# Patient Record
Sex: FEMALE | Race: OTHER | HISPANIC OR LATINO | Employment: FULL TIME | URBAN - METROPOLITAN AREA
[De-identification: names, ages, dates, MRNs, and addresses within clinical notes are randomized per-mention and may not be internally consistent; named-entity substitution may affect disease eponyms.]

---

## 2017-01-10 ENCOUNTER — ALLSCRIPTS OFFICE VISIT (OUTPATIENT)
Dept: OTHER | Facility: OTHER | Age: 42
End: 2017-01-10

## 2017-01-10 DIAGNOSIS — N91.5 OLIGOMENORRHEA: ICD-10-CM

## 2017-01-10 LAB — HCG, QUALITATIVE (HISTORICAL): NEGATIVE

## 2017-01-25 ENCOUNTER — ALLSCRIPTS OFFICE VISIT (OUTPATIENT)
Dept: OTHER | Facility: OTHER | Age: 42
End: 2017-01-25

## 2017-01-25 ENCOUNTER — APPOINTMENT (OUTPATIENT)
Dept: LAB | Facility: HOSPITAL | Age: 42
End: 2017-01-25
Attending: OBSTETRICS & GYNECOLOGY
Payer: COMMERCIAL

## 2017-01-25 DIAGNOSIS — N91.5 OLIGOMENORRHEA: ICD-10-CM

## 2017-01-25 LAB
B-HCG SERPL-ACNC: <2 MIU/ML
ERYTHROCYTE [DISTWIDTH] IN BLOOD BY AUTOMATED COUNT: 12.5 % (ref 11.6–15.1)
FSH SERPL-ACNC: 3.1 MIU/ML
HCT VFR BLD AUTO: 38.7 % (ref 34.8–46.1)
HGB BLD-MCNC: 13.2 G/DL (ref 11.5–15.4)
LH SERPL-ACNC: 3 MIU/ML
MCH RBC QN AUTO: 32.5 PG (ref 26.8–34.3)
MCHC RBC AUTO-ENTMCNC: 34.1 G/DL (ref 31.4–37.4)
MCV RBC AUTO: 95 FL (ref 82–98)
PLATELET # BLD AUTO: 257 THOUSANDS/UL (ref 149–390)
PMV BLD AUTO: 10.7 FL (ref 8.9–12.7)
PROLACTIN SERPL-MCNC: 21.6 NG/ML
RBC # BLD AUTO: 4.06 MILLION/UL (ref 3.81–5.12)
TSH SERPL DL<=0.05 MIU/L-ACNC: 2.42 UIU/ML (ref 0.36–3.74)
WBC # BLD AUTO: 6.75 THOUSAND/UL (ref 4.31–10.16)

## 2017-01-25 PROCEDURE — 84702 CHORIONIC GONADOTROPIN TEST: CPT

## 2017-01-25 PROCEDURE — 85027 COMPLETE CBC AUTOMATED: CPT

## 2017-01-25 PROCEDURE — 83002 ASSAY OF GONADOTROPIN (LH): CPT

## 2017-01-25 PROCEDURE — 84443 ASSAY THYROID STIM HORMONE: CPT

## 2017-01-25 PROCEDURE — 84146 ASSAY OF PROLACTIN: CPT

## 2017-01-25 PROCEDURE — 36415 COLL VENOUS BLD VENIPUNCTURE: CPT

## 2017-01-25 PROCEDURE — 83001 ASSAY OF GONADOTROPIN (FSH): CPT

## 2017-01-26 ENCOUNTER — GENERIC CONVERSION - ENCOUNTER (OUTPATIENT)
Dept: OTHER | Facility: OTHER | Age: 42
End: 2017-01-26

## 2017-02-09 ENCOUNTER — ALLSCRIPTS OFFICE VISIT (OUTPATIENT)
Dept: OTHER | Facility: OTHER | Age: 42
End: 2017-02-09

## 2017-02-15 ENCOUNTER — ALLSCRIPTS OFFICE VISIT (OUTPATIENT)
Dept: OTHER | Facility: OTHER | Age: 42
End: 2017-02-15

## 2017-02-15 LAB
BILIRUB UR QL STRIP: NEGATIVE
CLARITY UR: NORMAL
COLOR UR: CLEAR
GLUCOSE (HISTORICAL): NEGATIVE
HGB UR QL STRIP.AUTO: NEGATIVE
KETONES UR STRIP-MCNC: NEGATIVE MG/DL
LEUKOCYTE ESTERASE UR QL STRIP: NEGATIVE
NITRITE UR QL STRIP: NEGATIVE
PH UR STRIP.AUTO: 6.5 [PH]
PROT UR STRIP-MCNC: 15 MG/DL
SP GR UR STRIP.AUTO: 1.02
UROBILINOGEN UR QL STRIP.AUTO: 0.2

## 2017-02-23 ENCOUNTER — ALLSCRIPTS OFFICE VISIT (OUTPATIENT)
Dept: OTHER | Facility: OTHER | Age: 42
End: 2017-02-23

## 2017-02-28 ENCOUNTER — GENERIC CONVERSION - ENCOUNTER (OUTPATIENT)
Dept: OTHER | Facility: OTHER | Age: 42
End: 2017-02-28

## 2017-03-02 ENCOUNTER — GENERIC CONVERSION - ENCOUNTER (OUTPATIENT)
Dept: OTHER | Facility: OTHER | Age: 42
End: 2017-03-02

## 2017-03-13 ENCOUNTER — ALLSCRIPTS OFFICE VISIT (OUTPATIENT)
Dept: OTHER | Facility: OTHER | Age: 42
End: 2017-03-13

## 2017-04-04 ENCOUNTER — ALLSCRIPTS OFFICE VISIT (OUTPATIENT)
Dept: OTHER | Facility: OTHER | Age: 42
End: 2017-04-04

## 2017-04-04 DIAGNOSIS — Z12.31 ENCOUNTER FOR SCREENING MAMMOGRAM FOR MALIGNANT NEOPLASM OF BREAST: ICD-10-CM

## 2017-04-21 ENCOUNTER — HOSPITAL ENCOUNTER (OUTPATIENT)
Dept: MAMMOGRAPHY | Facility: HOSPITAL | Age: 42
Discharge: HOME | End: 2017-04-21
Attending: OBSTETRICS & GYNECOLOGY
Payer: COMMERCIAL

## 2017-04-21 DIAGNOSIS — Z12.31 ENCOUNTER FOR SCREENING MAMMOGRAM FOR MALIGNANT NEOPLASM OF BREAST: ICD-10-CM

## 2017-04-21 PROCEDURE — G0202 SCR MAMMO BI INCL CAD: HCPCS

## 2017-05-17 ENCOUNTER — APPOINTMENT (OUTPATIENT)
Dept: LAB | Facility: HOSPITAL | Age: 42
End: 2017-05-17
Attending: OBSTETRICS & GYNECOLOGY
Payer: COMMERCIAL

## 2017-05-17 ENCOUNTER — APPOINTMENT (OUTPATIENT)
Dept: PREADMISSION TESTING | Facility: HOSPITAL | Age: 42
End: 2017-05-17
Payer: COMMERCIAL

## 2017-05-17 ENCOUNTER — HOSPITAL ENCOUNTER (OUTPATIENT)
Dept: NON INVASIVE DIAGNOSTICS | Facility: HOSPITAL | Age: 42
Discharge: HOME | End: 2017-05-17
Attending: OBSTETRICS & GYNECOLOGY
Payer: COMMERCIAL

## 2017-05-17 ENCOUNTER — TRANSCRIBE ORDERS (OUTPATIENT)
Dept: ADMINISTRATIVE | Facility: HOSPITAL | Age: 42
End: 2017-05-17

## 2017-05-17 ENCOUNTER — ANESTHESIA EVENT (OUTPATIENT)
Dept: PERIOP | Facility: HOSPITAL | Age: 42
End: 2017-05-17
Payer: COMMERCIAL

## 2017-05-17 VITALS
BODY MASS INDEX: 29.95 KG/M2 | SYSTOLIC BLOOD PRESSURE: 106 MMHG | WEIGHT: 190.8 LBS | HEART RATE: 64 BPM | RESPIRATION RATE: 18 BRPM | HEIGHT: 67 IN | DIASTOLIC BLOOD PRESSURE: 68 MMHG | TEMPERATURE: 97.5 F

## 2017-05-17 DIAGNOSIS — Z01.818 PREOP EXAMINATION: ICD-10-CM

## 2017-05-17 DIAGNOSIS — D25.9 LEIOMYOMA OF UTERUS, UNSPECIFIED: ICD-10-CM

## 2017-05-17 DIAGNOSIS — Z01.818 PREOP EXAMINATION: Primary | ICD-10-CM

## 2017-05-17 LAB
ATRIAL RATE: 64 BPM
B-HCG SERPL-ACNC: <2 MIU/ML
BASOPHILS # BLD AUTO: 0.03 THOUSANDS/ΜL (ref 0–0.1)
BASOPHILS NFR BLD AUTO: 0 % (ref 0–1)
EOSINOPHIL # BLD AUTO: 0.07 THOUSAND/ΜL (ref 0–0.61)
EOSINOPHIL NFR BLD AUTO: 1 % (ref 0–6)
ERYTHROCYTE [DISTWIDTH] IN BLOOD BY AUTOMATED COUNT: 12.6 % (ref 11.6–15.1)
HCT VFR BLD AUTO: 35.6 % (ref 34.8–46.1)
HGB BLD-MCNC: 12.5 G/DL (ref 11.5–15.4)
LYMPHOCYTES # BLD AUTO: 2.19 THOUSANDS/ΜL (ref 0.6–4.47)
LYMPHOCYTES NFR BLD AUTO: 29 % (ref 14–44)
MCH RBC QN AUTO: 33.2 PG (ref 26.8–34.3)
MCHC RBC AUTO-ENTMCNC: 35.1 G/DL (ref 31.4–37.4)
MCV RBC AUTO: 95 FL (ref 82–98)
MONOCYTES # BLD AUTO: 0.43 THOUSAND/ΜL (ref 0.17–1.22)
MONOCYTES NFR BLD AUTO: 6 % (ref 4–12)
NEUTROPHILS # BLD AUTO: 4.87 THOUSANDS/ΜL (ref 1.85–7.62)
NEUTS SEG NFR BLD AUTO: 64 % (ref 43–75)
NRBC BLD AUTO-RTO: 0 /100 WBCS
P AXIS: 51 DEGREES
PLATELET # BLD AUTO: 268 THOUSANDS/UL (ref 149–390)
PMV BLD AUTO: 10.7 FL (ref 8.9–12.7)
PR INTERVAL: 144 MS
QRS AXIS: 61 DEGREES
QRSD INTERVAL: 84 MS
QT INTERVAL: 412 MS
QTC INTERVAL: 425 MS
RBC # BLD AUTO: 3.76 MILLION/UL (ref 3.81–5.12)
T WAVE AXIS: 39 DEGREES
VENTRICULAR RATE: 64 BPM
WBC # BLD AUTO: 7.59 THOUSAND/UL (ref 4.31–10.16)

## 2017-05-17 PROCEDURE — 85025 COMPLETE CBC W/AUTO DIFF WBC: CPT

## 2017-05-17 PROCEDURE — 84702 CHORIONIC GONADOTROPIN TEST: CPT

## 2017-05-17 PROCEDURE — 36415 COLL VENOUS BLD VENIPUNCTURE: CPT

## 2017-05-17 PROCEDURE — 93005 ELECTROCARDIOGRAM TRACING: CPT

## 2017-05-17 RX ORDER — SODIUM CHLORIDE 9 MG/ML
125 INJECTION, SOLUTION INTRAVENOUS CONTINUOUS
Status: CANCELLED | OUTPATIENT
Start: 2017-05-17

## 2017-05-19 ENCOUNTER — HOSPITAL ENCOUNTER (OUTPATIENT)
Facility: HOSPITAL | Age: 42
Setting detail: OUTPATIENT SURGERY
Discharge: HOME | End: 2017-05-20
Attending: OBSTETRICS & GYNECOLOGY | Admitting: OBSTETRICS & GYNECOLOGY
Payer: COMMERCIAL

## 2017-05-19 ENCOUNTER — ANESTHESIA (OUTPATIENT)
Dept: PERIOP | Facility: HOSPITAL | Age: 42
End: 2017-05-19
Payer: COMMERCIAL

## 2017-05-19 DIAGNOSIS — N92.1 MENOMETRORRHAGIA: ICD-10-CM

## 2017-05-19 DIAGNOSIS — D25.9 LEIOMYOMA OF UTERUS: ICD-10-CM

## 2017-05-19 DIAGNOSIS — N94.6 DYSMENORRHEA: ICD-10-CM

## 2017-05-19 LAB
ABO GROUP BLD: NORMAL
BLD GP AB SCN SERPL QL: NEGATIVE
GLUCOSE SERPL-MCNC: 117 MG/DL (ref 65–140)
RH BLD: POSITIVE
SPECIMEN EXPIRATION DATE: NORMAL

## 2017-05-19 PROCEDURE — 86900 BLOOD TYPING SEROLOGIC ABO: CPT | Performed by: OBSTETRICS & GYNECOLOGY

## 2017-05-19 PROCEDURE — 86850 RBC ANTIBODY SCREEN: CPT | Performed by: OBSTETRICS & GYNECOLOGY

## 2017-05-19 PROCEDURE — 82948 REAGENT STRIP/BLOOD GLUCOSE: CPT

## 2017-05-19 PROCEDURE — 86901 BLOOD TYPING SEROLOGIC RH(D): CPT | Performed by: OBSTETRICS & GYNECOLOGY

## 2017-05-19 PROCEDURE — 88307 TISSUE EXAM BY PATHOLOGIST: CPT | Performed by: OBSTETRICS & GYNECOLOGY

## 2017-05-19 RX ORDER — GLYCOPYRROLATE 0.2 MG/ML
INJECTION INTRAMUSCULAR; INTRAVENOUS AS NEEDED
Status: DISCONTINUED | OUTPATIENT
Start: 2017-05-19 | End: 2017-05-19 | Stop reason: SURG

## 2017-05-19 RX ORDER — ONDANSETRON 2 MG/ML
4 INJECTION INTRAMUSCULAR; INTRAVENOUS ONCE
Status: COMPLETED | OUTPATIENT
Start: 2017-05-19 | End: 2017-05-19

## 2017-05-19 RX ORDER — OXYCODONE HYDROCHLORIDE AND ACETAMINOPHEN 5; 325 MG/1; MG/1
1 TABLET ORAL EVERY 4 HOURS PRN
Status: DISCONTINUED | OUTPATIENT
Start: 2017-05-19 | End: 2017-05-20 | Stop reason: HOSPADM

## 2017-05-19 RX ORDER — SODIUM CHLORIDE 9 MG/ML
125 INJECTION, SOLUTION INTRAVENOUS CONTINUOUS
Status: DISCONTINUED | OUTPATIENT
Start: 2017-05-19 | End: 2017-05-20 | Stop reason: HOSPADM

## 2017-05-19 RX ORDER — SODIUM CHLORIDE, SODIUM LACTATE, POTASSIUM CHLORIDE, CALCIUM CHLORIDE 600; 310; 30; 20 MG/100ML; MG/100ML; MG/100ML; MG/100ML
125 INJECTION, SOLUTION INTRAVENOUS CONTINUOUS
Status: DISCONTINUED | OUTPATIENT
Start: 2017-05-19 | End: 2017-05-20 | Stop reason: HOSPADM

## 2017-05-19 RX ORDER — OXYCODONE HYDROCHLORIDE AND ACETAMINOPHEN 5; 325 MG/1; MG/1
2 TABLET ORAL EVERY 4 HOURS PRN
Status: DISCONTINUED | OUTPATIENT
Start: 2017-05-19 | End: 2017-05-20 | Stop reason: HOSPADM

## 2017-05-19 RX ORDER — KETOROLAC TROMETHAMINE 30 MG/ML
INJECTION, SOLUTION INTRAMUSCULAR; INTRAVENOUS AS NEEDED
Status: DISCONTINUED | OUTPATIENT
Start: 2017-05-19 | End: 2017-05-19 | Stop reason: SURG

## 2017-05-19 RX ORDER — OXYCODONE HYDROCHLORIDE 10 MG/1
TABLET ORAL
Status: COMPLETED
Start: 2017-05-19 | End: 2017-05-19

## 2017-05-19 RX ORDER — FENTANYL CITRATE 50 UG/ML
INJECTION, SOLUTION INTRAMUSCULAR; INTRAVENOUS AS NEEDED
Status: DISCONTINUED | OUTPATIENT
Start: 2017-05-19 | End: 2017-05-19 | Stop reason: SURG

## 2017-05-19 RX ORDER — ROCURONIUM BROMIDE 10 MG/ML
INJECTION, SOLUTION INTRAVENOUS AS NEEDED
Status: DISCONTINUED | OUTPATIENT
Start: 2017-05-19 | End: 2017-05-19 | Stop reason: SURG

## 2017-05-19 RX ORDER — PROPOFOL 10 MG/ML
INJECTION, EMULSION INTRAVENOUS AS NEEDED
Status: DISCONTINUED | OUTPATIENT
Start: 2017-05-19 | End: 2017-05-19 | Stop reason: SURG

## 2017-05-19 RX ORDER — BUPIVACAINE HYDROCHLORIDE 5 MG/ML
INJECTION, SOLUTION PERINEURAL AS NEEDED
Status: DISCONTINUED | OUTPATIENT
Start: 2017-05-19 | End: 2017-05-19 | Stop reason: HOSPADM

## 2017-05-19 RX ORDER — ONDANSETRON 2 MG/ML
INJECTION INTRAMUSCULAR; INTRAVENOUS AS NEEDED
Status: DISCONTINUED | OUTPATIENT
Start: 2017-05-19 | End: 2017-05-19 | Stop reason: SURG

## 2017-05-19 RX ORDER — NORETHINDRONE ACETATE AND ETHINYL ESTRADIOL 1MG-20(21)
1 KIT ORAL DAILY
COMMUNITY
End: 2017-05-20 | Stop reason: HOSPADM

## 2017-05-19 RX ORDER — MIDAZOLAM HYDROCHLORIDE 1 MG/ML
INJECTION INTRAMUSCULAR; INTRAVENOUS AS NEEDED
Status: DISCONTINUED | OUTPATIENT
Start: 2017-05-19 | End: 2017-05-19 | Stop reason: SURG

## 2017-05-19 RX ORDER — LIDOCAINE HYDROCHLORIDE 10 MG/ML
INJECTION, SOLUTION INFILTRATION; PERINEURAL AS NEEDED
Status: DISCONTINUED | OUTPATIENT
Start: 2017-05-19 | End: 2017-05-19 | Stop reason: SURG

## 2017-05-19 RX ORDER — ONDANSETRON 2 MG/ML
INJECTION INTRAMUSCULAR; INTRAVENOUS
Status: COMPLETED
Start: 2017-05-19 | End: 2017-05-19

## 2017-05-19 RX ORDER — FENTANYL CITRATE/PF 50 MCG/ML
25 SYRINGE (ML) INJECTION
Status: DISCONTINUED | OUTPATIENT
Start: 2017-05-19 | End: 2017-05-19 | Stop reason: HOSPADM

## 2017-05-19 RX ORDER — IBUPROFEN 600 MG/1
600 TABLET ORAL EVERY 6 HOURS PRN
Status: DISCONTINUED | OUTPATIENT
Start: 2017-05-19 | End: 2017-05-20 | Stop reason: HOSPADM

## 2017-05-19 RX ADMIN — NEOSTIGMINE METHYLSULFATE 3 MG: 1 INJECTION, SOLUTION INTRAMUSCULAR; INTRAVENOUS; SUBCUTANEOUS at 09:44

## 2017-05-19 RX ADMIN — METRONIDAZOLE 500 MG: 500 INJECTION, SOLUTION INTRAVENOUS at 07:41

## 2017-05-19 RX ADMIN — ROCURONIUM BROMIDE 10 MG: 10 INJECTION, SOLUTION INTRAVENOUS at 08:44

## 2017-05-19 RX ADMIN — FENTANYL CITRATE 25 MCG: 50 INJECTION, SOLUTION INTRAMUSCULAR; INTRAVENOUS at 10:38

## 2017-05-19 RX ADMIN — FENTANYL CITRATE 50 MCG: 50 INJECTION, SOLUTION INTRAMUSCULAR; INTRAVENOUS at 09:44

## 2017-05-19 RX ADMIN — LIDOCAINE HYDROCHLORIDE 40 MG: 10 INJECTION, SOLUTION INFILTRATION; PERINEURAL at 07:32

## 2017-05-19 RX ADMIN — FENTANYL CITRATE 50 MCG: 50 INJECTION, SOLUTION INTRAMUSCULAR; INTRAVENOUS at 09:50

## 2017-05-19 RX ADMIN — PROPOFOL 200 MG: 10 INJECTION, EMULSION INTRAVENOUS at 07:32

## 2017-05-19 RX ADMIN — FENTANYL CITRATE 25 MCG: 50 INJECTION, SOLUTION INTRAMUSCULAR; INTRAVENOUS at 10:32

## 2017-05-19 RX ADMIN — MIDAZOLAM HYDROCHLORIDE 2 MG: 1 INJECTION, SOLUTION INTRAMUSCULAR; INTRAVENOUS at 07:24

## 2017-05-19 RX ADMIN — ONDANSETRON HYDROCHLORIDE 4 MG: 2 INJECTION, SOLUTION INTRAVENOUS at 07:49

## 2017-05-19 RX ADMIN — ONDANSETRON 4 MG: 2 INJECTION INTRAMUSCULAR; INTRAVENOUS at 10:28

## 2017-05-19 RX ADMIN — ROCURONIUM BROMIDE 50 MG: 10 INJECTION, SOLUTION INTRAVENOUS at 07:32

## 2017-05-19 RX ADMIN — FENTANYL CITRATE 50 MCG: 50 INJECTION, SOLUTION INTRAMUSCULAR; INTRAVENOUS at 08:17

## 2017-05-19 RX ADMIN — HYDROMORPHONE HYDROCHLORIDE 0.5 MG: 1 INJECTION, SOLUTION INTRAMUSCULAR; INTRAVENOUS; SUBCUTANEOUS at 11:06

## 2017-05-19 RX ADMIN — FENTANYL CITRATE 50 MCG: 50 INJECTION, SOLUTION INTRAMUSCULAR; INTRAVENOUS at 07:40

## 2017-05-19 RX ADMIN — FENTANYL CITRATE 50 MCG: 50 INJECTION, SOLUTION INTRAMUSCULAR; INTRAVENOUS at 08:56

## 2017-05-19 RX ADMIN — DEXAMETHASONE SODIUM PHOSPHATE 10 MG: 10 INJECTION INTRAMUSCULAR; INTRAVENOUS at 07:49

## 2017-05-19 RX ADMIN — FENTANYL CITRATE 25 MCG: 50 INJECTION, SOLUTION INTRAMUSCULAR; INTRAVENOUS at 10:27

## 2017-05-19 RX ADMIN — SODIUM CHLORIDE 125 ML/HR: 0.9 INJECTION, SOLUTION INTRAVENOUS at 14:10

## 2017-05-19 RX ADMIN — OXYCODONE HYDROCHLORIDE AND ACETAMINOPHEN 2 TABLET: 5; 325 TABLET ORAL at 20:53

## 2017-05-19 RX ADMIN — FENTANYL CITRATE 50 MCG: 50 INJECTION, SOLUTION INTRAMUSCULAR; INTRAVENOUS at 09:56

## 2017-05-19 RX ADMIN — CEFAZOLIN SODIUM 2000 MG: 2 SOLUTION INTRAVENOUS at 07:41

## 2017-05-19 RX ADMIN — SODIUM CHLORIDE 125 ML/HR: 0.9 INJECTION, SOLUTION INTRAVENOUS at 15:25

## 2017-05-19 RX ADMIN — KETOROLAC TROMETHAMINE 30 MG: 30 INJECTION, SOLUTION INTRAMUSCULAR at 09:38

## 2017-05-19 RX ADMIN — FENTANYL CITRATE 25 MCG: 50 INJECTION, SOLUTION INTRAMUSCULAR; INTRAVENOUS at 10:21

## 2017-05-19 RX ADMIN — HYDROMORPHONE HYDROCHLORIDE 0.5 MG: 1 INJECTION, SOLUTION INTRAMUSCULAR; INTRAVENOUS; SUBCUTANEOUS at 10:56

## 2017-05-19 RX ADMIN — GLYCOPYRROLATE 0.4 MG: 0.2 INJECTION, SOLUTION INTRAMUSCULAR; INTRAVENOUS at 09:44

## 2017-05-19 RX ADMIN — ONDANSETRON 4 MG: 2 INJECTION INTRAMUSCULAR; INTRAVENOUS at 14:50

## 2017-05-19 RX ADMIN — OXYCODONE HYDROCHLORIDE 10 MG: 10 TABLET ORAL at 14:51

## 2017-05-19 RX ADMIN — SODIUM CHLORIDE 125 ML/HR: 0.9 INJECTION, SOLUTION INTRAVENOUS at 06:59

## 2017-05-20 VITALS
OXYGEN SATURATION: 96 % | SYSTOLIC BLOOD PRESSURE: 115 MMHG | HEIGHT: 67 IN | BODY MASS INDEX: 29.82 KG/M2 | TEMPERATURE: 98.2 F | HEART RATE: 74 BPM | RESPIRATION RATE: 18 BRPM | WEIGHT: 190 LBS | DIASTOLIC BLOOD PRESSURE: 69 MMHG

## 2017-05-20 PROBLEM — N92.6 IRREGULAR MENSES: Status: ACTIVE | Noted: 2017-05-20

## 2017-05-20 LAB
ERYTHROCYTE [DISTWIDTH] IN BLOOD BY AUTOMATED COUNT: 12.8 % (ref 11.6–15.1)
HCT VFR BLD AUTO: 33 % (ref 34.8–46.1)
HGB BLD-MCNC: 11.1 G/DL (ref 11.5–15.4)
MCH RBC QN AUTO: 32.3 PG (ref 26.8–34.3)
MCHC RBC AUTO-ENTMCNC: 33.6 G/DL (ref 31.4–37.4)
MCV RBC AUTO: 96 FL (ref 82–98)
PLATELET # BLD AUTO: 237 THOUSANDS/UL (ref 149–390)
PMV BLD AUTO: 10.7 FL (ref 8.9–12.7)
RBC # BLD AUTO: 3.44 MILLION/UL (ref 3.81–5.12)
WBC # BLD AUTO: 10.38 THOUSAND/UL (ref 4.31–10.16)

## 2017-05-20 PROCEDURE — 85027 COMPLETE CBC AUTOMATED: CPT | Performed by: OBSTETRICS & GYNECOLOGY

## 2017-05-20 RX ORDER — IBUPROFEN 600 MG/1
600 TABLET ORAL EVERY 6 HOURS PRN
Qty: 30 TABLET | Refills: 0
Start: 2017-05-20 | End: 2018-03-06 | Stop reason: ALTCHOICE

## 2017-05-20 RX ORDER — BISACODYL 10 MG
10 SUPPOSITORY, RECTAL RECTAL ONCE
Status: COMPLETED | OUTPATIENT
Start: 2017-05-20 | End: 2017-05-20

## 2017-05-20 RX ORDER — DOCUSATE SODIUM 100 MG/1
100 CAPSULE, LIQUID FILLED ORAL 2 TIMES DAILY
Status: DISCONTINUED | OUTPATIENT
Start: 2017-05-20 | End: 2017-05-20 | Stop reason: HOSPADM

## 2017-05-20 RX ORDER — OXYCODONE HYDROCHLORIDE AND ACETAMINOPHEN 5; 325 MG/1; MG/1
1 TABLET ORAL EVERY 4 HOURS PRN
Refills: 0
Start: 2017-05-20 | End: 2017-05-30

## 2017-05-20 RX ADMIN — BISACODYL 10 MG: 10 SUPPOSITORY RECTAL at 08:20

## 2017-05-20 RX ADMIN — OXYCODONE HYDROCHLORIDE AND ACETAMINOPHEN 2 TABLET: 5; 325 TABLET ORAL at 03:28

## 2017-05-20 RX ADMIN — IBUPROFEN 600 MG: 600 TABLET, FILM COATED ORAL at 06:06

## 2017-05-20 RX ADMIN — DOCUSATE SODIUM 100 MG: 100 CAPSULE, LIQUID FILLED ORAL at 08:20

## 2017-06-05 ENCOUNTER — ALLSCRIPTS OFFICE VISIT (OUTPATIENT)
Dept: OTHER | Facility: OTHER | Age: 42
End: 2017-06-05

## 2017-07-26 ENCOUNTER — ALLSCRIPTS OFFICE VISIT (OUTPATIENT)
Dept: OTHER | Facility: OTHER | Age: 42
End: 2017-07-26

## 2018-01-12 VITALS
SYSTOLIC BLOOD PRESSURE: 104 MMHG | DIASTOLIC BLOOD PRESSURE: 84 MMHG | HEIGHT: 67 IN | WEIGHT: 193.13 LBS | BODY MASS INDEX: 30.31 KG/M2

## 2018-01-12 VITALS
HEIGHT: 67 IN | WEIGHT: 192 LBS | BODY MASS INDEX: 30.13 KG/M2 | SYSTOLIC BLOOD PRESSURE: 104 MMHG | DIASTOLIC BLOOD PRESSURE: 76 MMHG

## 2018-01-12 VITALS
WEIGHT: 189 LBS | HEIGHT: 67 IN | BODY MASS INDEX: 29.66 KG/M2 | SYSTOLIC BLOOD PRESSURE: 108 MMHG | DIASTOLIC BLOOD PRESSURE: 74 MMHG

## 2018-01-12 NOTE — MISCELLANEOUS
Message  Return to work or school:   Jeremy Man is under my professional care  She was seen in my office on 02/23/17   She is able to return to work on  02/27/17      PLEASE EXCUSE [address] ON 02/24/17  Dr C W Riedel/saúl        Signatures   Electronically signed by : Rhianna Slater, ; Feb 28 2017  8:19AM EST                       (Author)    Electronically signed by : Rhianna Slater, ; Feb 28 2017  8:21AM EST                       (Author)

## 2018-01-13 VITALS
BODY MASS INDEX: 29.86 KG/M2 | HEIGHT: 67 IN | WEIGHT: 190.25 LBS | DIASTOLIC BLOOD PRESSURE: 82 MMHG | SYSTOLIC BLOOD PRESSURE: 114 MMHG

## 2018-01-13 VITALS
WEIGHT: 174 LBS | DIASTOLIC BLOOD PRESSURE: 75 MMHG | HEIGHT: 67 IN | BODY MASS INDEX: 27.31 KG/M2 | SYSTOLIC BLOOD PRESSURE: 100 MMHG

## 2018-01-14 VITALS
HEIGHT: 67 IN | DIASTOLIC BLOOD PRESSURE: 70 MMHG | SYSTOLIC BLOOD PRESSURE: 100 MMHG | BODY MASS INDEX: 30.29 KG/M2 | WEIGHT: 193 LBS

## 2018-01-14 VITALS
WEIGHT: 192 LBS | HEIGHT: 67 IN | DIASTOLIC BLOOD PRESSURE: 75 MMHG | BODY MASS INDEX: 30.13 KG/M2 | SYSTOLIC BLOOD PRESSURE: 100 MMHG

## 2018-01-15 VITALS
SYSTOLIC BLOOD PRESSURE: 112 MMHG | DIASTOLIC BLOOD PRESSURE: 82 MMHG | HEIGHT: 67 IN | BODY MASS INDEX: 30.16 KG/M2 | WEIGHT: 192.13 LBS

## 2018-01-16 NOTE — MISCELLANEOUS
Provider Comments  Provider Comments:   PATIENT NO SHOWED FOR APPOINTMENT TODAY, I CALLED AND LEFT MESSAGE ON PATIENTS VOICE MAIL TO CALL THE OFFICE      Signatures   Electronically signed by : Zacarias Habermann, ; Jan 26 2017  4:49PM EST                       (Author)    Electronically signed by : Zacarias Habermann, ; Jan 26 2017  4:50PM EST                       (Author)

## 2018-03-06 ENCOUNTER — OFFICE VISIT (OUTPATIENT)
Dept: OBGYN CLINIC | Facility: CLINIC | Age: 43
End: 2018-03-06
Payer: COMMERCIAL

## 2018-03-06 VITALS
SYSTOLIC BLOOD PRESSURE: 118 MMHG | DIASTOLIC BLOOD PRESSURE: 86 MMHG | WEIGHT: 204.8 LBS | HEIGHT: 66 IN | BODY MASS INDEX: 32.92 KG/M2

## 2018-03-06 DIAGNOSIS — R10.2 PELVIC PAIN IN FEMALE: Primary | ICD-10-CM

## 2018-03-06 DIAGNOSIS — R63.5 WEIGHT GAIN: ICD-10-CM

## 2018-03-06 DIAGNOSIS — M54.5 BILATERAL LOW BACK PAIN, UNSPECIFIED CHRONICITY, WITH SCIATICA PRESENCE UNSPECIFIED: ICD-10-CM

## 2018-03-06 DIAGNOSIS — Z90.710 STATUS POST LAPAROSCOPIC HYSTERECTOMY: ICD-10-CM

## 2018-03-06 DIAGNOSIS — N95.1 MENOPAUSAL SYNDROME: ICD-10-CM

## 2018-03-06 DIAGNOSIS — Z90.79 STATUS POST BILATERAL SALPINGECTOMY: ICD-10-CM

## 2018-03-06 LAB
SL AMB  POCT GLUCOSE, UA: ABNORMAL
SL AMB LEUKOCYTE ESTERASE,UA: ABNORMAL
SL AMB POCT BILIRUBIN,UA: ABNORMAL
SL AMB POCT BLOOD,UA: ABNORMAL
SL AMB POCT CLARITY,UA: ABNORMAL
SL AMB POCT COLOR,UA: YELLOW
SL AMB POCT KETONES,UA: ABNORMAL
SL AMB POCT NITRITE,UA: ABNORMAL
SL AMB POCT PH,UA: 5
SL AMB POCT SPECIFIC GRAVITY,UA: ABNORMAL
SL AMB POCT URINE PROTEIN: ABNORMAL
SL AMB POCT UROBILINOGEN: 0.2

## 2018-03-06 PROCEDURE — 99214 OFFICE O/P EST MOD 30 MIN: CPT | Performed by: OBSTETRICS & GYNECOLOGY

## 2018-03-06 PROCEDURE — 87086 URINE CULTURE/COLONY COUNT: CPT | Performed by: OBSTETRICS & GYNECOLOGY

## 2018-03-06 PROCEDURE — 81002 URINALYSIS NONAUTO W/O SCOPE: CPT | Performed by: OBSTETRICS & GYNECOLOGY

## 2018-03-06 PROCEDURE — 81003 URINALYSIS AUTO W/O SCOPE: CPT | Performed by: OBSTETRICS & GYNECOLOGY

## 2018-03-06 RX ORDER — IBUPROFEN 200 MG
TABLET ORAL EVERY 6 HOURS PRN
COMMUNITY
End: 2018-06-05 | Stop reason: ALTCHOICE

## 2018-03-06 RX ORDER — TRAMADOL HYDROCHLORIDE 50 MG/1
50-100 TABLET ORAL EVERY 6 HOURS PRN
Qty: 40 TABLET | Refills: 0 | Status: SHIPPED | OUTPATIENT
Start: 2018-03-06 | End: 2019-07-29 | Stop reason: ALTCHOICE

## 2018-03-07 NOTE — PROGRESS NOTES
Assessment/Plan:  Recurrent pelvic pain   Status post total laparoscopic hysterectomy with bilateral salpingectomy  Menopausal syndrome   No problem-specific Assessment & Plan notes found for this encounter  Subjective:  Recurrent pelvic pain     Patient ID: Vivian Burrows is a 40 y o  female  HPI  71-year-old female  3 para 3 history of abnormal uterine bleeding and uterine fibroids  Patient had a total laparoscopic hysterectomy last year in May which was uncomplicated and she initially felt better  She was last seen here in this office in July of last year  Patient's concern of recurrent pelvic pain symptoms and suprapubic pain  No bleeding  Complains of hot flashes and vaginal dryness  Patient relates her pain about 8/10  Patient also concern with weight gain her weight last summer was 192 today she weighs 204 lb  On exam number no pelvic masses noted the adnexal did not seem large slightly tender bilaterally  Denies discharge or itching  Screening laboratory studies including fasting blood sugar was ordered pelvic ultrasound also ordered  Patient given prescription for tramadol 50 mg 1-2 q 4-6 hours p r n  for pelvic pain symptoms  Review of Systems   Genitourinary: Positive for pelvic pain  Negative for menstrual problem, vaginal bleeding, vaginal discharge and vaginal pain  All other systems reviewed and are negative  Objective:  /86 (BP Location: Right arm, Patient Position: Sitting, Cuff Size: Standard)   Ht 5' 6" (1 676 m)   Wt 92 9 kg (204 lb 12 8 oz)   LMP  (LMP Unknown)   BMI 33 06 kg/m²      Physical Exam   Constitutional: She appears well-developed and well-nourished  HENT:   Head: Normocephalic and atraumatic  Eyes: Pupils are equal, round, and reactive to light  Neck: Normal range of motion  Cardiovascular: Normal rate and regular rhythm  Pulmonary/Chest: Effort normal and breath sounds normal    Abdominal: Soft   Bowel sounds are normal  Genitourinary: Vagina normal and uterus normal    Genitourinary Comments: No pelvic masses noted uterus and cervix surgically absent    Vaginal exam otherwise normal

## 2018-03-08 LAB
BILIRUB UR QL STRIP: NEGATIVE
CLARITY UR: NORMAL
COLOR UR: YELLOW
GLUCOSE UR STRIP-MCNC: NEGATIVE MG/DL
HGB UR QL STRIP.AUTO: NEGATIVE
KETONES UR STRIP-MCNC: NEGATIVE MG/DL
LEUKOCYTE ESTERASE UR QL STRIP: NEGATIVE
NITRITE UR QL STRIP: NEGATIVE
PH UR STRIP.AUTO: 5.5 [PH] (ref 4.5–8)
PROT UR STRIP-MCNC: NEGATIVE MG/DL
SP GR UR STRIP.AUTO: 1.02 (ref 1–1.03)
UROBILINOGEN UR QL STRIP.AUTO: 0.2 E.U./DL

## 2018-03-09 LAB — BACTERIA UR CULT: NORMAL

## 2018-03-24 ENCOUNTER — APPOINTMENT (OUTPATIENT)
Dept: LAB | Facility: HOSPITAL | Age: 43
End: 2018-03-24
Attending: OBSTETRICS & GYNECOLOGY
Payer: COMMERCIAL

## 2018-03-24 DIAGNOSIS — R63.5 WEIGHT GAIN: ICD-10-CM

## 2018-03-24 DIAGNOSIS — N95.1 MENOPAUSAL SYNDROME: ICD-10-CM

## 2018-03-24 LAB
FSH SERPL-ACNC: 3 MIU/ML
GLUCOSE P FAST SERPL-MCNC: 108 MG/DL (ref 65–99)
LH SERPL-ACNC: 8.4 MIU/ML
T4 FREE SERPL-MCNC: 0.79 NG/DL (ref 0.76–1.46)
TSH SERPL DL<=0.05 MIU/L-ACNC: 4.01 UIU/ML (ref 0.36–3.74)

## 2018-03-24 PROCEDURE — 84439 ASSAY OF FREE THYROXINE: CPT

## 2018-03-24 PROCEDURE — 83002 ASSAY OF GONADOTROPIN (LH): CPT

## 2018-03-24 PROCEDURE — 36415 COLL VENOUS BLD VENIPUNCTURE: CPT

## 2018-03-24 PROCEDURE — 82947 ASSAY GLUCOSE BLOOD QUANT: CPT

## 2018-03-24 PROCEDURE — 84443 ASSAY THYROID STIM HORMONE: CPT

## 2018-03-24 PROCEDURE — 83001 ASSAY OF GONADOTROPIN (FSH): CPT

## 2018-03-26 ENCOUNTER — HOSPITAL ENCOUNTER (OUTPATIENT)
Dept: ULTRASOUND IMAGING | Facility: HOSPITAL | Age: 43
Discharge: HOME | End: 2018-03-26
Attending: OBSTETRICS & GYNECOLOGY
Payer: COMMERCIAL

## 2018-03-26 DIAGNOSIS — R10.2 PELVIC PAIN IN FEMALE: ICD-10-CM

## 2018-03-26 PROCEDURE — 76830 TRANSVAGINAL US NON-OB: CPT

## 2018-03-26 PROCEDURE — 76856 US EXAM PELVIC COMPLETE: CPT

## 2018-03-28 ENCOUNTER — OFFICE VISIT (OUTPATIENT)
Dept: OBGYN CLINIC | Facility: CLINIC | Age: 43
End: 2018-03-28
Payer: COMMERCIAL

## 2018-03-28 VITALS
WEIGHT: 209.6 LBS | HEIGHT: 67 IN | SYSTOLIC BLOOD PRESSURE: 114 MMHG | DIASTOLIC BLOOD PRESSURE: 72 MMHG | BODY MASS INDEX: 32.9 KG/M2

## 2018-03-28 DIAGNOSIS — Z90.79 HISTORY OF BILATERAL SALPINGECTOMY: ICD-10-CM

## 2018-03-28 DIAGNOSIS — Z90.710 S/P LAPAROSCOPIC HYSTERECTOMY: ICD-10-CM

## 2018-03-28 DIAGNOSIS — R73.01 ELEVATED FASTING BLOOD SUGAR: Primary | ICD-10-CM

## 2018-03-28 DIAGNOSIS — R10.2 PELVIC PAIN IN FEMALE: ICD-10-CM

## 2018-03-28 DIAGNOSIS — E03.9 ACQUIRED HYPOTHYROIDISM: ICD-10-CM

## 2018-03-28 PROCEDURE — 99214 OFFICE O/P EST MOD 30 MIN: CPT | Performed by: OBSTETRICS & GYNECOLOGY

## 2018-03-28 RX ORDER — LEVOTHYROXINE SODIUM 0.05 MG/1
50 TABLET ORAL DAILY
Qty: 30 TABLET | Refills: 2 | Status: SHIPPED | OUTPATIENT
Start: 2018-03-28 | End: 2018-06-21 | Stop reason: SDUPTHER

## 2018-03-28 NOTE — PROGRESS NOTES
Assessment/Plan:   Excessive weight gain  Elevated fasting blood sugar  Pelvic pain  Acquired hypothyroidism    Plan  Initiation of metformin  Initiation of levothyroxine  Follow-up on pelvic ultrasound pending completion of study     Diagnoses and all orders for this visit:    Elevated fasting blood sugar  -     metFORMIN (GLUCOPHAGE) 500 mg tablet; Take 1 tablet (500 mg total) by mouth 2 (two) times a day with meals for 30 days    Acquired hypothyroidism  -     levothyroxine 50 mcg tablet; Take 1 tablet (50 mcg total) by mouth daily for 30 days    Pelvic pain in female    S/P laparoscopic hysterectomy    History of bilateral salpingectomy        Subjective:  Feels somewhat better less pelvic pain now only 4/10     Patient ID: Thao Cheek is a 40 y o  female  HPI   52-year-old female recently here 2 weeks ago for recurrent onset of pelvic pain symptoms  She had a total laparoscopic hysterectomy with bilateral salpingectomy last year  She had been initially doing well  Had sudden onset of pain 2 weeks ago  Pelvic ultrasound was ordered however results are not yet available she only completed 2 days ago  Awaiting final report  Patient also had concerns of weight gain in spite of dieting and exercising  If fasting blood sugar was found to be elevated at 108  TSH level was also elevated suggestive of acquired hypothyroidism  Patient is currently in between PCP providers and does not have an assigned provider  Recommend that I initiate metformin therapy 500 mg twice daily breakfast and supper  Also will initiate levothyroxine 50 mcg daily  Instructions given to patient how to start and potential side effects  She will return within the next 6 weeks to assess treatment and ongoing plan of care  Await ultrasound results will call patient when they become available      Review of Systems    Unchanged since previous visit  Objective:  Vital signs documented on this visit     Physical Exam    Physical exam completed at her last visit

## 2018-04-13 ENCOUNTER — OFFICE VISIT (OUTPATIENT)
Dept: FAMILY MEDICINE CLINIC | Facility: CLINIC | Age: 43
End: 2018-04-13
Payer: COMMERCIAL

## 2018-04-13 VITALS
BODY MASS INDEX: 32.71 KG/M2 | WEIGHT: 208.4 LBS | SYSTOLIC BLOOD PRESSURE: 120 MMHG | HEIGHT: 67 IN | DIASTOLIC BLOOD PRESSURE: 82 MMHG

## 2018-04-13 DIAGNOSIS — R73.9 HYPERGLYCEMIA: ICD-10-CM

## 2018-04-13 DIAGNOSIS — R10.30 LOWER ABDOMINAL PAIN: ICD-10-CM

## 2018-04-13 DIAGNOSIS — K59.04 CHRONIC IDIOPATHIC CONSTIPATION: ICD-10-CM

## 2018-04-13 DIAGNOSIS — R40.0 DAYTIME SOMNOLENCE: ICD-10-CM

## 2018-04-13 DIAGNOSIS — E03.9 ACQUIRED HYPOTHYROIDISM: Primary | ICD-10-CM

## 2018-04-13 PROBLEM — N92.6 IRREGULAR MENSES: Status: RESOLVED | Noted: 2017-05-20 | Resolved: 2018-04-13

## 2018-04-13 LAB — SL AMB POCT HEMOGLOBIN AIC: 5.3

## 2018-04-13 PROCEDURE — 99204 OFFICE O/P NEW MOD 45 MIN: CPT | Performed by: FAMILY MEDICINE

## 2018-04-13 PROCEDURE — 83036 HEMOGLOBIN GLYCOSYLATED A1C: CPT | Performed by: FAMILY MEDICINE

## 2018-04-13 RX ORDER — DOCUSATE SODIUM 100 MG/1
100 CAPSULE, LIQUID FILLED ORAL 2 TIMES DAILY
Qty: 10 CAPSULE | Refills: 0 | Status: SHIPPED | OUTPATIENT
Start: 2018-04-13 | End: 2018-06-05 | Stop reason: SDUPTHER

## 2018-04-13 NOTE — PROGRESS NOTES
Assessment/Plan:    Chronic idiopathic constipation  Patient will start a stool softener and will increase fluids to 6-8 glasses water daily    Acquired hypothyroidism  Repeat thyroid labs in 8 weeks continue same medication    Hyperglycemia  Reviewed low sugar diet and also recommended 30-40 minutes cardiac exercise 5-7 days per week    Lower abdominal pain  Reviewed US pelvis and it is normal Patient will constipation for many years Patient will start stool softener and will also be referred to general surgery    Daytime somnolence  Will check sleep study       Diagnoses and all orders for this visit:    Acquired hypothyroidism  -     TSH, 3rd generation; Future  -     T4, free; Future    Hyperglycemia  -     POCT hemoglobin A1c    Chronic idiopathic constipation  -     docusate sodium (COLACE) 100 mg capsule; Take 1 capsule (100 mg total) by mouth 2 (two) times a day  -     Ambulatory referral to General Surgery; Future    Lower abdominal pain  -     Ambulatory referral to General Surgery; Future    Daytime somnolence  -     Home Study; Future          Subjective:   Chief Complaint   Patient presents with    Hypothyroidism    Diabetes          Patient ID: Neha Ashley is a 40 y o  female      Patient is a new patient toe me taody patient has been seeing Dr Nikolay Joe for her menometrorragia Patient had a laproscopic hysterectomy about 1 yr ago due to this Patient has been having lower abdominal pain and off since they Patient was just seen by GYN and had an ultrasound, labs done Patient last showed fasting sugar of 108 and also showed underactive thyroid Patient was started by GYN on thyroid medication and metformin for "diabetes" Patient has had 3 kids and no history of diabetes during pregnancy patient has gained 15 pounds in last one year Patient also by history has constipation she only moves her bowels one time per week Patient has had this for many years and never had pain with it Patient pain comes and goes lasts about 30-60 minutes Patient has some degres of "discomfort" all the time for several months Patient  notes patient does snore all the time Patient per  does stop breathing sometimes while sleeping in the last 3-6 months since the weight gain Patient can "fall asleep anytime" Patient does not feel refreshed from sleep         The following portions of the patient's history were reviewed and updated as appropriate: allergies, current medications, past social history and problem list     Review of Systems   Constitutional: Positive for fatigue and unexpected weight change  Negative for fever  HENT: Negative for congestion, sinus pain and trouble swallowing  Eyes: Negative for discharge and visual disturbance  Respiratory: Negative for cough, chest tightness, shortness of breath and wheezing  Cardiovascular: Negative for chest pain, palpitations and leg swelling  Gastrointestinal: Positive for abdominal pain and constipation  Negative for blood in stool, diarrhea, nausea and vomiting  Genitourinary: Negative for difficulty urinating, dysuria, frequency and hematuria  Musculoskeletal: Negative for arthralgias, gait problem and joint swelling  Skin: Negative for rash and wound  Allergic/Immunologic: Negative for environmental allergies and food allergies  Neurological: Negative for dizziness, syncope, weakness, numbness and headaches  Hematological: Negative for adenopathy  Does not bruise/bleed easily  Psychiatric/Behavioral: Positive for sleep disturbance  Negative for confusion and decreased concentration  The patient is not nervous/anxious  Objective:      /82   Ht 5' 7" (1 702 m)   Wt 94 5 kg (208 lb 6 4 oz)   LMP  (LMP Unknown)   BMI 32 64 kg/m²          Physical Exam   Constitutional: She is oriented to person, place, and time  She appears well-developed and well-nourished  HENT:   Head: Normocephalic and atraumatic     Right Ear: Hearing, tympanic membrane and external ear normal    Left Ear: Hearing, tympanic membrane and external ear normal    Eyes: Conjunctivae and EOM are normal  Pupils are equal, round, and reactive to light  Neck: Neck supple  No thyromegaly present  Cardiovascular: Normal rate and normal heart sounds  Pulmonary/Chest: Effort normal and breath sounds normal  She has no wheezes  She has no rales  Abdominal: Soft  Bowel sounds are normal  She exhibits no distension  There is tenderness  Patient has pain to palpation left lower quadrant, right lower quadrant and suprapubic pain    Musculoskeletal: She exhibits no edema or tenderness  Lymphadenopathy:     She has no cervical adenopathy  Neurological: She is alert and oriented to person, place, and time  No cranial nerve deficit  Coordination normal    Skin: Skin is warm and dry  No rash noted  Psychiatric: She has a normal mood and affect   Her behavior is normal  Judgment and thought content normal

## 2018-04-13 NOTE — PATIENT INSTRUCTIONS
Conteo básico de carbohidratos   CUIDADO AMBULATORIO:   El conteo de carbohidratos  es Saray de planificar zachariah comidas contando la cantidad de carbohidratos de los alimentos  Nataliya Bilberry son los azúcares, almidones y fibras que se encuentran en las frutas, granos, verduras y productos lácteos  Los carbohidratos [employer] niveles de azúcar en la samuel  El conteo de carbohidratos puede ayudarle a comer la cantidad [place] de carbohidratos para mantener zachariah niveles de glucosa (azúcar) en samuel bajo control  Lo que necesita saber sobre la planificación de las comidas utilizando el conteo de carbohidratos:  · Un dietista o un médico le ayudará a desarrollar un plan alimenticio saludable que trabajará mejor para usted  Le enseñarán cuántos carbohidratos debe consumir o beber en cada comida o merienda  Barn plan alimenticio estará basado en bran edad, peso, dieta usual y [address] [facility]  Si usted tiene diabetes, también incluirá bran nivel de azúcar en samuel y medicamentos para la diabetes  Cuando usted está informado de la cantidad de carbohidratos que debe consumir, entonces puede decidir los tipos de alimentos que quiere comer  · Necesitará saber qué alimentos contienen carbohidratos y cuántos contienen  Lleva la cuenta de la cantidad de carbohidratos en alimentos y meriendas para poder seguir bran plan alimenticio  No evite los carbohidratos ni omita comidas  Si usted no consume suficiente cantidad de carbohidratos u omite comidas, los niveles de azúcar en bran samuel pueden bajar excesivamente    Alimentos que contienen carbohidratos:   · Panes:  Cada porción de comida en la lista siguiente contiene cerca de 15 g de carbohidratos     ¨ 1 rebanada de pan (1 onza) o 1 tortilla de harina o maíz (6 pulgadas)    ¨ La ½ de pan para hamburguesa o ¼ de dmitriy rosquilla leif (aproximadamente 1 onza)    ¨ 1 panqueque (4 pulgadas en diámetro y ¼ de [place])    · Cereales y granos:  [address] porciones de cereales listos para comer pueden variar  Fannie el tamaño de la porción y la cantidad de carbohidratos alistados en la etiqueta alimenticia  Cada porción de comida en la lista siguiente contiene cerca de 15 g de carbohidratos     ¨ ¾ taza de cereal seco, sin endulzar, listo para comer o ¼ taza de granola baja en grasa     ¨ ½ taza de kristi u otros cereales cocidos     ¨ ? taza de arroz o pasta cocida    · Frijoles y vegetales con almidón:  Cada porción de comida en la lista siguiente contiene cerca de 15 g de carbohidratos     ¨ ½ de taza de maíz, chícharos verdes, camote o puré de papa    ¨ ¼ de dmitriy papa leif horneada    ¨ ½ taza de frijoles, lentejas y guisantes (delores, eliu, hai, bean, partido, de jay jay valentino)    · Gilbert y meriendas:  Cada porción de comida en la lista siguiente contiene cerca de 15 g de carbohidratos     ¨ 3 galletas de harina cuadradas u 8 galletas en forma de animalitos     ¨ 6 galletas saladas    ¨ 3 tazas de palomitas de [place] o ¾ onzas de pretzels, lee fritas o totopos    · Frutas:  Cada porción de comida en la lista siguiente contiene cerca de 15 g de carbohidratos     ¨ 1 pieza pequeña (4 onzas) de fruta fresca o ¾ a 1 taza de fruta fresca    ¨ ½ taza de fruta enlatada o congelada, envasada en jugo natural    ¨ ½ taza (4 onzas) de [place] de fruta sin azúcar    ¨ 2 cucharadas de fruta seca    · Alimentos azucarados o postres:  Cada porción de comida en la lista siguiente contiene cerca de 15 g de carbohidratos     ¨ 1 erick de 2 pulgadas de pastel sin glaseado o brownie     ¨ 2 galletas dulces pequeñas    ¨ ½ taza de helado, yogur congelado o yogur congelado sin lactosa    ¨ ¼ de taza de sorbete    ¨ 1 cucharada de [place] regular, mermelada o Jonas    ¨ 2 cucharadas de jarabe ligero    · Enloe y yogur:  Los alimentos derivados de la leche contienen cerca de 12 g de carbohidratos por ración      ¨ 1 taza de leche sin grasa o baja en grasa    ¨ [address] soja    ¨ 1 taza de yogur sin grasa que ha sido endulzado con endulzante artificial    · Verduras sin almidón:  Cada porción de comida contiene cerca de 5 g de carbohidratos Uriel porciones de vegetales sin almidón cuentan talat 1 porción de carbohidratos  ¨ ½ de taza de verduras cocidas o 1 taza de verduras crudas [place] incluyen remolachas, bróculi, repollo, coliflor, pepino, champiñones, tomates y calabaza  ¨ ½ taza de jugo de verduras  Cómo utilizar el conteo de carbohidratos para planificar las comidas:   · [employer] las cantidades de carbohidratos usando el tamaño de las porciones:      ¨ Ejemplo de dmitriy winsome de pasta:  Planifica comer pasta, ensalada mixta y un vaso de 8 onzas de [place]  Dubois médico le dice que puede consumir 4 porciones de carbohidratos para la winsome  Dmitriy porción de carbohidratos de pasta es ? de taza  Dmitriy taza de pasta será igual a 3 porciones de carbohidratos  Un vaso de 8 onzas de leche se cuenta talat 1 porción de carbohidratos  Estas cantidades de alimentos sería igual a 4 porciones de carbohidratos  Dmitriy taza de ensalada mixta no cuenta para las porciones de carbohidratos  · Cuente la cantidad de carbohidratos utilizando las etiquetas alimenticias:  Busque la cantidad total de los carbohidratos en los alimentos envasados leyendo la etiqueta alimenticia  Las etiquetas alimenticias explican el tamaño de la porción del alimento y la cantidad total de los carbohidratos en cada porción  Busque el tamaño de la porción en la etiqueta alimenticia y después decida cuántas porciones comerá  Multiplique el número de porciones que planea comer por la cantidad de carbohidratos por porción  ¨ Ejemplo de dmitriy merienda con dmitriy anh de granola: [place] plan de comidas le permite consumir 2 porciones de carbohidratos (30 gramos) talat bocadillo  Planea comer 1 paquete de barras de granola, el cual contiene 2 barras  Según la etiqueta alimenticia, el tamaño de la porción en rachid paquete es 1 anh   Cada porción (1 anh) contiene 25 gramos de carbohidratos  La cantidad total de carbohidratos en el paquete de barras de granola sería 50 gramos  Basándose en dubois plan alimenticio, usted debe de comer sólo 1 anh de granola  Acuda a zachariah consultas de control con dubois médico según le indicaron  Anote zachariah preguntas para que se acuerde de hacerlas isamar zachariah visitas  © 2017 [address] Information is for End User's use only and may not be sold, redistributed or otherwise used for commercial purposes  All illustrations and images included in CareNotes® are the copyrighted property of A D A M , Inc  or Markie Rocha  Esta información es sólo para uso en educación  Dubois intención no es darle un consejo médico sobre enfermedades o tratamientos  Colsulte con dubois Maryjo Every farmacéutico antes de seguir cualquier régimen médico para saber si es seguro y efectivo para usted  Estreñimiento   CUIDADO AMBULATORIO:   Estreñimiento  es cuando usted tiene evacuaciones intestinales duras y secas o pasa más tiempo de lo normal entre cada evacuación intestinal  El estreñimiento podría ser provocado por falta de agua o alimentos altos en fibra  Los medicamentos que se usan para tratar el dolor o la depresión, o la falta de actividad física también podrían provocar el estreñimiento  Los síntomas más comunes incluyen los siguientes:   · Dificultad para tener zachariah evacuaciones intestinales    · Dolor o sangrado isamar las evacuaciones intestinales    · Sensación de que usted no terminó con dubois evacuación intestinal    · Náuseas    · Distensión abdominal    · Dolor de eve  Busque atención médica inmediata para los siguientes síntomas:   · [place] en las evacuaciones intestinales    · [place] y dolor abdominal con el estreñimiento  Pregúntele a dubois médico qué vitaminas y minerales son adecuados para usted  · El estreñimiento empeora  · Usted comienza a vomitar      · Usted tiene preguntas o inquietudes acerca de bran condición o cuidado  Medicamentos:   · Un medicamento o un suplemento de fibra  podría ayudarle a suavizar las evacuaciones intestinales  Un laxante podría aflojar o ayudar a relajar zachariah intestinos para que pueda tener dmitriy evacuación intestinal  También es probable que le den medicamentos para aumentar el líquido en zachariah intestinos  El líquido puede llegar a movilizar las evacuaciones intestinales a través de zachariah intestinos  · [place] zachariah medicamentos talat se le haya indicado  Consulte con bran médico si usted jesus manuel que bran medicamento no le está ayudando o si presenta efectos secundarios  Infórmele si es alérgico a algún medicamento  Mantenga dmitriy lista actualizada de los [employer], las vitaminas y los productos herbales que gregory  Incluya los siguientes datos de los medicamentos: cantidad, frecuencia y motivo de administración  Traiga con usted la lista o los envases de la píldoras a zachariah citas de seguimiento  Lleve la lista de los medicamentos con usted en oscar de dmitriy emergencia  Controle o evite el estreñimiento:   · [place] líquidos talat se le haya indicado  Puede que necesite beber más líquidos para ayudar a ablandar las evacuaciones y evacuar el intestino  Pregunte cuánto líquido debe mateo cada día y cuáles líquidos son los más adecuados para usted  · Coma alimentos altos en fibras  [place] podría ayudar a disminuir el estreñimiento al aumentar el volumen de las evacuaciones intestinales  Alimentos altos en [address] frutas, vegetales, panes y cereales integrales, y frijoles  Bran médico o dietista puede ayudarle a crear un plan alimenticio con alto contenido de [place]  · Realice actividad física con regularidad  La actividad física regular puede ayudarle a estimular zachariah intestinos  Pregunte cuáles son los ejercicios más adecuados para usted      · Programe dmitriy hora cada día para tener dmitriy evacuación intestinal   [place] podría ayudar a bran cuerpo a acostumbrarse a tener evacuaciones intestinales regulares  Inclínese hacia adelante mientras está sentado en el inodoro para facilitar la expulsión de la evacuación intestinal  Siéntese en el inodoro al menos por 10 minutos, incluso si usted no tiene dmitriy evacuación intestinal   Acuda a zachariah consultas de control con bran médico según le indicaron  Anote zachariah preguntas para que se acuerde de hacerlas isamar zachariah visitas  © 2017 [address] [facility] Information is for End User's use only and may not be sold, redistributed or otherwise used for commercial purposes  All illustrations and images included in CareNotes® are the copyrighted property of A D A M , Inc  or [address]  Esta información es sólo para uso en educación  Bran intención no es darle un consejo médico sobre enfermedades o tratamientos  Colsulte con bran Irene Cabot farmacéutico antes de seguir cualquier régimen médico para saber si es seguro y efectivo para usted

## 2018-04-13 NOTE — ASSESSMENT & PLAN NOTE
Reviewed US pelvis and it is normal Patient will constipation for many years Patient will start stool softener and will also be referred to general surgery

## 2018-04-17 ENCOUNTER — TRANSCRIBE ORDERS (OUTPATIENT)
Dept: ADMINISTRATIVE | Facility: HOSPITAL | Age: 43
End: 2018-04-17

## 2018-04-17 ENCOUNTER — OFFICE VISIT (OUTPATIENT)
Dept: SURGERY | Facility: CLINIC | Age: 43
End: 2018-04-17
Payer: COMMERCIAL

## 2018-04-17 VITALS
HEIGHT: 67 IN | WEIGHT: 207 LBS | DIASTOLIC BLOOD PRESSURE: 70 MMHG | SYSTOLIC BLOOD PRESSURE: 120 MMHG | TEMPERATURE: 98.4 F | BODY MASS INDEX: 32.49 KG/M2 | RESPIRATION RATE: 18 BRPM | HEART RATE: 72 BPM

## 2018-04-17 DIAGNOSIS — K59.04 CHRONIC IDIOPATHIC CONSTIPATION: ICD-10-CM

## 2018-04-17 DIAGNOSIS — Z12.39 SCREENING BREAST EXAMINATION: Primary | ICD-10-CM

## 2018-04-17 DIAGNOSIS — R10.30 LOWER ABDOMINAL PAIN: ICD-10-CM

## 2018-04-17 PROCEDURE — 99242 OFF/OP CONSLTJ NEW/EST SF 20: CPT | Performed by: PHYSICIAN ASSISTANT

## 2018-04-17 NOTE — PATIENT INSTRUCTIONS
Pre-Surgery Instructions:   Medication Instructions    docusate sodium (COLACE) 100 mg capsule Stop taking 2 days prior to surgery    ibuprofen (MOTRIN) 200 mg tablet Stop taking 7 days prior to surgery    levothyroxine 50 mcg tablet Take morning of surgery    traMADol (ULTRAM) 50 mg tablet per anesthesia guidelines

## 2018-04-17 NOTE — LETTER
April 17, 2018     Christopher Ortiz DO  [address]    Patient: Nelly John   YOB: 1975   Date of Visit: 4/17/2018       Dear Dr Fatemeh Galvez:    Thank you for referring Nelly John to me for evaluation  Below are my notes for this consultation  If you have questions, please do not hesitate to call me  I look forward to following your patient along with you  Sincerely,        Charly Paula PA-C        CC: No Recipients  Heidy Harris  4/17/2018  9:40 AM  Sign at close encounter  Assessment/Plan:  Adolfo Carlson Nelly John is a 40 y  o female who is here for Diagnoses of Chronic idiopathic constipation and Lower abdominal pain were pertinent to this visit  patient with constipation and abdominal pain even with stool softeners  Intermittent nausea  Plan: will plan for Colonoscopy  Bowel regimen  Possible GI evaluation        Preoperative Clearance: None        - Patient has been instructed to avoid aspirin containing medications or non-steroidal anti-inflammatory drugs for the week preceding surgery  ______________________________________________________  CC:Constipation (Has trouble moving bowels)    HPI:  Nelly John is a 40 y  o female who was referred for evaluation of Constipation (Has trouble moving bowels)    Patient with worsening constipation since hysterectomy a year ago  Patient takes over-the-counter stool softeners without relief  Last bowel movement was 4 days ago after an enema  Associated lower abdominal pain, bloating and intermittent nausea  Denies vomiting  Patient has tried multiple over-the-counter stool softeners sometimes she had to discontinue them due to nausea and vomiting  Patient was recently informed of father diagnosed with colon cancer  Currently lower abdominal pain         ROS:  General ROS: negative  negative for - chills, fatigue, fever or night sweats, weight loss  Respiratory ROS: no cough, shortness of breath, or wheezing  Cardiovascular ROS: no chest pain or dyspnea on exertion  Genito-Urinary ROS: no dysuria, trouble voiding, or hematuria  Musculoskeletal ROS: negative for - gait disturbance, joint pain or muscle pain  Neurological ROS: no TIA or stroke symptoms  constipation and abdominal pain and see HPI  Skin ROS: no new rashes or lesions   Lymphatic ROS: no new adenopathy noted by pt  GYN ROS: see HPI, no new GYN history or bleeding noted  Psy ROS: no new mental or behavioral disturbances       Patient Active Problem List   Diagnosis    Acquired hypothyroidism    Hyperglycemia    Lower abdominal pain    Daytime somnolence    Chronic idiopathic constipation         Allergies:  Patient has no known allergies        Current Outpatient Prescriptions:     docusate sodium (COLACE) 100 mg capsule, Take 1 capsule (100 mg total) by mouth 2 (two) times a day, Disp: 10 capsule, Rfl: 0    ibuprofen (MOTRIN) 200 mg tablet, Take by mouth every 6 (six) hours as needed for mild pain, Disp: , Rfl:     levothyroxine 50 mcg tablet, Take 1 tablet (50 mcg total) by mouth daily for 30 days, Disp: 30 tablet, Rfl: 2    traMADol (ULTRAM) 50 mg tablet, Take 1-2 tablets ( mg total) by mouth every 6 (six) hours as needed for moderate pain, Disp: 40 tablet, Rfl: 0    Past Medical History:   Diagnosis Date    Abdominal pain     Breast pain     Diarrhea     Heavy menses     Irregular menses     Low back pain     Nausea & vomiting     Right shoulder pain     Uterine fibroid     Wears glasses        Past Surgical History:   Procedure Laterality Date    ABDOMINAL ADHESION SURGERY N/A 5/19/2017    Procedure: LYSIS ADHESIONS;  Surgeon: Roman Stephenson DO;  [facility];  Service:     CYSTOSCOPY N/A 5/19/2017    Procedure: Devikaey Schimke;  Surgeon: Roman Stephenson DO;  [facility];  Service:     CA LAPAROSCOPY W TOT HYSTERECTUTERUS <=250 [address] Mims TUBE/OVARY N/A 5/19/2017    Procedure: HYSTERECTOMY LAPAROSCOPIC TOTAL ,BILATERAL SALPINGECTOMY;  Surgeon: Deborah Lainez DO;  [facility];  Service: Gynecology       Family History   Problem Relation Age of Onset    Diabetes Mother     Hypertension Mother     Hyperlipidemia Mother     No Known Problems Father     No Known Problems Brother         reports that she has never smoked  She has never used smokeless tobacco  She reports that she drinks alcohol  She reports that she does not use drugs  Labs:   Lab Results   Component Value Date    WBC 10 38 (H) 05/20/2017    HGB 11 1 (L) 05/20/2017    HCT 33 0 (L) 05/20/2017    MCV 96 05/20/2017     05/20/2017     Lab Results   Component Value Date    GLUF 108 (H) 03/24/2018         Imaging: No new pertinent imaging studies  PHYSICAL EXAM  General Appearance:    Alert, cooperative, no distress,   Head:    Normocephalic without obvious abnormality   Eyes:    PERRL, conjunctiva/corneas clear, EOM's intact        Neck:   Supple, no adenopathy, no JVD   Back:     Symmetric, no spinal or CVA tenderness   Lungs:     Clear to auscultation bilaterally, no wheezing or rhonchi   Heart:    Regular rate and rhythm, S1 and S2 normal, no murmur   Abdomen:    soft mild distention, tender LLQ, + hypoactive BS   Extremities:   Extremities normal  No clubbing, cyanosis or edema   Psych:   Normal Affect, AOx3  Neurologic:  Skin:   CNII-XII intact  Strength symmetric, speech intact    Warm, dry, intact, no visible rashes or lesions                       Some portions of this record may have been generated with voice recognition software  There may be translation, syntax,  or grammatical errors  Occasional wrong word or "sound-a-like" substitutions may have occurred due to the inherent limitations of the voice recognition software  Read the chart carefully and recognize, using context, where substitutions may have occurred   If you have any questions, please contact the dictating provider for clarification or correction, as needed  This encounter has been coded by a non-certified coder         Reji Vanessa PA-C    Date: 4/17/2018 Time: 9:36 AM

## 2018-04-17 NOTE — PROGRESS NOTES
Assessment/Plan:  Maulik Swartz Errol Muro is a 40 y  o female who is here for Diagnoses of Chronic idiopathic constipation and Lower abdominal pain were pertinent to this visit  patient with constipation and abdominal pain even with stool softeners  Intermittent nausea  Plan: will plan for Colonoscopy  Bowel regimen  Possible GI evaluation        Preoperative Clearance: None        - Patient has been instructed to avoid aspirin containing medications or non-steroidal anti-inflammatory drugs for the week preceding surgery  ______________________________________________________  CC:Constipation (Has trouble moving bowels)    HPI:  Errol Muro is a 40 y  o female who was referred for evaluation of Constipation (Has trouble moving bowels)    Patient with worsening constipation since hysterectomy a year ago  Patient takes over-the-counter stool softeners without relief  Last bowel movement was 4 days ago after an enema  Associated lower abdominal pain, bloating and intermittent nausea  Denies vomiting  Patient has tried multiple over-the-counter stool softeners sometimes she had to discontinue them due to nausea and vomiting  Patient was recently informed of father diagnosed with colon cancer  Currently lower abdominal pain  ROS:  General ROS: negative  negative for - chills, fatigue, fever or night sweats, weight loss  Respiratory ROS: no cough, shortness of breath, or wheezing  Cardiovascular ROS: no chest pain or dyspnea on exertion  Genito-Urinary ROS: no dysuria, trouble voiding, or hematuria  Musculoskeletal ROS: negative for - gait disturbance, joint pain or muscle pain  Neurological ROS: no TIA or stroke symptoms  constipation and abdominal pain and see HPI  Skin ROS: no new rashes or lesions   Lymphatic ROS: no new adenopathy noted by pt     GYN ROS: see HPI, no new GYN history or bleeding noted  Psy ROS: no new mental or behavioral disturbances       Patient Active Problem List Diagnosis    Acquired hypothyroidism    Hyperglycemia    Lower abdominal pain    Daytime somnolence    Chronic idiopathic constipation         Allergies:  Patient has no known allergies  Current Outpatient Prescriptions:     docusate sodium (COLACE) 100 mg capsule, Take 1 capsule (100 mg total) by mouth 2 (two) times a day, Disp: 10 capsule, Rfl: 0    ibuprofen (MOTRIN) 200 mg tablet, Take by mouth every 6 (six) hours as needed for mild pain, Disp: , Rfl:     levothyroxine 50 mcg tablet, Take 1 tablet (50 mcg total) by mouth daily for 30 days, Disp: 30 tablet, Rfl: 2    traMADol (ULTRAM) 50 mg tablet, Take 1-2 tablets ( mg total) by mouth every 6 (six) hours as needed for moderate pain, Disp: 40 tablet, Rfl: 0    Past Medical History:   Diagnosis Date    Abdominal pain     Breast pain     Diarrhea     Heavy menses     Irregular menses     Low back pain     Nausea & vomiting     Right shoulder pain     Uterine fibroid     Wears glasses        Past Surgical History:   Procedure Laterality Date    ABDOMINAL ADHESION SURGERY N/A 5/19/2017    Procedure: LYSIS ADHESIONS;  Surgeon: Sanjay Giron DO;  [facility];  Service:     CYSTOSCOPY N/A 5/19/2017    Procedure: Evone Bernalillo;  Surgeon: Sanjay Giron DO;  [facility];  Service:     PA LAPAROSCOPY W TOT HYSTERECTUTERUS <=250 GRAM  W TUBE/OVARY N/A 5/19/2017    Procedure: HYSTERECTOMY LAPAROSCOPIC TOTAL ,BILATERAL SALPINGECTOMY;  Surgeon: Sanjay Giron DO;  [facility];  Service: Gynecology       Family History   Problem Relation Age of Onset    Diabetes Mother     Hypertension Mother     Hyperlipidemia Mother     No Known Problems Father     No Known Problems Brother         reports that she has never smoked  She has never used smokeless tobacco  She reports that she drinks alcohol  She reports that she does not use drugs      Labs:   Lab Results   Component Value Date    WBC 10 38 (H) 05/20/2017 HGB 11 1 (L) 05/20/2017    HCT 33 0 (L) 05/20/2017    MCV 96 05/20/2017     05/20/2017     Lab Results   Component Value Date    GLUF 108 (H) 03/24/2018         Imaging: No new pertinent imaging studies  PHYSICAL EXAM  General Appearance:    Alert, cooperative, no distress,   Head:    Normocephalic without obvious abnormality   Eyes:    PERRL, conjunctiva/corneas clear, EOM's intact        Neck:   Supple, no adenopathy, no JVD   Back:     Symmetric, no spinal or CVA tenderness   Lungs:     Clear to auscultation bilaterally, no wheezing or rhonchi   Heart:    Regular rate and rhythm, S1 and S2 normal, no murmur   Abdomen:    soft mild distention, tender LLQ, + hypoactive BS   Extremities:   Extremities normal  No clubbing, cyanosis or edema   Psych:   Normal Affect, AOx3  Neurologic:  Skin:   CNII-XII intact  Strength symmetric, speech intact    Warm, dry, intact, no visible rashes or lesions                       Some portions of this record may have been generated with voice recognition software  There may be translation, syntax,  or grammatical errors  Occasional wrong word or "sound-a-like" substitutions may have occurred due to the inherent limitations of the voice recognition software  Read the chart carefully and recognize, using context, where substitutions may have occurred  If you have any questions, please contact the dictating provider for clarification or correction, as needed  This encounter has been coded by a non-certified coder         Amy Judge PA-C    Date: 4/17/2018 Time: 9:36 AM

## 2018-04-18 PROBLEM — R10.9 ABDOMINAL PAIN: Status: ACTIVE | Noted: 2018-04-18

## 2018-04-18 PROBLEM — K59.00 CONSTIPATION: Status: ACTIVE | Noted: 2018-04-18

## 2018-04-23 ENCOUNTER — ANESTHESIA EVENT (OUTPATIENT)
Dept: GASTROENTEROLOGY | Facility: HOSPITAL | Age: 43
End: 2018-04-23
Payer: COMMERCIAL

## 2018-04-23 ENCOUNTER — HOSPITAL ENCOUNTER (OUTPATIENT)
Dept: MAMMOGRAPHY | Facility: HOSPITAL | Age: 43
Discharge: HOME | End: 2018-04-23
Attending: OBSTETRICS & GYNECOLOGY
Payer: COMMERCIAL

## 2018-04-23 DIAGNOSIS — Z12.39 SCREENING BREAST EXAMINATION: ICD-10-CM

## 2018-04-23 PROCEDURE — 77067 SCR MAMMO BI INCL CAD: CPT

## 2018-04-24 ENCOUNTER — HOSPITAL ENCOUNTER (OUTPATIENT)
Facility: HOSPITAL | Age: 43
Setting detail: OUTPATIENT SURGERY
Discharge: HOME | End: 2018-04-24
Attending: SURGERY | Admitting: SURGERY
Payer: COMMERCIAL

## 2018-04-24 ENCOUNTER — ANESTHESIA (OUTPATIENT)
Dept: GASTROENTEROLOGY | Facility: HOSPITAL | Age: 43
End: 2018-04-24
Payer: COMMERCIAL

## 2018-04-24 VITALS
WEIGHT: 208 LBS | SYSTOLIC BLOOD PRESSURE: 121 MMHG | BODY MASS INDEX: 32.65 KG/M2 | HEIGHT: 67 IN | DIASTOLIC BLOOD PRESSURE: 80 MMHG | OXYGEN SATURATION: 100 % | RESPIRATION RATE: 13 BRPM | TEMPERATURE: 97.1 F | HEART RATE: 62 BPM

## 2018-04-24 PROCEDURE — 45378 DIAGNOSTIC COLONOSCOPY: CPT | Performed by: SURGERY

## 2018-04-24 RX ORDER — SODIUM CHLORIDE 9 MG/ML
125 INJECTION, SOLUTION INTRAVENOUS CONTINUOUS
Status: DISCONTINUED | OUTPATIENT
Start: 2018-04-24 | End: 2018-04-24 | Stop reason: HOSPADM

## 2018-04-24 RX ORDER — PROPOFOL 10 MG/ML
INJECTION, EMULSION INTRAVENOUS AS NEEDED
Status: DISCONTINUED | OUTPATIENT
Start: 2018-04-24 | End: 2018-04-24 | Stop reason: SURG

## 2018-04-24 RX ORDER — LIDOCAINE HYDROCHLORIDE 10 MG/ML
INJECTION, SOLUTION EPIDURAL; INFILTRATION; INTRACAUDAL; PERINEURAL AS NEEDED
Status: DISCONTINUED | OUTPATIENT
Start: 2018-04-24 | End: 2018-04-24 | Stop reason: SURG

## 2018-04-24 RX ORDER — PROPOFOL 10 MG/ML
INJECTION, EMULSION INTRAVENOUS CONTINUOUS PRN
Status: DISCONTINUED | OUTPATIENT
Start: 2018-04-24 | End: 2018-04-24 | Stop reason: SURG

## 2018-04-24 RX ADMIN — PROPOFOL 100 MG: 10 INJECTION, EMULSION INTRAVENOUS at 10:55

## 2018-04-24 RX ADMIN — PROPOFOL 20 MG: 10 INJECTION, EMULSION INTRAVENOUS at 11:01

## 2018-04-24 RX ADMIN — PROPOFOL 140 MCG/KG/MIN: 10 INJECTION, EMULSION INTRAVENOUS at 10:55

## 2018-04-24 RX ADMIN — SODIUM CHLORIDE 125 ML/HR: 0.9 INJECTION, SOLUTION INTRAVENOUS at 10:29

## 2018-04-24 RX ADMIN — PROPOFOL 20 MG: 10 INJECTION, EMULSION INTRAVENOUS at 10:57

## 2018-04-24 RX ADMIN — LIDOCAINE HYDROCHLORIDE 30 MG: 10 INJECTION, SOLUTION EPIDURAL; INFILTRATION; INTRACAUDAL; PERINEURAL at 10:55

## 2018-04-24 RX ADMIN — PROPOFOL 40 MG: 10 INJECTION, EMULSION INTRAVENOUS at 10:59

## 2018-04-24 NOTE — LETTER
COLONOSCOPY NORMAL    Patient: Tao Anderson   YOB: 1975    MRN: [id number]    Surgery Date: 04/24/2018       Dear Doctor,    I performed a colonoscopy on your patient, Tao Anderson  Unfortunately, her colon preparation was very poor  Although the lumen was visualized throughout the procedure, any mucosal lesions would not have been seen        Limited diagnostic view shows that  there was no major obstructing lesion  No moderate size polyps were visible, and no obvious ulcerations of the mucosa        Ms Odessa Estrada will need a repeat colonoscopy in [place] [address] for surveillance due to her poor preparation  Thank you for allowing me to participate in the care of this pleasant patient  I will keep you informed of any further updates      Sincerely,

## 2018-04-24 NOTE — ANESTHESIA PREPROCEDURE EVALUATION
Review of Systems/Medical History  Patient summary reviewed  Chart reviewed      Cardiovascular  Negative cardio ROS    Pulmonary  Negative pulmonary ROS        GI/Hepatic  Negative GI/hepatic ROS   Bowel prep       Negative  ROS        Endo/Other  Negative endo/other ROS History of thyroid disease , hypothyroidism,      GYN  Negative gynecology ROS          Hematology  Negative hematology ROS      Musculoskeletal  Negative musculoskeletal ROS Back pain , lumbar pain,        Neurology  Negative neurology ROS      Psychology   Negative psychology ROS              Physical Exam    Airway    Mallampati score: II  TM Distance: >3 FB  Neck ROM: full     Dental   No notable dental hx     Cardiovascular  Comment: Negative ROS, Rhythm: regular, Rate: normal, Cardiovascular exam normal    Pulmonary  Pulmonary exam normal Breath sounds clear to auscultation,     Other Findings        Anesthesia Plan  ASA Score- 2     Anesthesia Type- IV sedation with anesthesia with ASA Monitors  Additional Monitors:   Airway Plan:         Plan Factors- Patient instructed to abstain from smoking on day of procedure  Patient did not smoke on day of surgery  Induction- intravenous  Postoperative Plan-     Informed Consent- Anesthetic plan and risks discussed with patient

## 2018-04-24 NOTE — DISCHARGE INSTRUCTIONS
[facility]  Endoscopy Post-Operative Instructions  Dr Tevin Goel MD, FACS    Procedure: Colonoscopy    Findings:  Normal colon, no significant findings, although the prep was poor, so small polyps or ulcerations may not have been seen because of the 30 colon  I would recommend follow-up with gastroenterology for your irritable bowel symptoms for with us in 6 months for repeat colonoscopy  Follow-Up: You will need a repeat Endoscopy in (generally)6 mo to one year    Will await final pathology report for final determination of number of years until your follow up endoscopy, if you had polyps on this exam   Different types of polyps require different lengths of follow up surveillance  Please call our office or your primary doctor's office if you have any questions, once the report is returned  You should have an endoscopy sooner than recommended if you have any symptoms of bleeding or change in stools or other concerns  You will receive a call from our office with your results, in addition to the the preliminary results you received today  You will usually receive a follow-up letter from our office in 1-2 weeks  Call the office if you do not hear from us  You are welcome to also schedule an office visit if desired to discuss the results further  It is your responsibility to contact our office for results in 1- 2 weeks if you do not hear from us  If a follow up endoscopy is needed, you are responsible for arranging that follow up appointment at the appropriate time  The office may or may not issue a reminder at that future time  Please take responsibility for your own follow up healthcare  Diet: Eat a light snack first, and then resume your previous diet  Discharge Medications:  See after visit summary for reconciled discharge medications provided to patient and family        Current Facility-Administered Medications:     sodium chloride 0 9 % infusion, 125 mL/hr, Intravenous, Continuous, Sara Shaffer DO, Last Rate: 125 mL/hr at 04/24/18 1029, 125 mL/hr at 04/24/18 1029  Do not take aspirin or blood thinning medications for 2 days following procedure  Tylenol is okay  You may have been given a new medication  Please take this (usually an anti-ulcer -type medication) and see your primary care doctor for refills and follow up medications if needed       After the test: Notify physician for arm swelling or pain at the intravenous site  You may have some abdominal discomfort following the procedure  Belching or passing flatus will help relieve it  Following upper endoscopy, you may experience a temporary sore throat  Saline gargles or lozenges can be taken to relieve sore throat Following lower endoscopy, minor rectal bleeding is not uncommon      Activity: Do not drive a car, operate machinery, or sign legal documents for 24 hours after your procedure   Normal activity may be resumed on the day following the procedure      Call the office at [phone] for any of the following: Severe abdominal pain, significant rectal bleeding, chills, or fever above 100°, new onset of persistent cough or persistent vomiting      [facility]  [address], [address]  [place] [address]   Phone: [phone]

## 2018-04-24 NOTE — ANESTHESIA POSTPROCEDURE EVALUATION
Post-Op Assessment Note      CV Status:  Stable    Mental Status:  Alert and awake    Hydration Status:  Euvolemic    PONV Controlled:  Controlled    Airway Patency:  Patent    Post Op Vitals Reviewed:  Yes              /71 (04/24/18 1111)    Temp     Pulse 65 (04/24/18 1111)   Resp 12 (04/24/18 1111)    SpO2 99 % (04/24/18 1111)

## 2018-04-24 NOTE — H&P (VIEW-ONLY)
Assessment/Plan:  Staci Tomlin Amado Matamoros is a 40 y  o female who is here for Diagnoses of Chronic idiopathic constipation and Lower abdominal pain were pertinent to this visit  patient with constipation and abdominal pain even with stool softeners  Intermittent nausea  Plan: will plan for Colonoscopy  Bowel regimen  Possible GI evaluation        Preoperative Clearance: None        - Patient has been instructed to avoid aspirin containing medications or non-steroidal anti-inflammatory drugs for the week preceding surgery  ______________________________________________________  CC:Constipation (Has trouble moving bowels)    HPI:  Amado Matamoros is a 40 y  o female who was referred for evaluation of Constipation (Has trouble moving bowels)    Patient with worsening constipation since hysterectomy a year ago  Patient takes over-the-counter stool softeners without relief  Last bowel movement was 4 days ago after an enema  Associated lower abdominal pain, bloating and intermittent nausea  Denies vomiting  Patient has tried multiple over-the-counter stool softeners sometimes she had to discontinue them due to nausea and vomiting  Patient was recently informed of father diagnosed with colon cancer  Currently lower abdominal pain  ROS:  General ROS: negative  negative for - chills, fatigue, fever or night sweats, weight loss  Respiratory ROS: no cough, shortness of breath, or wheezing  Cardiovascular ROS: no chest pain or dyspnea on exertion  Genito-Urinary ROS: no dysuria, trouble voiding, or hematuria  Musculoskeletal ROS: negative for - gait disturbance, joint pain or muscle pain  Neurological ROS: no TIA or stroke symptoms  constipation and abdominal pain and see HPI  Skin ROS: no new rashes or lesions   Lymphatic ROS: no new adenopathy noted by pt     GYN ROS: see HPI, no new GYN history or bleeding noted  Psy ROS: no new mental or behavioral disturbances       Patient Active Problem List Diagnosis    Acquired hypothyroidism    Hyperglycemia    Lower abdominal pain    Daytime somnolence    Chronic idiopathic constipation         Allergies:  Patient has no known allergies  Current Outpatient Prescriptions:     docusate sodium (COLACE) 100 mg capsule, Take 1 capsule (100 mg total) by mouth 2 (two) times a day, Disp: 10 capsule, Rfl: 0    ibuprofen (MOTRIN) 200 mg tablet, Take by mouth every 6 (six) hours as needed for mild pain, Disp: , Rfl:     levothyroxine 50 mcg tablet, Take 1 tablet (50 mcg total) by mouth daily for 30 days, Disp: 30 tablet, Rfl: 2    traMADol (ULTRAM) 50 mg tablet, Take 1-2 tablets ( mg total) by mouth every 6 (six) hours as needed for moderate pain, Disp: 40 tablet, Rfl: 0    Past Medical History:   Diagnosis Date    Abdominal pain     Breast pain     Diarrhea     Heavy menses     Irregular menses     Low back pain     Nausea & vomiting     Right shoulder pain     Uterine fibroid     Wears glasses        Past Surgical History:   Procedure Laterality Date    ABDOMINAL ADHESION SURGERY N/A 5/19/2017    Procedure: LYSIS ADHESIONS;  Surgeon: Jazmine Rapp DO;  [facility];  Service:     CYSTOSCOPY N/A 5/19/2017    Procedure: Jim Dues;  Surgeon: aJzmine Rapp DO;  [facility];  Service:     [place] LAPAROSCOPY W TOT HYSTERECTUTERUS <=250 GRAM  W TUBE/OVARY N/A 5/19/2017    Procedure: HYSTERECTOMY LAPAROSCOPIC TOTAL ,BILATERAL SALPINGECTOMY;  Surgeon: Jazmine Rapp DO;  [facility];  Service: Gynecology       Family History   Problem Relation Age of Onset    Diabetes Mother     Hypertension Mother     Hyperlipidemia Mother     No Known Problems Father     No Known Problems Brother         reports that she has never smoked  She has never used smokeless tobacco  She reports that she drinks alcohol  She reports that she does not use drugs      Labs:   Lab Results   Component Value Date    WBC 10 38 (H) 05/20/2017 HGB 11 1 (L) 05/20/2017    HCT 33 0 (L) 05/20/2017    MCV 96 05/20/2017     05/20/2017     Lab Results   Component Value Date    GLUF 108 (H) 03/24/2018         Imaging: No new pertinent imaging studies  PHYSICAL EXAM  General Appearance:    Alert, cooperative, no distress,   Head:    Normocephalic without obvious abnormality   Eyes:    PERRL, conjunctiva/corneas clear, EOM's intact        Neck:   Supple, no adenopathy, no JVD   Back:     Symmetric, no spinal or CVA tenderness   Lungs:     Clear to auscultation bilaterally, no wheezing or rhonchi   Heart:    Regular rate and rhythm, S1 and S2 normal, no murmur   Abdomen:    soft mild distention, tender LLQ, + hypoactive BS   Extremities:   Extremities normal  No clubbing, cyanosis or edema   Psych:   Normal Affect, AOx3  Neurologic:  Skin:   CNII-XII intact  Strength symmetric, speech intact    Warm, dry, intact, no visible rashes or lesions                       Some portions of this record may have been generated with voice recognition software  There may be translation, syntax,  or grammatical errors  Occasional wrong word or "sound-a-like" substitutions may have occurred due to the inherent limitations of the voice recognition software  Read the chart carefully and recognize, using context, where substitutions may have occurred  If you have any questions, please contact the dictating provider for clarification or correction, as needed  This encounter has been coded by a non-certified coder         Lyly Navarrete PA-C    Date: 4/17/2018 Time: 9:36 AM

## 2018-04-24 NOTE — OP NOTE
COLONOSCOPY  Postoperative Note  PATIENT NAME: Delores Sun  : 1975  MRN: [id number]  [facility] ROOM 01    Surgery Date: 2018    Pre operative diagnosis:  Abdominal pain, unspecified abdominal location [R10 9]  Constipation, unspecified constipation type [K59 00]    Operative Indications:  Due for Colonoscopy :    Previous Colonoscopy if any, and  Location of polyps if any:   none          Consent:  The risks, benefits, and alternatives to the surgery were discussed with the patient and with the family prior to surgery if available, personally by Dr Joon Padilla  If the consent was obtained by the physician assistant or other representative, the consent was reviewed once again personally by the operating physician  Common complications particular for this procedure as well as unusual complications were discussed, including but not limited to:  bleeding, wound infection, prolonged wound healing, open wounds, reoperation, leak from the bowel or viscus, leak from the bile duct or injury to adjacent or other organs or blood vessels in the abdomen, and possible surgery or reoperation  If the surgery was laparoscopic, it was discussed that possible open surgery could also occur during that same surgery and is always an option in laparoscopic surgery  A  was used if necessary  The patient expressed understanding of the issues discussed and wished and consented to the procedure to proceed  All questions were answered  Dr Joon Padilla personally discussed the informed consent with this patient  Post-Operative Diagnosis and Findings:   Normal colon    Procedure:   Procedure(s):  COLONOSCOPY    Surgeon(s) and Role:     * Nathen Sacks, MD - Primary  I was present for the entire procedure  Specimens:  * No specimens in log *    Estimated Blood Loss:   Minimal    Anesthesia Type:   Choice     Procedure: The patient was seen in the Holding Room   The risks, benefits, complications, treatment options, and expected outcomes were discussed with the patient  The possibilities of reaction to medication, pulmonary aspiration, perforation of viscus, bleeding, recurrent infection, the need for additional procedures, failure to diagnose a condition, and creating a complication requiring transfusion or operation were discussed with the patient  The patient concurred with the proposed plan, giving informed consent  The patient was taken to Endoscopy Suite, identified as [employer]  Staff confirmed patient name, , and procedure  A Time Out was held and the above information confirmed  A visual and digital exam was carried out of the anus and anal canal   Findings were normal unless specified below  The colonoscope was passed per anus and traversed to the cecum  The cecum was clearly visualized in the right lower quadrant by light reflex and palpation  The scope was withdrawn  There were no mucosal lesions or polyps seen throughout the colon  The prep was quite poor  Although the lumen was visualized throughout the procedure, the mucosal lesions would not have been seen  Limited diagnostic view shows that  there was no major obstructing lesion,    No moderate size polyp visible, and no obvious ulcerations of the mucosa  A photograph was taken  There were no anorectal lesions other than the hemorrhoids  The scope was removed  The patient tolerated the procedure well  Withdrawal time was greater than 6 minutes  Prep was poor at a 1/5  Some portions of this record may have been generated with voice recognition software  There may be translation, syntax,  or grammatical errors  Occasional wrong word or "sound-a-like" substitutions may have occurred due to the inherent limitations of the voice recognition software  Read the chart carefully and recognize, using context, where substations may have occurred   If you have any questions, please contact the dictating provider for clarification or correction, as needed  Complications: None    Condition: Stable to PACU      Follow up endoscopy: Follow-up colonoscopy timing is difficult to predict without pathology and is not an exact science  Patients are told to follow up earlier than recommended if they have any symptoms or GI changes  However it is required that we predict possible endoscopy follow-up at the time of this procedure  Based on clinical appearance, follow-up colonoscopy is estimated to be recommended  in:  6 months for poor preparation        SIGNATURE: Damián Jin MD   DATE: April 24, 2018   TIME: 11:09 AM

## 2018-04-24 NOTE — DISCHARGE SUMMARY
Discharge Summary - Chrissy Gardiner 40 y o  female MRN: [id number]    Unit/Bed#: [facility] Encounter: [id number]      Pre-Operative Diagnosis: Pre-Op Diagnosis Codes:     * Abdominal pain, unspecified abdominal location [R10 9]     * Constipation, unspecified constipation type [K59 00]    Post-Operative Diagnosis: Post-Op Diagnosis Codes:     * Abdominal pain, unspecified abdominal location [R10 9]     * Constipation, unspecified constipation type [K59 00]    Procedures Performed:  Procedure(s):  COLONOSCOPY    Surgeon: Criss Ricci MD    See H & P for full details of admission and Operative Note for full details of operations performed  Patient was seen and examined prior to discharge  Provisions for Follow-Up Care:  See After Visit Summary for information related to follow-up care and home orders  Disposition: Home, in stable condition  Planned Readmission: No    Discharge Medications:  See after visit summary for reconciled discharge medications provided to patient and family  Post Operative instructions: Reviewed with patient and/or family  Some portions of this record may have been generated with voice recognition software  There may be translation, syntax,  or grammatical errors  Occasional wrong word or "sound-a-like" substitutions may have occurred due to the inherent limitations of the voice recognition software  Read the chart carefully and recognize, using context, where substitutions may have occurred  If you have any questions, please contact the dictating provider for clarification or correction, as needed  This encounter has been coded by non certified Coder      Signature:   Criss Ricci MD  Date: 4/24/2018 Time: 11:11 AM

## 2018-04-25 ENCOUNTER — TELEPHONE (OUTPATIENT)
Dept: SURGERY | Facility: CLINIC | Age: 43
End: 2018-04-25

## 2018-04-25 NOTE — TELEPHONE ENCOUNTER
Spoke w/ pt and she states she is fine today, returned to everyday adl's and is tolerating po intake  No c/o discomfort  Discussed the 6 month f/u due to poor prep or she can f/u with Gi  Pt verbalized understanding at this time

## 2018-05-09 ENCOUNTER — OFFICE VISIT (OUTPATIENT)
Dept: OBGYN CLINIC | Facility: CLINIC | Age: 43
End: 2018-05-09
Payer: COMMERCIAL

## 2018-05-09 VITALS
HEIGHT: 67 IN | SYSTOLIC BLOOD PRESSURE: 120 MMHG | DIASTOLIC BLOOD PRESSURE: 80 MMHG | WEIGHT: 211 LBS | BODY MASS INDEX: 33.12 KG/M2

## 2018-05-09 DIAGNOSIS — R10.2 PELVIC PAIN IN FEMALE: Primary | ICD-10-CM

## 2018-05-09 DIAGNOSIS — R63.5 EXCESSIVE WEIGHT GAIN: ICD-10-CM

## 2018-05-09 DIAGNOSIS — Z90.710 STATUS POST LAPAROSCOPIC HYSTERECTOMY: ICD-10-CM

## 2018-05-09 DIAGNOSIS — R73.01 ELEVATED FASTING BLOOD SUGAR: ICD-10-CM

## 2018-05-09 DIAGNOSIS — Z90.79 STATUS POST BILATERAL SALPINGECTOMY: ICD-10-CM

## 2018-05-09 PROCEDURE — 99214 OFFICE O/P EST MOD 30 MIN: CPT | Performed by: OBSTETRICS & GYNECOLOGY

## 2018-05-09 NOTE — PROGRESS NOTES
Assessment/Plan:   Here for follow-up  Status post total laparoscopic hysterectomy with bilateral salpingectomy in 2017  Concern with good recurrent pelvic pain symptoms  Inability to lose weight    Plan  Follow-up within the next 4 weeks to schedule annual exam   Discussed diet and exercise  Follow-up with PCP      Diagnoses and all orders for this visit:    Pelvic pain in female    Status post laparoscopic hysterectomy    Status post bilateral salpingectomy    Excessive weight gain    Elevated fasting blood sugar        Subjective:  Ongoing weight gain, pelvic pain     Patient ID: Rekha Tejada is a 40 y o  female  HPI   20-year-old female status post total laparoscopic hysterectomy in 2017 for pelvic pain symptoms  Initially patient was doing better was seen in late March for recurrent pain symptoms  Follow-up pelvic ultrasound was within normal limits both ovaries were normal in size and consistency  She does use Tylenol for and tramadol for her pain symptoms  As some vaginal dryness and minimal discomfort with intercourse  She did have an elevated fasting blood sugar was started on metformin TSH was also elevated was started on levothyroxine  Both of these medications have been discontinued by her PCP  Patient main concerns about her continue to gain weight and inability to lose  We did discussed diet and exercise  She recently had a colonoscopy which was normal   She also has issues with constipation  She will return within the next 4 weeks for annual exam     Review of Systems   Genitourinary: Positive for dyspareunia and pelvic pain  All other systems reviewed and are negative  Objective:  No acute distress     Physical Exam   Constitutional: She is oriented to person, place, and time  She appears well-developed and well-nourished  HENT:   Head: Normocephalic  Eyes: Pupils are equal, round, and reactive to light  Neck: Normal range of motion     Musculoskeletal: Normal range of motion  Neurological: She is alert and oriented to person, place, and time  Skin: Skin is warm and dry  Psychiatric: She has a normal mood and affect

## 2018-05-23 ENCOUNTER — HOSPITAL ENCOUNTER (OUTPATIENT)
Dept: SLEEP CENTER | Facility: CLINIC | Age: 43
Discharge: HOME | End: 2018-05-23
Payer: COMMERCIAL

## 2018-05-23 DIAGNOSIS — R40.0 DAYTIME SOMNOLENCE: ICD-10-CM

## 2018-05-23 PROCEDURE — G0399 HOME SLEEP TEST/TYPE 3 PORTA: HCPCS | Performed by: PSYCHIATRY & NEUROLOGY

## 2018-05-23 PROCEDURE — G0399 HOME SLEEP TEST/TYPE 3 PORTA: HCPCS

## 2018-06-01 ENCOUNTER — TELEPHONE (OUTPATIENT)
Dept: SLEEP CENTER | Facility: CLINIC | Age: 43
End: 2018-06-01

## 2018-06-05 ENCOUNTER — OFFICE VISIT (OUTPATIENT)
Dept: FAMILY MEDICINE CLINIC | Facility: CLINIC | Age: 43
End: 2018-06-05
Payer: COMMERCIAL

## 2018-06-05 VITALS
HEIGHT: 67 IN | BODY MASS INDEX: 33.43 KG/M2 | WEIGHT: 213 LBS | DIASTOLIC BLOOD PRESSURE: 80 MMHG | SYSTOLIC BLOOD PRESSURE: 118 MMHG

## 2018-06-05 DIAGNOSIS — K59.04 CHRONIC IDIOPATHIC CONSTIPATION: ICD-10-CM

## 2018-06-05 DIAGNOSIS — E66.09 CLASS 1 OBESITY DUE TO EXCESS CALORIES WITHOUT SERIOUS COMORBIDITY WITH BODY MASS INDEX (BMI) OF 33.0 TO 33.9 IN ADULT: ICD-10-CM

## 2018-06-05 DIAGNOSIS — M54.50 ACUTE BILATERAL LOW BACK PAIN WITHOUT SCIATICA: ICD-10-CM

## 2018-06-05 DIAGNOSIS — R40.0 DAYTIME SOMNOLENCE: ICD-10-CM

## 2018-06-05 DIAGNOSIS — G47.33 OBSTRUCTIVE SLEEP APNEA: ICD-10-CM

## 2018-06-05 DIAGNOSIS — E03.9 ACQUIRED HYPOTHYROIDISM: Primary | ICD-10-CM

## 2018-06-05 PROBLEM — K59.00 CONSTIPATION: Status: RESOLVED | Noted: 2018-04-18 | Resolved: 2018-06-05

## 2018-06-05 PROCEDURE — 99214 OFFICE O/P EST MOD 30 MIN: CPT | Performed by: FAMILY MEDICINE

## 2018-06-05 RX ORDER — DOCUSATE SODIUM 100 MG/1
100 CAPSULE, LIQUID FILLED ORAL 2 TIMES DAILY
Qty: 60 CAPSULE | Refills: 3 | Status: SHIPPED | OUTPATIENT
Start: 2018-06-05 | End: 2018-10-30 | Stop reason: SDUPTHER

## 2018-06-05 RX ORDER — POLYETHYLENE GLYCOL 3350 17 G/17G
POWDER, FOR SOLUTION ORAL
Qty: 850 G | Refills: 1 | Status: SHIPPED | OUTPATIENT
Start: 2018-06-05 | End: 2020-05-06

## 2018-06-05 RX ORDER — IBUPROFEN 600 MG/1
TABLET ORAL
Qty: 60 TABLET | Refills: 0 | Status: SHIPPED | OUTPATIENT
Start: 2018-06-05 | End: 2019-07-29 | Stop reason: SDUPTHER

## 2018-06-05 NOTE — ASSESSMENT & PLAN NOTE
Home study is negative for sleep apnea yet her oxygen did drop some will simply refer to sleep medicine as she is also having the daytime tiredness and also the difficulty falling asleep I did ask repeatedly about depression and patient denies that

## 2018-06-05 NOTE — ASSESSMENT & PLAN NOTE
Colace twice daily with miralax Patient needs to eat more fiber 5-7 servings of fruits and vegetables Patient should see the surgeon for followup

## 2018-06-05 NOTE — PROGRESS NOTES
Assessment/Plan:    Chronic idiopathic constipation  Colace twice daily with miralax Patient needs to eat more fiber 5-7 servings of fruits and vegetables Patient should see the surgeon for followup    Acquired hypothyroidism  Repeat TSH T4 after 6/13/2018    Obstructive sleep apnea  Home study is negative for sleep apnea yet her oxygen did drop some will simply refer to sleep medicine as she is also having the daytime tiredness and also the difficulty falling asleep I did ask repeatedly about depression and patient denies that    Acute bilateral low back pain without sciatica  Patient to take the ibuprofen as directed Patient to have PT    Daytime somnolence  Refer for sleep medicine eval       Diagnoses and all orders for this visit:    Acquired hypothyroidism    Chronic idiopathic constipation  -     docusate sodium (COLACE) 100 mg capsule; Take 1 capsule (100 mg total) by mouth 2 (two) times a day  -     polyethylene glycol (GLYCOLAX) powder; 2 tablespoons in 8 oz water daily    Obstructive sleep apnea  -     Ambulatory referral to Sleep Medicine; Future    Daytime somnolence  -     Ambulatory referral to Sleep Medicine; Future    Class 1 obesity due to excess calories without serious comorbidity with body mass index (BMI) of 33 0 to 33 9 in adult  -     Ambulatory referral to Weight Management; Future    Acute bilateral low back pain without sciatica  -     ibuprofen (MOTRIN) 600 mg tablet; 1 tablet every 8 hours for 10 days then as needed for low back pain  -     Ambulatory referral to Physical Therapy; Future          Subjective:   Chief Complaint   Patient presents with    Hypothyroidism     refill med 90 day          Patient ID: Raisa Vigil is a 40 y o  female      Patient is here for followup of her abdominal painm her constipation, her thyroid , her sleep issues, her weight and now a new issue with low back pain on the right Patient saw the surgeon and had colonoscopy and it was a poor prep so she has to have a repeat Patient is still having constipation She is taking the stool softener but not the fiber supplement Patient is moving her bowels about 3 times per week Patient abdominal pain is still there and comes and goes Patient is taking the thyroid medication patient needs repeat labs after 6/13/2018 Patient sleep study did not show any sleep apnea Patient however is still tired all the time has isseus falling asleep and does not stay asleep then she will stay in bed until noon This means she does not eat until at least noon and then has supper Patient is not counting calories She admits to alos of caffeein Patient is never rested and is tired all the time Patient has not seen a sleep doctor Patient also has gained 5 pounds She has no idea how as she "does not eat much" She does walk about 2 times per week Patient is now also having low back pain  Patient pain is mainly on the left She ahs had it for 2 weeks finds if she takes ibuprofen it does help She has not had any injury        The following portions of the patient's history were reviewed and updated as appropriate: allergies, current medications, past social history and problem list     Review of Systems   Constitutional: Positive for appetite change and unexpected weight change  Negative for activity change  HENT: Negative for congestion, postnasal drip, rhinorrhea, sinus pain and sinus pressure  Eyes: Negative  Respiratory: Negative for cough, chest tightness and shortness of breath  Cardiovascular: Negative for chest pain and palpitations  Gastrointestinal: Positive for abdominal pain and constipation  Genitourinary: Negative for difficulty urinating, dyspareunia, dysuria, flank pain and frequency  Musculoskeletal: Positive for back pain  Neurological: Negative for dizziness and headaches  Psychiatric/Behavioral: Negative for agitation, confusion, decreased concentration and sleep disturbance   The patient is not nervous/anxious  Objective:      /80   Ht 5' 7" (1 702 m)   Wt 96 6 kg (213 lb)   LMP  (LMP Unknown)   BMI 33 36 kg/m²          Physical Exam   Constitutional: She is oriented to person, place, and time  She appears well-developed and well-nourished  HENT:   Head: Normocephalic and atraumatic  Right Ear: Hearing, tympanic membrane and external ear normal    Left Ear: Hearing, tympanic membrane and external ear normal    Eyes: Conjunctivae and EOM are normal  Pupils are equal, round, and reactive to light  Neck: Neck supple  No thyromegaly present  Cardiovascular: Normal rate and normal heart sounds  Pulmonary/Chest: Effort normal and breath sounds normal  She has no wheezes  She has no rales  Abdominal: Soft  Bowel sounds are normal  She exhibits no distension  There is no tenderness  Musculoskeletal: She exhibits no edema or tenderness  Decrease ROM of the lumbar spine flexion is to 45 side bending and rotaion b/l to 30 extension is at 0 She is able to toe and heel walk She has 5/5 strenght = b/l lower extremities Patient has negative straight leg raising test b/l    Lymphadenopathy:     She has no cervical adenopathy  Neurological: She is alert and oriented to person, place, and time  She has normal reflexes  No cranial nerve deficit  Coordination normal    Skin: Skin is warm and dry  No rash noted  Psychiatric: She has a normal mood and affect   Her behavior is normal  Judgment and thought content normal

## 2018-06-05 NOTE — PATIENT INSTRUCTIONS
Constipation   AMBULATORY CARE:   Constipation  is when you have hard, dry bowel movements, or you go longer than usual between bowel movements  Constipation may be caused by a lack of water or high-fiber foods  Medicines used to treat pain or depression, or a lack of physical activity may also cause constipation  Common symptoms include the following:   · Trouble pushing out your bowel movement    · Pain or bleeding during your bowel movement    · A feeling that you did not finish having your bowel movement    · Nausea    · Bloating    · Headache  Seek immediate care for the following symptoms:   · Blood in your bowel movement    · A fever and abdominal pain with the constipation  Contact your healthcare provider if:   · Your constipation gets worse  · You start to vomit  · You have questions or concerns about your condition or care  Medicines:   · Medicine or a fiber supplement  may help make your bowel movement softer  A laxative may help relax and loosen your intestines to help you have a bowel movement  You may also be given medicine to increase fluid in your intestines  The fluid may help move bowel movements through your intestines  · Take your medicine as directed  Contact your healthcare provider if you think your medicine is not helping or if you have side effects  Tell him of her if you are allergic to any medicine  Keep a list of the medicines, vitamins, and herbs you take  Include the amounts, and when and why you take them  Bring the list or the pill bottles to follow-up visits  Carry your medicine list with you in case of an emergency  Manage or prevent constipation:   · Drink liquids as directed  You may need to drink extra liquids to help soften and move your bowels  Ask how much liquid to drink each day and which liquids are best for you  · Eat high-fiber foods  This may help decrease constipation by adding bulk to your bowel movements   High-fiber foods include fruit, vegetables, whole-grain breads and cereals, and beans  Your healthcare provider or dietitian can help you create a high-fiber meal plan  · Exercise regularly  Regular physical activity can help stimulate your intestines  Ask which exercises are best for you  · Schedule a time each day to have a bowel movement  This may help train your body to have regular bowel movements  Bend forward while you are on the toilet to help move the bowel movement out  Sit on the toilet for at least 10 minutes, even if you do not have a bowel movement  Follow up with your healthcare provider as directed:  Write down your questions so you remember to ask them during your visits  © 2017 [employer]  Information is for End User's use only and may not be sold, redistributed or otherwise used for commercial purposes  All illustrations and images included in CareNotes® are the copyrighted property of [employer]  or [employer]  The above information is an  only  It is not intended as medical advice for individual conditions or treatments  Talk to your doctor, nurse or pharmacist before following any medical regimen to see if it is safe and effective for you  Patient needs to take the colace 100mg twice daily every day Patient also needs to use the miralax 2 tablespoons in 8 oz water daily every day  Calorie Counting Diet   WHAT YOU NEED TO KNOW:   What is a calorie counting diet? It is a meal plan based on counting calories each day to reach a healthy body weight  You will need to eat fewer calories if you are trying to lose weight  Weight loss may decrease your risk for certain health problems or improve your health if you have health problems  Some of these health problems include heart disease, high blood pressure, and diabetes  What foods should I avoid? Your dietitian will tell you if you need to avoid certain foods based on your body weight and health condition   You may need to avoid high-fat foods if you are at risk for or have heart disease  You may need to eat fewer foods from the breads and starches food group if you have diabetes  How many calories are in foods? The following is a list of foods and drinks with the approximate number of calories in each  Check the food label to find the exact number of calories  A dietitian can tell you how many calories you should have from each food group each day    · Carbohydrate:      ¨ ½ of a 3-inch bagel, 1 slice of bread, or ½ of a hamburger bun or hot dog bun (80)    ¨ 1 (8-inch) flour tortilla or ½ cup of cooked rice (100)    ¨ 1 (6-inch) corn tortilla (80)    ¨ 1 (6-inch) pancake or 1 cup of bran flakes cereal (110)    ¨ ½ cup of cooked cereal (80)    ¨ ½ cup of cooked pasta (85)    ¨ 1 ounce of pretzels (100)    ¨ 3 cups of air-popped popcorn without butter or oil (80)    · Dairy:      ¨ 1 cup of skim or 1% milk (90)    ¨ 1 cup of 2% milk (120)    ¨ 1 cup of whole milk (160)    ¨ 1 cup of 2% chocolate milk (220)    ¨ 1 ounce of low-fat cheese with 3 grams of fat per ounce (70)    ¨ 1 ounce of cheddar cheese (114)    ¨ ½ cup of 1% fat cottage cheese (80)    ¨ 1 cup of plain or sugar-free, fat-free yogurt (90)    · Protein foods:      ¨ 3 ounces of fish (not breaded or fried) (95)    ¨ 3 ounces of breaded, fried fish (195)    ¨ ¾ cup of tuna canned in water (105)    ¨ 3 ounces of chicken breast without skin (105)    ¨ 1 fried chicken breast with skin (350)    ¨ ¼ cup of fat free egg substitute (40)    ¨ 1 large egg (75)    ¨ 3 ounces of lean beef or pork (165)    ¨ 3 ounces of fried pork chop or ham (185)    ¨ ½ cup of cooked dried beans, such as kidney, nowak, lentils, or navy (115)    ¨ 3 ounces of bologna or lunch meat (225)    ¨ 2 links of breakfast sausage (140)    · Vegetables:      ¨ ½ cup of sliced mushrooms (10)    ¨ 1 cup of salad greens, such as lettuce, spinach, or kervin (15)    ¨ ½ cup of steamed asparagus (20)    ¨ ½ cup of cooked summer squash, zucchini squash, or green or wax beans (25)    ¨ 1 cup of broccoli or cauliflower florets, or 1 medium tomato (25)    ¨ 1 large raw carrot or ½ cup of cooked carrots (40)    ¨ ? of a medium cucumber or 1 stalk of celery (5)    ¨ 1 small baked potato (160)    ¨ 1 cup of breaded, fried vegetables (230)    · Fruit:      ¨ 1 (6-inch) banana (55)     ¨ ½ of a 4-inch grapefruit (55)    ¨ 15 grapes (60)    ¨ 1 medium orange or apple (70)    ¨ 1 large peach (65)    ¨ 1 cup of fresh pineapple chunks (75)    ¨ 1 cup of melon cubes (50)    ¨ 1¼ cups of whole strawberries (45)    ¨ ½ cup of fruit canned in juice (55)    ¨ ½ cup of fruit canned in heavy syrup (110)    ¨ ?  cup of raisins (130)    ¨ ½ cup of unsweetened fruit juice (60)    ¨ ½ cup of grape, cranberry, or prune juice (90)    · Fat:      ¨ 10 peanuts or 2 teaspoons of peanut butter (55)    ¨ 2 tablespoons of avocado or 1 tablespoon of regular salad dressing (45)    ¨ 2 slices of euceda (90)    ¨ 1 teaspoon of oil, such as safflower, canola, corn, or olive oil (45)    ¨ 2 teaspoons of low-fat margarine, or 1 tablespoon of low-fat mayonnaise (50)    ¨ 1 teaspoon of regular margarine (40)    ¨ 1 tablespoon of regular mayonnaise (135)    ¨ 1 tablespoon of cream cheese or 2 tablespoons of low-fat cream cheese (45)    ¨ 2 tablespoons of vegetable shortening (215)    · Dessert and sweets:      ¨ 8 animal crackers or 5 vanilla wafers (80)    ¨ 1 frozen fruit juice bar (80)    ¨ ½ cup of ice milk or low-fat frozen yogurt (90)    ¨ ½ cup of sherbet or sorbet (125)    ¨ ½ cup of sugar-free pudding or custard (60)    ¨ ½ cup of ice cream (140)    ¨ ½ cup of pudding or custard (175)    ¨ 1 (2-inch) square chocolate brownie (185)    · Combination foods:      ¨ Bean burrito made with an 8-inch tortilla, without cheese (275)    ¨ Chicken breast sandwich with lettuce and tomato (325)    ¨ 1 cup of chicken noodle soup (60)    ¨ 1 beef taco (175)    ¨ Regular hamburger with lettuce and tomato (310)    ¨ Regular cheeseburger with lettuce and tomato (410)     ¨ ¼ of a 12-inch cheese pizza (280)    ¨ Fried fish sandwich with lettuce and tomato (425)    ¨ Hot dog and bun (275)    ¨ 1½ cups of macaroni and cheese (310)    ¨ Taco salad with a fried tortilla shell (870)    · Low-calorie foods:      ¨ 1 tablespoon of ketchup or 1 tablespoon of fat free sour cream (15)    ¨ 1 teaspoon of mustard (5)    ¨ ¼ cup of salsa (20)    ¨ 1 large dill pickle (15)    ¨ 1 tablespoon of fat free salad dressing (10)    ¨ 2 teaspoons of low-sugar, light jam or jelly, or 1 tablespoon of sugar-free syrup (15)    ¨ 1 sugar-free popsicle (15)    ¨ 1 cup of club soda, seltzer water, or diet soda (0)  CARE AGREEMENT:   You have the right to help plan your care  Discuss treatment options with your caregivers to decide what care you want to receive  You always have the right to refuse treatment  The above information is an  only  It is not intended as medical advice for individual conditions or treatments  Talk to your doctor, nurse or pharmacist before following any medical regimen to see if it is safe and effective for you  © 2017 [employer] Information is for End User's use only and may not be sold, redistributed or otherwise used for commercial purposes  All illustrations and images included in CareNotes® are the copyrighted property of [employer]  or [employer]ocha

## 2018-06-06 ENCOUNTER — ANNUAL EXAM (OUTPATIENT)
Dept: OBGYN CLINIC | Facility: CLINIC | Age: 43
End: 2018-06-06
Payer: COMMERCIAL

## 2018-06-06 VITALS — SYSTOLIC BLOOD PRESSURE: 112 MMHG | BODY MASS INDEX: 33.45 KG/M2 | WEIGHT: 213.6 LBS | DIASTOLIC BLOOD PRESSURE: 84 MMHG

## 2018-06-06 DIAGNOSIS — Z90.710 STATUS POST LAPAROSCOPIC HYSTERECTOMY: ICD-10-CM

## 2018-06-06 DIAGNOSIS — Z90.79 STATUS POST BILATERAL SALPINGECTOMY: ICD-10-CM

## 2018-06-06 DIAGNOSIS — M54.5 BILATERAL LOW BACK PAIN, UNSPECIFIED CHRONICITY, WITH SCIATICA PRESENCE UNSPECIFIED: Primary | ICD-10-CM

## 2018-06-06 DIAGNOSIS — E03.9 ACQUIRED HYPOTHYROIDISM: ICD-10-CM

## 2018-06-06 DIAGNOSIS — Z01.419 WOMEN'S ANNUAL ROUTINE GYNECOLOGICAL EXAMINATION: ICD-10-CM

## 2018-06-06 LAB
BILIRUB UR QL STRIP: NEGATIVE
CLARITY UR: CLEAR
CLARITY UR: CLEAR
COLOR UR: YELLOW
GLUCOSE UR STRIP-MCNC: NEGATIVE MG/DL
HGB UR QL STRIP.AUTO: NEGATIVE
KETONES UR STRIP-MCNC: NEGATIVE MG/DL
LEUKOCYTE ESTERASE UR QL STRIP: NEGATIVE
NITRITE UR QL STRIP: NEGATIVE
PH UR STRIP.AUTO: 6 [PH] (ref 4.5–8)
PROT UR STRIP-MCNC: NEGATIVE MG/DL
SL AMB  POCT GLUCOSE, UA: ABNORMAL
SL AMB LEUKOCYTE ESTERASE,UA: ABNORMAL
SL AMB POCT BILIRUBIN,UA: ABNORMAL
SL AMB POCT BLOOD,UA: ABNORMAL
SL AMB POCT COLOR,UA: YELLOW
SL AMB POCT KETONES,UA: ABNORMAL
SL AMB POCT NITRITE,UA: ABNORMAL
SL AMB POCT PH,UA: 6
SL AMB POCT SPECIFIC GRAVITY,UA: 1.02
SL AMB POCT URINE PROTEIN: ABNORMAL
SL AMB POCT UROBILINOGEN: 0.2
SP GR UR STRIP.AUTO: 1.02 (ref 1–1.03)
UROBILINOGEN UR QL STRIP.AUTO: 0.2 E.U./DL

## 2018-06-06 PROCEDURE — G0145 SCR C/V CYTO,THINLAYER,RESCR: HCPCS | Performed by: OBSTETRICS & GYNECOLOGY

## 2018-06-06 PROCEDURE — 99396 PREV VISIT EST AGE 40-64: CPT | Performed by: OBSTETRICS & GYNECOLOGY

## 2018-06-06 PROCEDURE — 81003 URINALYSIS AUTO W/O SCOPE: CPT | Performed by: OBSTETRICS & GYNECOLOGY

## 2018-06-06 NOTE — PROGRESS NOTES
Assessment   Annual well-woman exam  Status post total laparoscopic hysterectomy with bilateral salpingectomy  Low back pain         Plan   Screening mammography has been completed  Reviewed self-breast exam  No new gyn problems or concerns no further pelvic pain or bleeding symptoms   All questions answered  Await pap smear results  Subjective here for annual exam     Sandoval Chavis is a 40 y o  female who presents for annual exam   Status post total laparoscopic hysterectomy with bilateral salpingectomy in 2017 for pelvic pain symptoms  Since that time she is been much better she did have a recent pelvic ultrasound earlier this spring she has her ovaries which were in normal size and shape  No new pain symptoms no vaginal bleeding  The patient reports that there is not domestic violence in her life  Current contraception: status post hysterectomy  History of abnormal Pap smear: no  Family history of uterine or ovarian cancer: no  Regular self breast exam: yes  History of abnormal mammogram: no  Family history of breast cancer: no  History of abnormal lipids: no  Menstrual History:  OB History      Para Term  AB Living    3 0 0 0 0 3    SAB TAB Ectopic Multiple Live Births    0 0 0 0 0         Menarche age: 16  No LMP recorded (lmp unknown)  Patient has had a hysterectomy  Review of Systems  Pertinent items are noted in HPI        Objective no acute distress no pain     /84 (BP Location: Right arm, Patient Position: Sitting, Cuff Size: Standard)   Wt 96 9 kg (213 lb 9 6 oz)   LMP  (LMP Unknown)   BMI 33 45 kg/m²     General:   alert and oriented, in no acute distress, alert, appears stated age and cooperative   Heart: regular rate and rhythm, S1, S2 normal, no murmur, click, rub or gallop   Lungs: clear to auscultation bilaterally   Abdomen: soft, non-tender, without masses or organomegaly   Vulva: normal, Bartholin's, Urethra, Willis's normal, female escutcheon   Vagina: normal mucosa   Cervix: surgically absent   Uterus: surgically absent   Adnexa: normal adnexa and no mass, fullness, tenderness   Breast exam bilateral breast exam in the sitting supine position no visible or palpable breast masses or lesions identified no supraclavicular axillary lymphadenopathy noted no nipple discharge

## 2018-06-11 DIAGNOSIS — N76.0 BV (BACTERIAL VAGINOSIS): Primary | ICD-10-CM

## 2018-06-11 DIAGNOSIS — B96.89 BV (BACTERIAL VAGINOSIS): Primary | ICD-10-CM

## 2018-06-11 LAB
LAB AP GYN PRIMARY INTERPRETATION: NORMAL
Lab: NORMAL
PATH INTERP SPEC-IMP: NORMAL

## 2018-06-11 RX ORDER — METRONIDAZOLE 500 MG/1
500 TABLET ORAL EVERY 12 HOURS SCHEDULED
Qty: 14 TABLET | Refills: 0 | Status: SHIPPED | OUTPATIENT
Start: 2018-06-11 | End: 2018-06-18

## 2018-06-11 NOTE — PROGRESS NOTES
Please notify patient her Pap was normal however there was bacterial vaginosis identified  Prescription of metronidazole was sent to the pharmacy please take 1 twice daily for 1 week  Return for follow-up if still symptomatic in 1 week

## 2018-06-13 ENCOUNTER — APPOINTMENT (OUTPATIENT)
Dept: LAB | Facility: HOSPITAL | Age: 43
End: 2018-06-13
Payer: COMMERCIAL

## 2018-06-13 DIAGNOSIS — E03.9 ACQUIRED HYPOTHYROIDISM: ICD-10-CM

## 2018-06-13 LAB
T4 FREE SERPL-MCNC: 0.96 NG/DL (ref 0.76–1.46)
TSH SERPL DL<=0.05 MIU/L-ACNC: 3.69 UIU/ML (ref 0.36–3.74)

## 2018-06-13 PROCEDURE — 84443 ASSAY THYROID STIM HORMONE: CPT

## 2018-06-13 PROCEDURE — 36415 COLL VENOUS BLD VENIPUNCTURE: CPT

## 2018-06-13 PROCEDURE — 84439 ASSAY OF FREE THYROXINE: CPT

## 2018-06-21 DIAGNOSIS — R73.01 ELEVATED FASTING BLOOD SUGAR: ICD-10-CM

## 2018-06-21 DIAGNOSIS — E03.9 ACQUIRED HYPOTHYROIDISM: ICD-10-CM

## 2018-06-24 RX ORDER — LEVOTHYROXINE SODIUM 0.05 MG/1
50 TABLET ORAL DAILY
Qty: 30 TABLET | Refills: 2 | Status: SHIPPED | OUTPATIENT
Start: 2018-06-24 | End: 2018-11-08

## 2018-06-25 ENCOUNTER — OFFICE VISIT (OUTPATIENT)
Dept: FAMILY MEDICINE CLINIC | Facility: CLINIC | Age: 43
End: 2018-06-25
Payer: COMMERCIAL

## 2018-06-25 VITALS
DIASTOLIC BLOOD PRESSURE: 70 MMHG | BODY MASS INDEX: 33.65 KG/M2 | WEIGHT: 214.4 LBS | HEIGHT: 67 IN | TEMPERATURE: 97.1 F | SYSTOLIC BLOOD PRESSURE: 100 MMHG

## 2018-06-25 DIAGNOSIS — E03.9 ACQUIRED HYPOTHYROIDISM: ICD-10-CM

## 2018-06-25 DIAGNOSIS — R40.0 DAYTIME SOMNOLENCE: ICD-10-CM

## 2018-06-25 DIAGNOSIS — E03.9 ACQUIRED HYPOTHYROIDISM: Primary | ICD-10-CM

## 2018-06-25 DIAGNOSIS — K59.04 CHRONIC IDIOPATHIC CONSTIPATION: ICD-10-CM

## 2018-06-25 DIAGNOSIS — M54.50 ACUTE BILATERAL LOW BACK PAIN WITHOUT SCIATICA: ICD-10-CM

## 2018-06-25 PROBLEM — R10.9 ABDOMINAL PAIN: Status: RESOLVED | Noted: 2018-04-18 | Resolved: 2018-06-25

## 2018-06-25 PROBLEM — R10.30 LOWER ABDOMINAL PAIN: Status: RESOLVED | Noted: 2018-04-13 | Resolved: 2018-06-25

## 2018-06-25 PROCEDURE — 99214 OFFICE O/P EST MOD 30 MIN: CPT | Performed by: FAMILY MEDICINE

## 2018-06-25 NOTE — PROGRESS NOTES
Assessment/Plan:    No problem-specific Assessment & Plan notes found for this encounter  Diagnoses and all orders for this visit:    Acquired hypothyroidism    Acute bilateral low back pain without sciatica    Chronic idiopathic constipation    Daytime somnolence          Subjective:   Chief Complaint   Patient presents with    Hypothyroidism     checkup        Patient ID: Chrissy Gardiner is a 40 y o  female  Patient is here for follwoup of jody hypothyroidism, her low back pain, daytime somnolence  and her constipation Patient thyroid numbers were repeated and they were good Patient is unfortunately still having the low back pain Patient states taht if she bends a certain way she has 10 out of 10 pain Patient takes ibuprofen She has not had any physical therapy patient continues tihw the daytime tiredness and has appt with sleep doctor Patient is eating more frutis and vegetables ahd her constipation is resolved Patient has no other complaints at this time      Back Pain   This is a recurrent problem  The current episode started 1 to 4 weeks ago  The problem occurs 2 to 4 times per day  The problem is unchanged  The pain is present in the lumbar spine  The quality of the pain is described as aching  The pain does not radiate  The pain is at a severity of 6/10  The pain is moderate  The symptoms are aggravated by bending  Stiffness is present all day  Pertinent negatives include no abdominal pain, bladder incontinence, bowel incontinence, chest pain, dysuria, fever, headaches, leg pain, numbness, paresis, paresthesias, pelvic pain, perianal numbness, tingling, weakness or weight loss  Risk factors include lack of exercise and obesity  She has tried NSAIDs for the symptoms  The treatment provided moderate relief         The following portions of the patient's history were reviewed and updated as appropriate: allergies, current medications, past social history and problem list     Review of Systems Constitutional: Negative for fatigue, fever, unexpected weight change and weight loss  HENT: Negative for congestion, sinus pain and trouble swallowing  Eyes: Negative for discharge and visual disturbance  Respiratory: Negative for cough, chest tightness, shortness of breath and wheezing  Cardiovascular: Negative for chest pain, palpitations and leg swelling  Gastrointestinal: Negative for abdominal pain, blood in stool, bowel incontinence, constipation, diarrhea, nausea and vomiting  Genitourinary: Negative for bladder incontinence, difficulty urinating, dysuria, frequency, hematuria and pelvic pain  Musculoskeletal: Positive for back pain  Negative for arthralgias, gait problem and joint swelling  Skin: Negative for rash and wound  Allergic/Immunologic: Negative for environmental allergies and food allergies  Neurological: Negative for dizziness, tingling, syncope, weakness, numbness, headaches and paresthesias  Hematological: Negative for adenopathy  Does not bruise/bleed easily  Psychiatric/Behavioral: Positive for sleep disturbance  Negative for confusion and decreased concentration  The patient is not nervous/anxious  Objective:      /70   Temp (!) 97 1 °F (36 2 °C)   Ht 5' 7" (1 702 m)   Wt 97 3 kg (214 lb 6 4 oz)   LMP  (LMP Unknown)   BMI 33 58 kg/m²          Physical Exam   Constitutional: She is oriented to person, place, and time  She appears well-developed and well-nourished  HENT:   Head: Normocephalic and atraumatic  Right Ear: Hearing, tympanic membrane and external ear normal    Left Ear: Hearing, tympanic membrane and external ear normal    Eyes: Conjunctivae and EOM are normal  Pupils are equal, round, and reactive to light  Neck: Neck supple  No thyromegaly present  Cardiovascular: Normal rate and normal heart sounds  Pulmonary/Chest: Effort normal and breath sounds normal  She has no wheezes  She has no rales  Abdominal: Soft   Bowel sounds are normal  She exhibits no distension  There is no tenderness  Musculoskeletal: She exhibits tenderness  She exhibits no edema  Lumbar spine range of motion flexion is to 60 and extension to 10 sidebending and rotation are at 30 degrees b/l Leg strenthgt is 5/5 = bilaterally patient has 2/4 DTR's = b/l Patient has normal toe and heel walk   Lymphadenopathy:     She has no cervical adenopathy  Neurological: She is alert and oriented to person, place, and time  She has normal reflexes  No cranial nerve deficit  Coordination normal    Skin: Skin is warm and dry  No rash noted  Psychiatric: She has a normal mood and affect   Her behavior is normal  Judgment and thought content normal

## 2018-06-25 NOTE — PATIENT INSTRUCTIONS
Ejercicios para fortalecer los músculos del tronco   LO QUE NECESITA SABER:   ¿Qué necesito saber sobre los ejercicios para fortalecer los músculos del tronco?  El tronco incluye los músculos de la parte baja de la espalda, la cadera, la pelvis y el abdomen  Los ejercicios para fortalecer los músculos del tronco ayudan a sanar y fortalecer estos músculos  Altheimer ayuda a prevenir otra lesión y [place] pelvis, la columna vertebral y la cadera en la posición correcta  ¿Qué shereen saber acerca de hacer ejercicios de Burnice Justice? Consulte a bran médico antes de empezar un régimen de ejercicios  Un fisioterapeuta puede enseñarle a hacer ejercicios para fortalecer los músculos del tronco de forma holcomb  · [employer] ejercicios sobre dmitriy colchoneta o superficie firme  Dmitriy superficie sostendrá la columna vertebral y evitará el dolor de espalda  No mackenzie estos ejercicios en dmitriy cama  · Muévase lenta y suavemente  Evite movimientos rápidos o bruscos  · Deténgase si siente dolor  Los ejercicios para fortalecer los músculos del tronco no deben ser dolorosos  Deténgase si siente dolor  · Respire normalmente isamar los ejercicios  No contenga la respiración  Altheimer puede aumentar la presión arterial y evitar el fortalecimiento muscular  Bran médico le dirá cuándo inhalar y exhalar isamar el ejercicio  · Comience todos zachariah ejercicios con tonificación abdominal   La tonificación abdominal ayuda a calentar los músculos del tronco  Usted también puede practicar la tonificación o fortalecimiento abdominal isamar todo el día mientras está sentado o de pie  Acuéstese boca arriba con zachariah rodillas dobladas y zachariah pies planos sobre el piso  Coloque zachariah brazos en dmitriy posición relajada junto a bran cuerpo  Ponga tensos zachariah músculos abdominales  Contraiga el ombligo hacia adentro en dirección a la columna  Sostenga está posición por 5 segundos  Relaje zachariah músculos  Repita 10 veces         ¿Cómo realizo los ejercicios para fortalecer los músculos del tronco?  Bran médico le indicará con que frecuencia hacer estos ejercicios  También le dirá cuántas repeticiones de cada ejercicio debe hacer  Vanessa cada ejercicio isamar 5 segundos o según lo indicado  A medida que se fortalezca, aumente a 10 a 15 segundos  Puede hacer algunos de estos ejercicios sobre dmitriy pelota de estabilidad o con un peso  Pregunte a bran médico cómo utilizar dmitriy pelota de estabilidad o un peso para estos ejercicios:  · Mar lateral con pierna doblada:  Acuéstese de lado con zachariah piernas, caderas y hombros en línea recta  Apóyese en bran antebrazo a fin de que bran codo esté directamente debajo de bran hombro  Doble las rodillas hasta 90°  Levante las caderas del suelo  Balancéese sobre el antebrazo y sobre el lado de la rodilla  Mantenga esta posición  Repita en el otro lado  · Mar lateral con pierna estirada:  Acuéstese de lado con zachariah piernas, caderas y hombros en línea recta  Apóyese en bran antebrazo a fin de que bran codo esté directamente debajo de bran hombro  Puede apoyar la pierna de arriba frente a la pierna de abajo para el apoyo  Levante las caderas del suelo  Balancéese sobre el antebrazo y sobre la parte externa del pie flexionado  No permita que bran tobillo se doble de lado  Mantenga esta posición  Repita en el otro lado  · Superman:  Acuéstese boca abajo  Extienda los brazos hacia adelante en el piso  Apriete los músculos abdominales y levante la mano derecha y la pierna izquierda del suelo  Mantenga esta posición  Despacio regrese a la posición inicial  Apriete los músculos abdominales y levante la mano izquierda y la pierna derecha del suelo  Mantenga esta posición  Despacio regrese a la posición inicial            · Posición de acurrucarse:  Acuéstese boca arriba con zachariah rodillas dobladas y zachariah pies planos sobre el piso  Coloque las buffy con las gracy hacia abajo por debajo de la parte baja de bran espalda   Después, con los codos [employer] piso, levante los hombros y el pecho de 2 a 3 pulgadas del piso  Mantenga bran eve a la misma altura de deb hombros  Mantenga esta posición  Despacio regrese a la posición inicial            · Levantamiento de la pierna estirada:  Acuéstese boca arriba con dmitriy pierna estirada  Doble la otra rodilla y coloque el pie en posición plana sobre el piso  Ponga tensos deb músculos abdominales  Mantenga la pierna recta y levántela lentamente de 6 a 12 pulgadas del piso  Mantenga esta posición  Baje bran pierna lentamente  Vanessa tantas repeticiones talat le indicaron de rachid lado  Repita el ejercicio con la otra pierna  · Tablón:  Acuéstese boca abajo  Doble los codos y [employer] antebrazos en posición plana sobre el suelo  Levante bran pecho, el estómago y las rodillas del suelo  Asegúrese de [employer] codos estén por debajo de los hombros  Bran cuerpo debe estar en línea recta  No deje que las caderas o la parte baja de la espalda se hundan hacia el suelo  Contraiga los músculos abdominales y sostenga isamar 15 segundos  Para hacer rachid ejercicio más difícil, sostenga isamar 30 segundos o levante 1 pierna a la vez  · Bicicletas:  Acuéstese boca arriba  Doble ambas rodillas y llévelas hacia bran pecho  Deb pantorrillas deben estar paralelas al [employer]  [address] en la parte posterior de la eve  Estire la pierna derecha y manténgala levantada 2 pulgadas del piso  Levante la eve y los hombros del piso y gire hacia la izquierda  Mantenga la eve y los hombros levantados  Doble la rodilla derecha mientras endereza la pierna izquierda  Mantenga la pierna izquierda 2 pulgadas sobre el piso  Gire la eve y el pecho hacia la pierna izquierda  Siga enderezando 1 pierna a la vez y gire  ¿Cuándo shereen comunicarme con mi médico?   · Tiene dolor ariel o creciente cuando hace ejercico o está en reposo      · Usted tiene preguntas o inquietudes acerca de bran afección, cuidado o programa de ejercicios  ACUERDOS SOBRE LINARES CUIDADO:   Usted tiene el derecho de ayudar a planear linares cuidado  Aprenda todo lo que pueda sobre linares condición y talat darle tratamiento  Discuta deb opciones de tratamiento con deb médicos para decidir el cuidado que usted desea recibir  Usted siempre tiene el derecho de rechazar el tratamiento  Esta información es sólo para uso en educación  Linares intención no es darle un consejo médico sobre enfermedades o tratamientos  Colsulte con linares [employer] farmacéutico antes de seguir cualquier régimen médico para saber si es seguro y efectivo para usted  © 2017 [address] Information is for End User's use only and may not be sold, redistributed or otherwise used for commercial purposes  All illustrations and images included in CareNotes® are the copyrighted property of A D A M , Inc  or Markie Rocha  Hipotiroidismo   CUIDADO AMBULATORIO:   Hipotiroidismo  es dmitriy afección que se desarrolla cuando la glándula tiroides no produce suficiente hormona tiroidea  Las hormonas de la tiroides ayudan a controlar la temperatura del cuerpo, el ritmo cardíaco, el crecimiento, y peso corporal   Los signos y síntomas más comunes incluyen los siguientes:  Lo signos y síntomas pueden desarrollarse lentamente, a veces en el transcurso de varios años  · Agotamiento    · Sensibilidad al frío    · Miriam de eve o disminución en la capacidad de concentración    · Miriam o debilidad muscular    · Estreñimiento     · Piel seca y escamosa o uñas quebradizas    · Adelgazamiento del amanda    · Periodos menstruales pro o irregulares    · Depresión o irritabilidad  Llame al 911 en oscar de presentar lo siguiente:   · Usted tiene dolor de pecho repentino o falta de aire  · Usted sufre dmitriy convulsión  · Usted siente que se va a desmayar  Busque atención médica de inmediato si:   · Usted tiene diarrea, temblores o dificultad para dormir      · Deb piernas, tobillos o pies están inflamados  Pregúntele a dubois Jamal Gambler vitaminas y minerales son adecuados para usted  · Usted tiene fiebre  · Usted tiene escalofríos, tos o se siente débil y adolorido  · Usted tiene dolor e inflamación en los músculos y articulaciones  · Usted tiene comezón en la piel, inflamación o un sarpullido  · Deb signos y síntomas regresan o [place], aún después del [place]  · Usted tiene preguntas o inquietudes acerca de dubois condición o cuidado  Tratamiento:  El medicamento para reemplazar la hormona tiroidea puede devolver dubois nivel de hormona tiroidea a la normalidad  Solicite más información a dubois médico sobre otros medicamentos que usted podría necesitar  Acuda a deb consultas de control con dubois médico según le indicaron  Es probable que tenga que regresar para realizarse más exámenes de samuel y controlar deb niveles de la hormona tiroides  Los exámenes mostrarán si usted está recibiendo la cantidad correcta de medicamentos para la tiroides  Anote deb preguntas para que se acuerde de hacerlas isamar deb visitas  © 2017 [address] Information is for End User's use only and may not be sold, redistributed or otherwise used for commercial purposes  All illustrations and images included in CareNotes® are the copyrighted property of A D A M , Inc  or Markie Rocha  Esta información es sólo para uso en educación  Dubois intención no es darle un consejo médico sobre enfermedades o tratamientos  Colsulte con dubois [employer] farmacéutico antes de seguir cualquier régimen médico para saber si es seguro y efectivo para usted

## 2018-06-26 RX ORDER — LEVOTHYROXINE SODIUM 0.05 MG/1
50 TABLET ORAL DAILY
Qty: 30 TABLET | Refills: 2 | Status: SHIPPED | OUTPATIENT
Start: 2018-06-26 | End: 2019-07-29 | Stop reason: SDUPTHER

## 2018-07-02 ENCOUNTER — EVALUATION (OUTPATIENT)
Dept: PHYSICAL THERAPY | Facility: CLINIC | Age: 43
End: 2018-07-02
Payer: COMMERCIAL

## 2018-07-02 DIAGNOSIS — M54.50 ACUTE MIDLINE LOW BACK PAIN WITHOUT SCIATICA: Primary | ICD-10-CM

## 2018-07-02 PROCEDURE — G8990 OTHER PT/OT CURRENT STATUS: HCPCS | Performed by: PHYSICAL THERAPIST

## 2018-07-02 PROCEDURE — G8991 OTHER PT/OT GOAL STATUS: HCPCS | Performed by: PHYSICAL THERAPIST

## 2018-07-02 PROCEDURE — 97162 PT EVAL MOD COMPLEX 30 MIN: CPT | Performed by: PHYSICAL THERAPIST

## 2018-07-02 NOTE — PROGRESS NOTES
PT Evaluation     Today's date: 2018  Patient name: Erika Martinez  : 1975  MRN: [id number]  Referring provider: Nita Boeck, DO  Dx:   Encounter Diagnosis     ICD-10-CM    1  Acute midline low back pain without sciatica M54 5                   Assessment  Impairments: abnormal coordination, abnormal or restricted ROM, activity intolerance, impaired physical strength and pain with function    Assessment details: Pt is a pleasant 40 y o  female presenting to outpatient physical therapy with Acute midline low back pain without sciatica  (primary encounter diagnosis)   Pt presents with pain, decreased range of motion, decreased strength, and decreased tolerance to activity  Pt displays probable movement impairment diagnosis of hypersensitivity dysfunction due to chronic low back symptoms  Advised and educated patient in relaxation and breathing techniques  Pt would benefit from skilled physical therapy to address limitations and to achieve goals  Thank you for this referral    Understanding of Dx/Px/POC: good   Prognosis: good    Goals  ST  Patient will report 25% decrease in pain in 4 weeks  2  Patient will demonstrate 25% improvement in ROM in 4 weeks  3  Patient will demonstrate 1/2 grade improvement in strength in 4 weeks  LT  Patient will be able to perform IADLS without restriction or pain by discharge  2  Patient will be independent in HEP by discharge  3  Patient will be able to return to recreational/work duties without restriction or pain by discharge        Plan  Patient would benefit from: PT eval and skilled PT  Planned modality interventions: cryotherapy and thermotherapy: hydrocollator packs  Planned therapy interventions: IADL retraining, body mechanics training, flexibility, functional ROM exercises, home exercise program, neuromuscular re-education, manual therapy, postural training, strengthening, stretching, therapeutic activities and therapeutic exercise  Frequency: 2x week  Duration in visits: 8  Duration in weeks: 4        Subjective Evaluation    History of Present Illness  Mechanism of injury: Pt reports chronic history of low back pain, stating 3-year history, attributing symptoms to caring for dependent father  Denies diagnostic imaging of low back  Denies radiating symptoms or paresthesias  Denies bowel or bladder dysfunction  Denies fever, chills, nausea, or vomiting  Pain  Current pain ratin  At best pain ratin  At worst pain rating: 10 (>10/10)  Location: B PSIS  Quality: pressure, burning and cramping  Relieving factors: heat and medications          Objective     Special Questions  Negative for bladder dysfunction and bowel dysfunction    Palpation   Left   Tenderness of the erector spinae, lumbar paraspinals and quadratus lumborum  Right Tenderness of the erector spinae, lumbar paraspinals and quadratus lumborum  Additional Palpation Details  At IE: Tenderness to light touch palpation    Tenderness     Lumbar Spine  Tenderness in the spinous process  Left Hip   Tenderness in the PSIS, greater trochanter and sacroiliac joint  Right Hip   Tenderness in the PSIS, greater trochanter and sacroiliac joint       Additional Tenderness Details  At IE: Tenderness to light touch palpation    Active Range of Motion     Lumbar   Flexion: 70 degrees with pain  Extension: 20 degrees with pain  Left lateral flexion: 35 degrees with pain  Right lateral flexion: 40 degrees with pain  Left rotation: 35 degrees with pain  Right rotation: 20 degrees with pain    Additional Active Range of Motion Details  LUMBAR AROM -  Flexion, extension, bilat lateral flexion measured with bubble inclinometer at L1; Rotation measured with goniometer and patient seated      Passive Range of Motion   Left Hip   Flexion: with pain  External rotation (prone): 27 degrees with pain  Internal rotation (prone): 26 and prone pain degrees with pain    Right Hip Flexion: with pain  External rotation (prone): -5 degrees with pain  Internal rotation (prone): 25 degrees with pain    Strength/Myotome Testing     Left Hip   Planes of Motion   Flexion: 4-    Right Hip   Planes of Motion   Flexion: 4-    Left Knee   Flexion: 4-  Extension: 4-    Right Knee   Flexion: 4-  Extension: 4-    Left Ankle/Foot   Dorsiflexion: 4-  Great toe extension: 4-    Right Ankle/Foot   Dorsiflexion: 4-  Great toe extension: 4-    Tests     Lumbar     Left   Positive passive SLR  Right   Positive passive SLR  Left Hip   SLR: Positive  Right Hip   SLR: Positive       Additional Tests Details  At IE: PA lumbar mobility testing unable to be performed to due high levels of pain reported with light touch over spinous processes of L3-5  At IE : Passive SLR R 40 deg, L 40 deg; pain bilaterally      Flowsheet Rows      Most Recent Value   PT/OT G-Codes   Current Score  52   Projected Score  67   Assessment Type  Evaluation   G code set  Other PT/OT Primary   Other PT Primary Current Status ()  CK   Other PT Primary Goal Status ()  CJ          Precautions: n/a    Daily Treatment Diary     Manual                                                                                   Exercise Diary              H/L diaphragmatic breathing (DB)             DB w/march             DB w/alt UE lift                          LTR             BKFO                          Seated lumbar roll outs             Seated H/S str                                                                                                                                                                Modalities

## 2018-07-09 ENCOUNTER — OFFICE VISIT (OUTPATIENT)
Dept: PHYSICAL THERAPY | Facility: CLINIC | Age: 43
End: 2018-07-09
Payer: COMMERCIAL

## 2018-07-09 DIAGNOSIS — M54.50 ACUTE MIDLINE LOW BACK PAIN WITHOUT SCIATICA: Primary | ICD-10-CM

## 2018-07-09 PROCEDURE — 97110 THERAPEUTIC EXERCISES: CPT

## 2018-07-09 NOTE — PROGRESS NOTES
Daily Note     Today's date: 2018  Patient name: Derrek Henry  : 1975  MRN: [id number]  Referring provider: Li Almanzar DO  Dx:   Encounter Diagnosis     ICD-10-CM    1  Acute midline low back pain without sciatica M54 5                   Subjective: Pt reports with c/o moderate pain in lower back graded 4/10  She noted no increased pain or adverse reactions after IE  Objective: See treatment diary below  Precautions: n/a    Daily Treatment Diary     Manual                                                                                   Exercise Diary              H/L diaphragmatic breathing (DB) 1 min            DB w/march 1 min            DB w/alt UE lift 1 min                         LTR 15"x5            BKFO x10 ea                         Seated lumbar roll outs 15"x5            Seated H/S str 15"x5                                                                                                                                                               Modalities              MHP pre seated 10'                                          Assessment: Tolerated treatment well  She denied increased pain with addition of exercises today  Issued/reviewed HEP with no questions or concerns  Patient would benefit from continued PT    Plan: Continue per plan of care

## 2018-07-12 ENCOUNTER — OFFICE VISIT (OUTPATIENT)
Dept: PHYSICAL THERAPY | Facility: CLINIC | Age: 43
End: 2018-07-12
Payer: COMMERCIAL

## 2018-07-12 DIAGNOSIS — M54.50 ACUTE MIDLINE LOW BACK PAIN WITHOUT SCIATICA: Primary | ICD-10-CM

## 2018-07-12 PROCEDURE — 97150 GROUP THERAPEUTIC PROCEDURES: CPT

## 2018-07-12 PROCEDURE — 97110 THERAPEUTIC EXERCISES: CPT

## 2018-07-12 NOTE — PROGRESS NOTES
Daily Note     Today's date: 2018  Patient name: Elizabeth Pretty  : 1975  MRN: [id number]  Referring provider: Juani Pike DO  Dx:   Encounter Diagnosis     ICD-10-CM    1  Acute midline low back pain without sciatica M54 5                   Subjective: Pt reports with c/o increased LBP which she attributes to RTW today  She stated having to lift heavy 50 pound boxes  Pain currently graded 8/10  Objective: See treatment diary below  Precautions: n/a    Daily Treatment Diary     Manual                                                                                   Exercise Diary             H/L diaphragmatic breathing (DB) 1 min 1 min           DB w/march 1 min 1 min           DB w/alt UE lift 1 min 1 min                        LTR 15"x5 15"x5           BKFO x10 ea 20 ea                        Seated lumbar roll outs 15"x5 15"x5           Seated H/S str 15"x5 15"x5                                                                                                                                                              Modalities             MHP pre seated 10' 10'                                         Assessment: Tolerated treatment well  She denied increased pain with addition of exercises today  Issued/reviewed HEP with no questions or concerns  Patient would benefit from continued PT    Plan: Continue per plan of care

## 2018-07-16 ENCOUNTER — OFFICE VISIT (OUTPATIENT)
Dept: PHYSICAL THERAPY | Facility: CLINIC | Age: 43
End: 2018-07-16
Payer: COMMERCIAL

## 2018-07-16 DIAGNOSIS — M54.50 ACUTE MIDLINE LOW BACK PAIN WITHOUT SCIATICA: Primary | ICD-10-CM

## 2018-07-16 PROCEDURE — 97110 THERAPEUTIC EXERCISES: CPT

## 2018-07-16 NOTE — PROGRESS NOTES
Daily Note     Today's date: 2018  Patient name: Frankey Bogus  : 1975  MRN: [id number]  Referring provider: Chaz Terry DO  Dx:   Encounter Diagnosis     ICD-10-CM    1  Acute midline low back pain without sciatica M54 5                   Subjective: Pt reports less lower back pain graded 6/10 pre-tx  She noted she continues with increased pain in LB with work activities 2* standing for 10 hrs  She noted feeling better after last visit  Objective: See treatment diary below  Precautions: n/a    Daily Treatment Diary     Manual                                                                                   Exercise Diary            H/L diaphragmatic breathing (DB) 1 min 1 min 1 min          DB w/march 1 min 1 min 1 min          DB w/alt UE lift 1 min 1 min 1 min                       LTR 15"x5 15"x5 15"x5          BKFO x10 ea 20 ea 20 ea                       Seated lumbar roll outs 15"x5 15"x5 15"x5          Seated H/S str 15"x5 15"x5 15"x5                                                                                                                                                             Modalities            MHP pre seated 10' 10' 10'                                        Assessment: Tolerated treatment well  She continues to respond well with exercises, but reported pain unchanged post treatment  Patient would benefit from continued PT  Plan: Continue per plan of care

## 2018-07-19 ENCOUNTER — OFFICE VISIT (OUTPATIENT)
Dept: PHYSICAL THERAPY | Facility: CLINIC | Age: 43
End: 2018-07-19
Payer: COMMERCIAL

## 2018-07-19 DIAGNOSIS — M54.50 ACUTE MIDLINE LOW BACK PAIN WITHOUT SCIATICA: Primary | ICD-10-CM

## 2018-07-19 PROCEDURE — 97150 GROUP THERAPEUTIC PROCEDURES: CPT | Performed by: PHYSICAL THERAPIST

## 2018-07-19 PROCEDURE — 97112 NEUROMUSCULAR REEDUCATION: CPT | Performed by: PHYSICAL THERAPIST

## 2018-07-19 NOTE — PROGRESS NOTES
Daily Note     Today's date: 2018  Patient name: Rekha Tejada  : 1975  MRN: [id number]  Referring provider: Gerald Juarez DO  Dx:   Encounter Diagnosis     ICD-10-CM    1  Acute midline low back pain without sciatica M54 5                   Subjective: Pt reports she has seen improvements in back pain since starting therapy, however, notes she continues to have pain  Objective: See treatment diary below  Precautions: n/a    Daily Treatment Diary     Manual                                                                                   Exercise Diary           H/L diaphragmatic breathing (DB) 1 min 1 min 1 min D/C         DB w/march 1 min 1 min 1 min 2 min         DB w/alt UE lift 1 min 1 min 1 min 2 min                      LTR 15"x5 15"x5 15"x5 15"x5         BKFO x10 ea 20 ea 20 ea 20 ea                      Seated lumbar roll outs 15"x5 15"x5 15"x5 15"x5         Seated H/S str 15"x5 15"x5 15"x5 15"x5                      Quadruped alt UE    3"x10 ea         Hydrants    3"x10                                                                                                                     Modalities           MHP pre seated 10' 10' 10' 10'                                       Assessment: Tolerated treatment well  Demonstrates challenge with DLS exercises, however, appeared motivated to complete reps as prescribed  Patient would benefit from continued PT  Plan: Continue per plan of care

## 2018-07-23 PROBLEM — E66.811 CLASS 1 OBESITY: Status: ACTIVE | Noted: 2018-07-23

## 2018-07-23 PROBLEM — E66.9 CLASS 1 OBESITY: Status: ACTIVE | Noted: 2018-07-23

## 2018-07-30 ENCOUNTER — TELEPHONE (OUTPATIENT)
Dept: FAMILY MEDICINE CLINIC | Facility: CLINIC | Age: 43
End: 2018-07-30

## 2018-07-30 ENCOUNTER — APPOINTMENT (OUTPATIENT)
Dept: PHYSICAL THERAPY | Facility: CLINIC | Age: 43
End: 2018-07-30
Payer: COMMERCIAL

## 2018-07-30 DIAGNOSIS — M54.50 ACUTE MIDLINE LOW BACK PAIN WITHOUT SCIATICA: Primary | ICD-10-CM

## 2018-08-01 ENCOUNTER — TELEPHONE (OUTPATIENT)
Dept: FAMILY MEDICINE CLINIC | Facility: CLINIC | Age: 43
End: 2018-08-01

## 2018-08-02 ENCOUNTER — APPOINTMENT (OUTPATIENT)
Dept: LAB | Facility: HOSPITAL | Age: 43
End: 2018-08-02
Payer: COMMERCIAL

## 2018-08-02 DIAGNOSIS — Z11.1 SCREENING FOR TUBERCULOSIS: ICD-10-CM

## 2018-08-02 DIAGNOSIS — Z11.1 SCREENING FOR TUBERCULOSIS: Primary | ICD-10-CM

## 2018-08-02 PROCEDURE — 86480 TB TEST CELL IMMUN MEASURE: CPT

## 2018-08-02 PROCEDURE — 36415 COLL VENOUS BLD VENIPUNCTURE: CPT

## 2018-08-05 LAB
ANNOTATION COMMENT IMP: ABNORMAL
GAMMA INTERFERON BACKGROUND BLD IA-ACNC: 0.1 IU/ML
M TB IFN-G BLD-IMP: ABNORMAL
M TB IFN-G CD4+ BCKGRND COR BLD-ACNC: 0.96 IU/ML
M TB IFN-G CD4+ T-CELLS BLD-ACNC: 1.06 IU/ML
MITOGEN IGNF BLD-ACNC: 8.61 IU/ML
QUANTIFERON-TB GOLD IN TUBE: NORMAL
SERVICE CMNT-IMP: ABNORMAL

## 2018-08-06 ENCOUNTER — TELEPHONE (OUTPATIENT)
Dept: FAMILY MEDICINE CLINIC | Facility: CLINIC | Age: 43
End: 2018-08-06

## 2018-08-06 DIAGNOSIS — R76.12 POSITIVE QUANTIFERON-TB GOLD TEST: Primary | ICD-10-CM

## 2018-08-06 NOTE — TELEPHONE ENCOUNTER
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

## 2018-08-07 ENCOUNTER — HOSPITAL ENCOUNTER (OUTPATIENT)
Dept: RADIOLOGY | Facility: HOSPITAL | Age: 43
Discharge: HOME | End: 2018-08-07
Payer: COMMERCIAL

## 2018-08-07 DIAGNOSIS — R76.12 POSITIVE QUANTIFERON-TB GOLD TEST: ICD-10-CM

## 2018-08-07 PROCEDURE — 71046 X-RAY EXAM CHEST 2 VIEWS: CPT

## 2018-08-27 ENCOUNTER — TELEPHONE (OUTPATIENT)
Dept: OBGYN CLINIC | Facility: CLINIC | Age: 43
End: 2018-08-27

## 2018-08-27 DIAGNOSIS — B35.4 TINEA CORPORIS: Primary | ICD-10-CM

## 2018-08-27 RX ORDER — CLOTRIMAZOLE AND BETAMETHASONE DIPROPIONATE 10; .64 MG/G; MG/G
CREAM TOPICAL 2 TIMES DAILY
Qty: 30 G | Refills: 0 | Status: SHIPPED | OUTPATIENT
Start: 2018-08-27 | End: 2018-11-08

## 2018-09-10 ENCOUNTER — HOSPITAL ENCOUNTER (EMERGENCY)
Facility: HOSPITAL | Age: 43
Discharge: HOME | End: 2018-09-10
Attending: EMERGENCY MEDICINE | Admitting: EMERGENCY MEDICINE
Payer: OTHER MISCELLANEOUS

## 2018-09-10 ENCOUNTER — APPOINTMENT (EMERGENCY)
Dept: RADIOLOGY | Facility: HOSPITAL | Age: 43
End: 2018-09-10
Payer: OTHER MISCELLANEOUS

## 2018-09-10 VITALS
RESPIRATION RATE: 18 BRPM | HEART RATE: 81 BPM | SYSTOLIC BLOOD PRESSURE: 125 MMHG | OXYGEN SATURATION: 100 % | BODY MASS INDEX: 34.47 KG/M2 | DIASTOLIC BLOOD PRESSURE: 79 MMHG | WEIGHT: 220.06 LBS | TEMPERATURE: 97.5 F

## 2018-09-10 DIAGNOSIS — S69.91XA FINGER INJURY, RIGHT, INITIAL ENCOUNTER: Primary | ICD-10-CM

## 2018-09-10 PROCEDURE — 99283 EMERGENCY DEPT VISIT LOW MDM: CPT

## 2018-09-10 PROCEDURE — 73140 X-RAY EXAM OF FINGER(S): CPT

## 2018-09-10 RX ORDER — IBUPROFEN 400 MG/1
400 TABLET ORAL ONCE
Status: COMPLETED | OUTPATIENT
Start: 2018-09-10 | End: 2018-09-10

## 2018-09-10 RX ORDER — IBUPROFEN 400 MG/1
TABLET ORAL
Status: COMPLETED
Start: 2018-09-10 | End: 2018-09-10

## 2018-09-10 RX ADMIN — IBUPROFEN 400 MG: 400 TABLET ORAL at 17:32

## 2018-09-10 NOTE — ED PROVIDER NOTES
History  Chief Complaint   Patient presents with    Finger Injury     This is a 50-year-old female patient was  a box in a pincer maneuver while doing this she felt the pain at her PIP it sharp and now she has difficulty extending her finger and is somewhat in extension  There is no numbness or tingling she has tried nothing for the pain  It happened and she immediately came in  Nothing makes it better or worse she nothing over-the-counter  High suspicion for partial tendon rupture  Patient radiograph to rule out fracture  Prior to Admission Medications   Prescriptions Last Dose Informant Patient Reported? Taking?    clotrimazole-betamethasone (LOTRISONE) 1-0 05 % cream   No No   Sig: Apply topically 2 (two) times a day   docusate sodium (COLACE) 100 mg capsule   No No   Sig: Take 1 capsule (100 mg total) by mouth 2 (two) times a day   ibuprofen (MOTRIN) 600 mg tablet   No No   Si tablet every 8 hours for 10 days then as needed for low back pain   levothyroxine 50 mcg tablet   No No   Sig: TAKE 1 TABLET (50 MCG TOTAL) BY MOUTH DAILY FOR 30 DAYS   levothyroxine 50 mcg tablet   No No   Sig: TAKE 1 TABLET (50 MCG TOTAL) BY MOUTH DAILY FOR 30 DAYS   metFORMIN (GLUCOPHAGE) 500 mg tablet   No No   Sig: TAKE 1 TABLET (500 MG TOTAL) BY MOUTH 2 (TWO) TIMES A DAY WITH MEALS FOR 30 DAYS   polyethylene glycol (GLYCOLAX) powder   No No   Si tablespoons in 8 oz water daily   traMADol (ULTRAM) 50 mg tablet   No No   Sig: Take 1-2 tablets ( mg total) by mouth every 6 (six) hours as needed for moderate pain      Facility-Administered Medications: None       Past Medical History:   Diagnosis Date    Abdominal pain     Amenorrhea     Bacterial vaginosis     Breast pain     Daytime somnolence     Diarrhea     Disease of thyroid gland     Dysmenorrhea     Dysuria     Fibroids     Heavy menses     Hyperglycemia     Irregular menses     Low back pain     Menometrorrhagia     Nausea & vomiting     Obesity     Oligomenorrhea     Right shoulder pain     Uterine fibroid     Wears glasses        Past Surgical History:   Procedure Laterality Date    ABDOMINAL ADHESION SURGERY N/A 5/19/2017    Procedure: LYSIS ADHESIONS;  Surgeon: Anthony Fink DO;  [facility];  Service:     CYSTOSCOPY N/A 5/19/2017    Procedure: Eva Brazen;  Surgeon: Anthony Fink DO;  [facility];  Service:    Dois Moose      TX COLONOSCOPY FLX DX W/COLLJ SPEC WHEN PFRMD N/A 4/24/2018    Procedure: COLONOSCOPY;  Surgeon: Phyllis Ospina MD;  [facility]; Service: General    TX LAPAROSCOPY W TOT HYSTERECTUTERUS <=250 GRAM  W TUBE/OVARY N/A 5/19/2017    Procedure: HYSTERECTOMY LAPAROSCOPIC TOTAL ,BILATERAL SALPINGECTOMY;  Surgeon: Anthony Fink DO;  [facility];  Service: Gynecology       Family History   Problem Relation Age of Onset    Diabetes Mother     Hypertension Mother     Hyperlipidemia Mother     No Known Problems Father     No Known Problems Brother      I have reviewed and agree with the history as documented  Social History   Substance Use Topics    Smoking status: Never Smoker    Smokeless tobacco: Never Used    Alcohol use No      Comment: social        Review of Systems   All other systems reviewed and are negative  Physical Exam  Physical Exam   Constitutional: She appears well-developed and well-nourished  HENT:   Head: Normocephalic and atraumatic  Right Ear: External ear normal    Left Ear: External ear normal    Nose: Nose normal    Mouth/Throat: Oropharynx is clear and moist    Eyes: Conjunctivae are normal  Pupils are equal, round, and reactive to light  Neck: Normal range of motion  Neck supple  Cardiovascular: Normal rate and regular rhythm  Pulmonary/Chest: Effort normal and breath sounds normal    Abdominal: Soft  Bowel sounds are normal  There is no tenderness  Musculoskeletal:        Hands:  Neurological: She is alert  Skin: Skin is warm  Psychiatric: She has a normal mood and affect  Her behavior is normal    Nursing note and vitals reviewed  Vital Signs  ED Triage Vitals   Temperature Pulse Respirations Blood Pressure SpO2   09/10/18 1652 09/10/18 1652 09/10/18 1652 09/10/18 1652 09/10/18 1652   97 5 °F (36 4 °C) 81 18 125/79 100 %      Temp Source Heart Rate Source Patient Position - Orthostatic VS BP Location FiO2 (%)   09/10/18 1652 09/10/18 1652 -- 09/10/18 1652 --   Temporal Monitor  Left arm       Pain Score       09/10/18 1704       5           Vitals:    09/10/18 1652   BP: 125/79   Pulse: 81       Visual Acuity      ED Medications  Medications   ibuprofen (MOTRIN) tablet 400 mg (not administered)       Diagnostic Studies  Results Reviewed     None                 XR finger second digit-index RIGHT   ED Interpretation by Alistair Smith PA-C (09/10 1719)   Soft tissue swelling at 2nd PIP no fracture       by Prince Lopez (09/10 1707)                 Procedures  Procedures       Phone Contacts  ED Phone Contact    ED Course                               MDM  CritCare Time    Disposition  Final diagnoses:   Finger injury, right, initial encounter     Time reflects when diagnosis was documented in both MDM as applicable and the Disposition within this note     Time User Action Codes Description Comment    9/10/2018  5:22 PM All 107 Finger injury, right, initial encounter       ED Disposition     ED Disposition Condition Comment    Discharge  Raisa Vigil discharge to home/self care      Condition at discharge: Good        Follow-up Information     Follow up With Specialties Details Why Kirsten Dee MD Orthopedic Surgery Schedule an appointment as soon as possible for a visit  [address]  [phone]            Patient's Medications   Discharge Prescriptions    DICLOFENAC SODIUM (VOLTAREN) 50 MG EC TABLET    Take 1 tablet (50 mg total) by mouth 2 (two) times a day       Start Date: 9/10/2018 End Date: --       Order Dose: 50 mg       Quantity: 10 tablet    Refills: 0     No discharge procedures on file      ED Provider  Electronically Signed by           Maxwell Roca PA-C  09/10/18 6591

## 2018-09-10 NOTE — DISCHARGE INSTRUCTIONS
Finger Sprain   WHAT YOU NEED TO KNOW:   A finger sprain happens when ligaments in your finger or thumb are stretched or torn  Ligaments are the tough tissues that connect bones  Ligaments allow your hands to grasp and pinch  DISCHARGE INSTRUCTIONS:   Return to the emergency department if:   · The skin on your injured finger looks bluish or pale (less color than normal)  · You have new weakness or numbness in your finger or thumb  It may tingle or burn  · You have a splint that you cannot adjust and it feels too tight  Contact your healthcare provider if:   · You have new or increased swelling or pain in your finger  · You have new or increased stiffness when you move your injured finger  · You have questions or concerns about your injury or treatment  Medicines:   · Pain medicine  may be given  Do not wait until the pain is severe before taking your medicine  · Take your medicine as directed  Call your healthcare provider if you think your medicines are not helping or if you have side effects  Tell him if you take vitamins, herbs, or any other medicines  Keep a written list of your medicines  Include the amounts, and when and why you take them  Bring the list or the pill bottles to follow-up visits  Care for your finger:   · Rest  your finger for at least 48 hours  Do not do activities that cause pain  Return to normal activities as directed  · Apply ice  on your finger to help decrease pain and swelling  Put crushed ice in a plastic bag and cover it with a towel  Put the ice on your injured finger or thumb every hour for 15 to 20 minutes at a time  You may need to ice the area at least 4 to 8 times each day  Ice your finger for as many days as directed  · Elevate your finger  above the level of your heart as often as you can  This will help decrease swelling and pain  You can elevate your hand by resting it on a pillow  · Use a splint or compression as directed    Compression (tight hold) helps support your finger or thumb as it heals  Tape your injured finger to the finger beside it  Severe sprains may be treated with a splint  A splint prevents your finger from moving while it heals  Ask how long you must wear the splint or tape, and how to apply them  · Do exercises as directed  You may be given gentle exercises to begin in a few days  Exercises can help decrease stiffness in your finger or thumb  Exercises also help decrease pain and swelling and improve the movement of your finger or thumb  Check with your healthcare provider before you return to your normal activities or sports  Follow up with your healthcare provider as directed:  Write down any questions you may have to ask at your follow up visits  © 2017 [facility] Information is for End User's use only and may not be sold, redistributed or otherwise used for commercial purposes  All illustrations and images included in CareNotes® are the copyrighted property of A D A M , Inc  or Markie Rocha  The above information is an  only  It is not intended as medical advice for individual conditions or treatments  Talk to your doctor, nurse or pharmacist before following any medical regimen to see if it is safe and effective for you  Tendon Rupture   WHAT YOU NEED TO KNOW:   A tendon rupture is a partial or complete tear of your tendon  Tendons are tough bands of tissue that attach your muscles to your bones  A tear may be caused by an injury or increased pressure on the tendon that occurs during sports or a fall  Your risk may be higher if you have a weak tendon  Weak tendons may be caused by tendonitis, use of steroids, older age, and chronic conditions such as arthritis  DISCHARGE INSTRUCTIONS:   Seek care immediately if:   · You have severe pain in the injured area, even after you take medicine  · Your arm or leg feels warm, tender, and painful   It may look swollen and red     · You feel lightheaded, short of breath, and have chest pain  · You cough up blood  Contact your healthcare provider if:   · Your symptoms do not get better with treatment  · You feel another pop, snap, or crack in your tendon  · You have questions or concerns about your condition or care  Medicines:   · NSAIDs , such as ibuprofen, help decrease swelling, pain, and fever  This medicine is available with or without a doctor's order  NSAIDs can cause stomach bleeding or kidney problems in certain people  If you take blood thinner medicine, always ask your healthcare provider if NSAIDs are safe for you  Always read the medicine label and follow directions  · Acetaminophen  decreases pain  It is available without a doctor's order  Ask how much to take and how often to take it  Follow directions  Acetaminophen can cause liver damage if not taken correctly  · Take your medicine as directed  Contact your healthcare provider if you think your medicine is not helping or if you have side effects  Tell him or her if you are allergic to any medicine  Keep a list of the medicines, vitamins, and herbs you take  Include the amounts, and when and why you take them  Bring the list or the pill bottles to follow-up visits  Carry your medicine list with you in case of an emergency  Follow up with your healthcare provider as directed:  Write down your questions so you remember to ask them during your visits  Self-care:   · Rest  the injured tendon until pain and swelling have decreased  Ask your healthcare provider what activities you can do while your tendon heals  · Apply ice  on your tendon for 15 to 20 minutes every hour for 48 hours or as directed  Use an ice pack, or put crushed ice in a plastic bag  Cover it with a towel  Ice helps prevent tissue damage and decreases swelling and pain  · Compress  the injury with an elastic bandage, air cast, medical boot, or splint to reduce swelling   Ask your healthcare provider which compression device to use, and how tight it should be  · Elevate  the injured area above the level of your heart as often as you can  This will help decrease swelling and pain  If possible, prop the injured area on pillows or blankets to keep it elevated comfortably  Wear support devices as directed  You may need a brace, cast, or splint to limit movement and protect your tendon  If the tendon rupture is in your leg, you may need to use crutches  This will decrease pain as you move around  Go to physical therapy as directed:  A physical therapist teaches you exercises to help improve movement and strength  © 2017 [address] [facility] Information is for End User's use only and may not be sold, redistributed or otherwise used for commercial purposes  All illustrations and images included in CareNotes® are the copyrighted property of [employer]  or Markie Rocha  The above information is an  only  It is not intended as medical advice for individual conditions or treatments  Talk to your doctor, nurse or pharmacist before following any medical regimen to see if it is safe and effective for you

## 2018-09-18 DIAGNOSIS — R73.01 ELEVATED FASTING BLOOD SUGAR: ICD-10-CM

## 2018-09-19 NOTE — PROGRESS NOTES
Assessment/Plan:    Diagnoses and all orders for this visit:    Crushing injury of right index finger, initial encounter     Patient wishes to return to working continue as she has been doing since her injury  I have recommended liliana taping may have given her some Coban and liliana-taped her in the office today  Will see her back in approximately 2 weeks at that point time I do believe that she will be close to being pain free and with possible discharge  Patient to perform a trial full duty  Return in about 2 weeks (around 10/4/2018)  Chief Complaint:   right finger injury    Subjective:   Patient ID: Luisito Grace is a 40 y o  female  NP presents for [employer] WC injury on 9/10 sustained a crush injury to the right index finger between a heavy box and conveyor belt  ER eval and Xray wnl  She had swelling and bruising of PIP joint  Patient has been able to perform her part time job since the injury  Review of Systems   Constitutional: Negative  HENT: Negative  Eyes: Negative  Respiratory: Negative  Cardiovascular: Negative  Gastrointestinal: Negative  Endocrine: Negative  Genitourinary: Negative  Musculoskeletal: Positive for arthralgias and joint swelling  Skin: Negative  Neurological: Negative  Psychiatric/Behavioral: Negative  The following portions of the patient's chart were reviewed and updated as appropriate:    Allergy:  No Known Allergies      Past Medical History:   Diagnosis Date    Abdominal pain     Amenorrhea     Bacterial vaginosis     Breast pain     Daytime somnolence     Diarrhea     Disease of thyroid gland     Dysmenorrhea     Dysuria     Fibroids     Heavy menses     Hyperglycemia     Irregular menses     Low back pain     Menometrorrhagia     Nausea & vomiting     Obesity     Oligomenorrhea     Right shoulder pain     Uterine fibroid     Wears glasses        Past Surgical History:   Procedure Laterality Date    ABDOMINAL ADHESION SURGERY N/A 5/19/2017    Procedure: LYSIS ADHESIONS;  Surgeon: Leslie Damon DO;  [facility];  Service:     CYSTOSCOPY N/A 5/19/2017    Procedure: Amaury Garcia;  Surgeon: Leslie Damon DO;  [facility];  Service:    Jose Luis Muhammad VT COLONOSCOPY FLX DX W/COLLJ SPEC WHEN PFRMD N/A 4/24/2018    Procedure: COLONOSCOPY;  Surgeon: Joseph Wu MD;  [facility]; Service: General    VT LAPAROSCOPY W TOT HYSTERECTUTERUS <=250 GRAM  W TUBE/OVARY N/A 5/19/2017    Procedure: HYSTERECTOMY LAPAROSCOPIC TOTAL ,BILATERAL SALPINGECTOMY;  Surgeon: Leslie Damon DO;  [facility];  Service: Gynecology       Social History     Social History    Marital status: /Civil Union     Spouse name: N/A    Number of children: N/A    Years of education: N/A     Occupational History    Not on file       Social History Main Topics    Smoking status: Never Smoker    Smokeless tobacco: Never Used    Alcohol use No      Comment: social    Drug use: No    Sexual activity: Yes     Partners: Male     Other Topics Concern    Not on file     Social History Narrative    Always uses a seatbelt    Caffeine use    Three children       Family History   Problem Relation Age of Onset    Diabetes Mother     Hypertension Mother     Hyperlipidemia Mother     No Known Problems Father     No Known Problems Brother        Medications:    Current Outpatient Prescriptions:     clotrimazole-betamethasone (LOTRISONE) 1-0 05 % cream, Apply topically 2 (two) times a day, Disp: 30 g, Rfl: 0    diclofenac sodium (VOLTAREN) 50 mg EC tablet, Take 1 tablet (50 mg total) by mouth 2 (two) times a day, Disp: 10 tablet, Rfl: 0    docusate sodium (COLACE) 100 mg capsule, Take 1 capsule (100 mg total) by mouth 2 (two) times a day, Disp: 60 capsule, Rfl: 3    ibuprofen (MOTRIN) 600 mg tablet, 1 tablet every 8 hours for 10 days then as needed for low back pain, Disp: 60 tablet, Rfl: 0    levothyroxine 50 mcg tablet, TAKE 1 TABLET (50 MCG TOTAL) BY MOUTH DAILY FOR 30 DAYS, Disp: 30 tablet, Rfl: 2    levothyroxine 50 mcg tablet, TAKE 1 TABLET (50 MCG TOTAL) BY MOUTH DAILY FOR 30 DAYS, Disp: 30 tablet, Rfl: 2    metFORMIN (GLUCOPHAGE) 500 mg tablet, TAKE 1 TABLET (500 MG TOTAL) BY MOUTH 2 (TWO) TIMES A DAY WITH MEALS FOR 30 DAYS, Disp: 60 tablet, Rfl: 2    polyethylene glycol (GLYCOLAX) powder, 2 tablespoons in 8 oz water daily, Disp: 850 g, Rfl: 1    traMADol (ULTRAM) 50 mg tablet, Take 1-2 tablets ( mg total) by mouth every 6 (six) hours as needed for moderate pain, Disp: 40 tablet, Rfl: 0    Patient Active Problem List   Diagnosis    Acquired hypothyroidism    Hyperglycemia    Daytime somnolence    Chronic idiopathic constipation    Obstructive sleep apnea    Acute bilateral low back pain without sciatica    Class 1 obesity       Objective:  Right Hand Exam     Other   Erythema: absent  Sensation: normal    Comments:  Right index finger with mild swelling of the dorsal aspect of the PIP  There is no instability on exam   There is decreased range of motion due to pain at approximately 90° of flexion but full extension  Physical Exam   Constitutional: She is oriented to person, place, and time  She appears well-developed and well-nourished  HENT:   Head: Normocephalic and atraumatic  Eyes: Conjunctivae are normal    Neck: Neck supple  Cardiovascular: Intact distal pulses  Pulmonary/Chest: Effort normal    Neurological: She is alert and oriented to person, place, and time  Skin: Skin is warm and dry  Psychiatric: She has a normal mood and affect  Her behavior is normal    Vitals reviewed  Neurologic Exam     Mental Status   Oriented to person, place, and time  Procedures    I have personally reviewed the written report and images of the pertinent studies     Finger Sharmaine covarrubiasl

## 2018-09-20 ENCOUNTER — OFFICE VISIT (OUTPATIENT)
Dept: OBGYN CLINIC | Facility: MEDICAL CENTER | Age: 43
End: 2018-09-20
Payer: OTHER MISCELLANEOUS

## 2018-09-20 VITALS
HEART RATE: 66 BPM | BODY MASS INDEX: 35.32 KG/M2 | HEIGHT: 66 IN | SYSTOLIC BLOOD PRESSURE: 115 MMHG | WEIGHT: 219.8 LBS | DIASTOLIC BLOOD PRESSURE: 77 MMHG

## 2018-09-20 DIAGNOSIS — S67.190A CRUSHING INJURY OF RIGHT INDEX FINGER, INITIAL ENCOUNTER: Primary | ICD-10-CM

## 2018-09-20 PROCEDURE — 99203 OFFICE O/P NEW LOW 30 MIN: CPT | Performed by: EMERGENCY MEDICINE

## 2018-09-20 NOTE — LETTER
September 20, 2018     Patient: Sid Felix   YOB: 1975   Date of Visit: 9/20/2018       To Whom it May Concern:    Sid Felix is under my professional care  She was seen in my office on 9/20/2018  She may return to a trial of full duty  If you have any questions or concerns, please don't hesitate to call           Sincerely,          Alfonzo Mclaughlin MD        CC: No Recipients

## 2018-09-24 ENCOUNTER — APPOINTMENT (OUTPATIENT)
Dept: RADIOLOGY | Facility: CLINIC | Age: 43
End: 2018-09-24
Payer: COMMERCIAL

## 2018-09-24 ENCOUNTER — OFFICE VISIT (OUTPATIENT)
Dept: OBGYN CLINIC | Facility: MEDICAL CENTER | Age: 43
End: 2018-09-24
Payer: COMMERCIAL

## 2018-09-24 VITALS
HEIGHT: 66 IN | SYSTOLIC BLOOD PRESSURE: 117 MMHG | BODY MASS INDEX: 35.2 KG/M2 | DIASTOLIC BLOOD PRESSURE: 79 MMHG | WEIGHT: 219 LBS | HEART RATE: 66 BPM

## 2018-09-24 DIAGNOSIS — M25.551 BILATERAL HIP PAIN: ICD-10-CM

## 2018-09-24 DIAGNOSIS — M54.50 ACUTE MIDLINE LOW BACK PAIN WITHOUT SCIATICA: ICD-10-CM

## 2018-09-24 DIAGNOSIS — M54.5 CHRONIC LOW BACK PAIN, UNSPECIFIED BACK PAIN LATERALITY, WITH SCIATICA PRESENCE UNSPECIFIED: Primary | ICD-10-CM

## 2018-09-24 DIAGNOSIS — G89.29 CHRONIC LOW BACK PAIN, UNSPECIFIED BACK PAIN LATERALITY, WITH SCIATICA PRESENCE UNSPECIFIED: Primary | ICD-10-CM

## 2018-09-24 DIAGNOSIS — M54.5 CHRONIC LOW BACK PAIN, UNSPECIFIED BACK PAIN LATERALITY, WITH SCIATICA PRESENCE UNSPECIFIED: ICD-10-CM

## 2018-09-24 DIAGNOSIS — M25.552 BILATERAL HIP PAIN: ICD-10-CM

## 2018-09-24 DIAGNOSIS — G89.29 CHRONIC LOW BACK PAIN, UNSPECIFIED BACK PAIN LATERALITY, WITH SCIATICA PRESENCE UNSPECIFIED: ICD-10-CM

## 2018-09-24 PROCEDURE — 99214 OFFICE O/P EST MOD 30 MIN: CPT | Performed by: FAMILY MEDICINE

## 2018-09-24 PROCEDURE — 72100 X-RAY EXAM L-S SPINE 2/3 VWS: CPT

## 2018-09-24 PROCEDURE — 73521 X-RAY EXAM HIPS BI 2 VIEWS: CPT

## 2018-09-24 RX ORDER — METHYLPREDNISOLONE 4 MG/1
TABLET ORAL
Qty: 21 TABLET | Refills: 0 | Status: SHIPPED | OUTPATIENT
Start: 2018-09-24 | End: 2018-11-08

## 2018-09-24 NOTE — PROGRESS NOTES
1  Chronic low back pain, unspecified back pain laterality, with sciatica presence unspecified  XR spine lumbar 2 or 3 views injury    Ambulatory referral to Pain Management    MRI lumbar spine wo contrast    Methylprednisolone 4 MG TBPK   2  Acute midline low back pain without sciatica  Ambulatory referral to Sports Medicine   3  Bilateral hip pain  XR hips bilateral 2 vw w pelvis if performed     Orders Placed This Encounter   Procedures    XR hips bilateral 2 vw w pelvis if performed    XR spine lumbar 2 or 3 views injury    MRI lumbar spine wo contrast    Ambulatory referral to Pain Management        Imaging Studies (I personally reviewed images in PACS and report):   x-ray lumbar vertebra 09/24/2018: no acute osseous abnormality  Mild to moderate  Degenerative disc disease   x-ray AP pelvis 09/24/2018:  No acute osseous abnormality  Mild osteoarthritis hips  IMPRESSION:   axial back pain  Failed physical therapy ordered by primary care physician July 2018      Return for   Follow-up with Pain Management  Patient Instructions   Back pain can often be caused by a spectrum of issues ranging from back strain which is a muscle tear in the back and heals with time to spasm associated with chronic back arthritis (degenerative disc disease) to Sciatica which is radiation of pain down the leg with or without numbness and tingling that is caused by a pinch of a nerve in the spine from bony arthritis or herniated disc or pinching of a nerve due to buttock muscle spasm known as piriformis syndrome  Mainstay of treatment is physical therapy coupled with pharmacologic management of pain  Bed rest is no longer recommended as it results in stiffness and delayed recovery  Physical therapy to toleration at first is recommended and proceeded by progression to strengthening  Drugs often used to treat back pain are anti-inflammatories (NSAIDs), Tylenol (acetaminophen), muscle relaxers   In recent years, study have shown that Tylenol does not offer much relief however it is used along with nsaids for increased relief especially when patients cannot take other medicines such as muscle relaxers  Occassionally, for refractory pain, steroid packs such as Medrol are used but studies show that these medications do not offer long term relief  Opiods/pain killers (Percocet, oxycodone, hydrocodone) carry a significant addictive and dependence risk and as such are used only in cases of severe pain and per the CDC limited to a few days of use to avoid dependence  Mris are reserved for patients with red flags including fevers, chills, unintentional weight loss, dropped foot or severe weakness, bowel or bladder incontinence which are signs of medical emergencies including cancer, infection, severely pinched nerve, and a medical complication known as cauda equina syndrome  If you ever have any of these symptoms then please notify physician immediately and present to the ER  If you continue to have symptoms then we will pursue MRI of your back after failing 4 weeks of physical therapy  Unless there are red flags as above, most insurances do not cover MRIs to be performed until after a trial of physical therapy  This is because most back pain resolves within 1-3 months and rarely requires invasive intervention  If back pain fails to improve with conservative measures (therapy, medicine, time) then I will order MRI and if appropriate refer to pain management to consider injections and other invasive intervention  Surgery is rarely warranted early on in back pain unless there is a significant red flag  CHIEF COMPLAINT:  Back pain    HPI:  Frankey Bogus is a 40 y o  female  who presents for       Visit  09/24/2018:   Evaluation of low back pain ongoing for approximately 4-5 years  Patient denies any injury  Patient points to low back as source of intermittent pain  She denies any pain radiating down her legs  She states the pain does radiate occasionally upper  Spine to thoracic region and bilateral shoulders posteriorly  She denies any numbness or tingling  Cramping pressure-like spasm  Moderate intensity  Patient was seen by primary care physician 06/25/2018 for back pain with physical therapy ordered  Patient did attend physical therapy during July 2018 approximately 5 sessions without any relief  Patient takes ibuprofen 600 mg with only minimal relief  Review of Systems   Constitutional: Negative for chills, fever and unexpected weight change  HENT: Negative for hearing loss, nosebleeds and sore throat  Eyes: Negative for pain, redness and visual disturbance  Respiratory: Negative for cough, shortness of breath and wheezing  Cardiovascular: Negative for chest pain, palpitations and leg swelling  Gastrointestinal: Negative for abdominal distention, nausea and vomiting  Endocrine: Negative for polydipsia and polyuria  Genitourinary: Negative for dysuria and hematuria  Skin: Negative for rash and wound  Neurological: Negative for dizziness, numbness and headaches  Psychiatric/Behavioral: Negative for decreased concentration and suicidal ideas           Following history reviewed and update:    Past Medical History:   Diagnosis Date    Abdominal pain     Amenorrhea     Bacterial vaginosis     Breast pain     Daytime somnolence     Diarrhea     Disease of thyroid gland     Dysmenorrhea     Dysuria     Fibroids     Heavy menses     Hyperglycemia     Irregular menses     Low back pain     Menometrorrhagia     Nausea & vomiting     Obesity     Oligomenorrhea     Right shoulder pain     Uterine fibroid     Wears glasses      Past Surgical History:   Procedure Laterality Date    ABDOMINAL ADHESION SURGERY N/A 5/19/2017    Procedure: LYSIS ADHESIONS;  Surgeon: Ruel Haji DO;  [facility];  Service:     CYSTOSCOPY N/A 5/19/2017    Procedure: CYSTOSCOPY;  Surgeon: Callie Peters DO;  [facility];  Service:     CYSTOSCOPY      MA COLONOSCOPY FLX DX W/COLLJ SPEC WHEN PFRMD N/A 4/24/2018    Procedure: COLONOSCOPY;  Surgeon: Gordy Horn MD;  [facility]; Service: General    MA LAPAROSCOPY W TOT HYSTERECTUTERUS <=250 GRAM  W TUBE/OVARY N/A 5/19/2017    Procedure: HYSTERECTOMY LAPAROSCOPIC TOTAL ,BILATERAL SALPINGECTOMY;  Surgeon: Callie Peters DO;  [facility];  Service: Gynecology     Social History   History   Alcohol Use No     Comment: social     History   Drug Use No     History   Smoking Status    Never Smoker   Smokeless Tobacco    Never Used     Family History   Problem Relation Age of Onset    Diabetes Mother     Hypertension Mother     Hyperlipidemia Mother     No Known Problems Father     No Known Problems Brother      No Known Allergies       Physical Exam  Constitutional:  see vital signs  Gen: well-developed, normocephalic/atraumatic, well-groomed  Eyes: No inflammation or discharge of conjunctiva or lids; sclera clear   Pharynx: no inflammation, lesion, or mass of lips  Neck: supple, no masses, non-distended  MSK: no inflammation, lesion, mass, or clubbing of nails and digits except for other than mentioned below  SKIN: no visible rashes or skin lesions  Pulmonary/Chest: Effort normal  No respiratory distress     NEURO: cranial nerves grossly intact  PSYCH:  Alert and oriented to person, place, and time; recent and remote memory intact; mood normal, no depression, anxiety, or agitation, judgment and insight good and intact     Ortho Exam  BACK EXAM:  Gait: normal, no trendelenberg gait, no antalgic gait    BACK TENDERNESS:  Spinous Processes: +  T12-L1 through L4  Paraspinal Muscles: + bilateral lower lumbar  SI Joint: no  Sacrum: no    ROM:  Flexion: intact  Extension: intact  Sidebending: intact    DERMATOMAL SENSATION:  L1: normal   L2: normal   L3: normal   L4: normal   L5: normal   S1: normal    STRENGTH (bilateral):  Knee Extension: 5/5  Knee Flexion: 5/5  Foot Dorsiflexion: 5/5  Great Toe Extension: 5/5  Foot Plantarflexion: 5/5  Hip Flexion: 5/5  Hip Abduction: 5/5    REFLEXES:  Patellar: 2+ bilateral  Achilles: 2+ bilateral  Clonus: negative bilateral    BACK:   SUPINE STRAIGHT LEG: negative  PRONE STRAIGHT LEG:  SLUMP: negative    HIP:  LOG ROLL: negative  CYNTHIA: negative  FADIR: negative      Procedures

## 2018-09-24 NOTE — PATIENT INSTRUCTIONS
Back pain can often be caused by a spectrum of issues ranging from back strain which is a muscle tear in the back and heals with time to spasm associated with chronic back arthritis (degenerative disc disease) to Sciatica which is radiation of pain down the leg with or without numbness and tingling that is caused by a pinch of a nerve in the spine from bony arthritis or herniated disc or pinching of a nerve due to buttock muscle spasm known as piriformis syndrome  Mainstay of treatment is physical therapy coupled with pharmacologic management of pain  Bed rest is no longer recommended as it results in stiffness and delayed recovery  Physical therapy to toleration at first is recommended and proceeded by progression to strengthening  Drugs often used to treat back pain are anti-inflammatories (NSAIDs), Tylenol (acetaminophen), muscle relaxers  In recent years, study have shown that Tylenol does not offer much relief however it is used along with nsaids for increased relief especially when patients cannot take other medicines such as muscle relaxers  Occassionally, for refractory pain, steroid packs such as Medrol are used but studies show that these medications do not offer long term relief  Opiods/pain killers (Percocet, oxycodone, hydrocodone) carry a significant addictive and dependence risk and as such are used only in cases of severe pain and per the CDC limited to a few days of use to avoid dependence  Mris are reserved for patients with red flags including fevers, chills, unintentional weight loss, dropped foot or severe weakness, bowel or bladder incontinence which are signs of medical emergencies including cancer, infection, severely pinched nerve, and a medical complication known as cauda equina syndrome  If you ever have any of these symptoms then please notify physician immediately and present to the ER    If you continue to have symptoms then we will pursue MRI of your back after failing 4 weeks of physical therapy  Unless there are red flags as above, most insurances do not cover MRIs to be performed until after a trial of physical therapy  This is because most back pain resolves within 1-3 months and rarely requires invasive intervention  If back pain fails to improve with conservative measures (therapy, medicine, time) then I will order MRI and if appropriate refer to pain management to consider injections and other invasive intervention  Surgery is rarely warranted early on in back pain unless there is a significant red flag

## 2018-09-29 ENCOUNTER — APPOINTMENT (OUTPATIENT)
Dept: LAB | Facility: HOSPITAL | Age: 43
End: 2018-09-29
Payer: COMMERCIAL

## 2018-09-29 DIAGNOSIS — E03.9 ACQUIRED HYPOTHYROIDISM: ICD-10-CM

## 2018-09-29 LAB
T4 FREE SERPL-MCNC: 1.2 NG/DL (ref 0.76–1.46)
TSH SERPL DL<=0.05 MIU/L-ACNC: 3.56 UIU/ML (ref 0.36–3.74)

## 2018-09-29 PROCEDURE — 36415 COLL VENOUS BLD VENIPUNCTURE: CPT

## 2018-09-29 PROCEDURE — 84443 ASSAY THYROID STIM HORMONE: CPT

## 2018-09-29 PROCEDURE — 84439 ASSAY OF FREE THYROXINE: CPT

## 2018-10-02 ENCOUNTER — HOSPITAL ENCOUNTER (OUTPATIENT)
Dept: MRI IMAGING | Facility: HOSPITAL | Age: 43
Discharge: HOME | End: 2018-10-02
Attending: FAMILY MEDICINE
Payer: COMMERCIAL

## 2018-10-02 DIAGNOSIS — G89.29 CHRONIC LOW BACK PAIN, UNSPECIFIED BACK PAIN LATERALITY, WITH SCIATICA PRESENCE UNSPECIFIED: ICD-10-CM

## 2018-10-02 DIAGNOSIS — M54.5 CHRONIC LOW BACK PAIN, UNSPECIFIED BACK PAIN LATERALITY, WITH SCIATICA PRESENCE UNSPECIFIED: ICD-10-CM

## 2018-10-02 PROCEDURE — 72148 MRI LUMBAR SPINE W/O DYE: CPT

## 2018-10-05 ENCOUNTER — OFFICE VISIT (OUTPATIENT)
Dept: OBGYN CLINIC | Facility: MEDICAL CENTER | Age: 43
End: 2018-10-05
Payer: OTHER MISCELLANEOUS

## 2018-10-05 VITALS
HEIGHT: 66 IN | DIASTOLIC BLOOD PRESSURE: 74 MMHG | HEART RATE: 59 BPM | SYSTOLIC BLOOD PRESSURE: 111 MMHG | BODY MASS INDEX: 35.35 KG/M2

## 2018-10-05 DIAGNOSIS — S67.190D CRUSHING INJURY OF RIGHT INDEX FINGER, SUBSEQUENT ENCOUNTER: Primary | ICD-10-CM

## 2018-10-05 PROCEDURE — 99213 OFFICE O/P EST LOW 20 MIN: CPT | Performed by: EMERGENCY MEDICINE

## 2018-10-05 NOTE — LETTER
October 5, 2018     Patient: Jewel Hensley   YOB: 1975   Date of Visit: 10/5/2018       To Whom it May Concern:    Jewel Hensley is under my professional care  She was seen in my office on 10/5/2018  She may continue to perform full duty with no restrictions  Please allow her to wrap or buddy tape finger as needed for support  If you have any questions or concerns, please don't hesitate to call           Sincerely,          Amelie Peterson MD        CC: No Recipients

## 2018-10-05 NOTE — PROGRESS NOTES
Assessment/Plan:    Diagnoses and all orders for this visit:    Crushing injury of right index finger, subsequent encounter      Continue full duty    Return in about 2 weeks (around 10/19/2018)  Chief Complaint:   F/U finger injury    Subjective:   Patient ID: Zaira Gross is a 40 y o  female  Patient returns stating that she is doing much improved he is tolerating full duty  She is much less pain  Initial note:  NP presents for [employer] WC injury on 9/10 sustained a crush injury to the right index finger between a heavy box and conveyor belt  ER eval and Xray wnl  She had swelling and bruising of PIP joint  Patient has been able to perform her part time job since the injury  Review of Systems    The following portions of the patient's chart were reviewed and updated as appropriate: Allergy:  No Known Allergies      Past Medical History:   Diagnosis Date    Abdominal pain     Amenorrhea     Bacterial vaginosis     Breast pain     Daytime somnolence     Diarrhea     Disease of thyroid gland     Dysmenorrhea     Dysuria     Fibroids     Heavy menses     Hyperglycemia     Irregular menses     Low back pain     Menometrorrhagia     Nausea & vomiting     Obesity     Oligomenorrhea     Right shoulder pain     Uterine fibroid     Wears glasses        Past Surgical History:   Procedure Laterality Date    ABDOMINAL ADHESION SURGERY N/A 5/19/2017    Procedure: LYSIS ADHESIONS;  Surgeon: Bob Rosenbaum DO;  [facility];  Service:     CYSTOSCOPY N/A 5/19/2017    Procedure: [place];  Surgeon: Bob Rosenbaum DO;  [facility];  Service:    Keren Hannah      HI COLONOSCOPY FLX DX W/COLLJ SPEC WHEN PFRMD N/A 4/24/2018    Procedure: COLONOSCOPY;  Surgeon: Jayda Wagoner MD;  [facility];   Service: General    HI LAPAROSCOPY W TOT HYSTERECTUTERUS <=250 GRAM  W TUBE/OVARY N/A 5/19/2017    Procedure: HYSTERECTOMY LAPAROSCOPIC TOTAL ,BILATERAL SALPINGECTOMY;  Surgeon: Мария Ramirez DO;  [facility];  Service: Gynecology       Social History     Social History    Marital status: /Civil Union     Spouse name: N/A    Number of children: N/A    Years of education: N/A     Occupational History    Not on file       Social History Main Topics    Smoking status: Never Smoker    Smokeless tobacco: Never Used    Alcohol use No      Comment: social    Drug use: No    Sexual activity: Yes     Partners: Male     Other Topics Concern    Not on file     Social History Narrative    Always uses a seatbelt    Caffeine use    Three children       Family History   Problem Relation Age of Onset    Diabetes Mother     Hypertension Mother     Hyperlipidemia Mother     No Known Problems Father     No Known Problems Brother        Medications:    Current Outpatient Prescriptions:     clotrimazole-betamethasone (LOTRISONE) 1-0 05 % cream, Apply topically 2 (two) times a day, Disp: 30 g, Rfl: 0    diclofenac sodium (VOLTAREN) 50 mg EC tablet, Take 1 tablet (50 mg total) by mouth 2 (two) times a day, Disp: 10 tablet, Rfl: 0    docusate sodium (COLACE) 100 mg capsule, Take 1 capsule (100 mg total) by mouth 2 (two) times a day, Disp: 60 capsule, Rfl: 3    ibuprofen (MOTRIN) 600 mg tablet, 1 tablet every 8 hours for 10 days then as needed for low back pain, Disp: 60 tablet, Rfl: 0    levothyroxine 50 mcg tablet, TAKE 1 TABLET (50 MCG TOTAL) BY MOUTH DAILY FOR 30 DAYS, Disp: 30 tablet, Rfl: 2    levothyroxine 50 mcg tablet, TAKE 1 TABLET (50 MCG TOTAL) BY MOUTH DAILY FOR 30 DAYS, Disp: 30 tablet, Rfl: 2    metFORMIN (GLUCOPHAGE) 500 mg tablet, TAKE 1 TABLET (500 MG TOTAL) BY MOUTH 2 (TWO) TIMES A DAY WITH MEALS FOR 30 DAYS, Disp: 60 tablet, Rfl: 2    Methylprednisolone 4 MG TBPK, Use as directed on package, Disp: 21 tablet, Rfl: 0    polyethylene glycol (GLYCOLAX) powder, 2 tablespoons in 8 oz water daily, Disp: 850 g, Rfl: 1    traMADol (ULTRAM) 50 mg tablet, Take 1-2 tablets ( mg total) by mouth every 6 (six) hours as needed for moderate pain, Disp: 40 tablet, Rfl: 0    Patient Active Problem List   Diagnosis    Acquired hypothyroidism    Hyperglycemia    Daytime somnolence    Chronic idiopathic constipation    Obstructive sleep apnea    Acute bilateral low back pain without sciatica    Class 1 obesity       Objective:  Right Hand Exam     Comments:    Right index finger normal appearance no swelling no signs of infection skin is intact  There is mild tenderness of the PIP joint with no signs of instability on exam   She has full active range of motion and strength  Physical Exam      Neurologic Exam    Procedures    I have personally reviewed the written report of the pertinent studies

## 2018-10-30 ENCOUNTER — HOSPITAL ENCOUNTER (EMERGENCY)
Facility: HOSPITAL | Age: 43
Discharge: HOME | End: 2018-10-30
Attending: EMERGENCY MEDICINE | Admitting: EMERGENCY MEDICINE
Payer: COMMERCIAL

## 2018-10-30 VITALS
TEMPERATURE: 97.4 F | RESPIRATION RATE: 20 BRPM | HEART RATE: 72 BPM | BODY MASS INDEX: 34.78 KG/M2 | SYSTOLIC BLOOD PRESSURE: 114 MMHG | DIASTOLIC BLOOD PRESSURE: 68 MMHG | HEIGHT: 66 IN | WEIGHT: 216.4 LBS | OXYGEN SATURATION: 100 %

## 2018-10-30 DIAGNOSIS — K59.04 CHRONIC IDIOPATHIC CONSTIPATION: ICD-10-CM

## 2018-10-30 DIAGNOSIS — R11.2 NAUSEA VOMITING AND DIARRHEA: ICD-10-CM

## 2018-10-30 DIAGNOSIS — R19.7 NAUSEA VOMITING AND DIARRHEA: ICD-10-CM

## 2018-10-30 DIAGNOSIS — B34.9 VIRAL SYNDROME: Primary | ICD-10-CM

## 2018-10-30 LAB
ALBUMIN SERPL BCP-MCNC: 4.2 G/DL (ref 3–5.2)
ALP SERPL-CCNC: 82 U/L (ref 43–122)
ALT SERPL W P-5'-P-CCNC: 77 U/L (ref 9–52)
ANION GAP SERPL CALCULATED.3IONS-SCNC: 5 MMOL/L (ref 5–14)
AST SERPL W P-5'-P-CCNC: 64 U/L (ref 14–36)
BACTERIA UR QL AUTO: ABNORMAL /HPF
BASOPHILS # BLD AUTO: 0.1 THOUSANDS/ΜL (ref 0–0.1)
BASOPHILS NFR BLD AUTO: 1 % (ref 0–1)
BILIRUB SERPL-MCNC: 0.3 MG/DL
BILIRUB UR QL STRIP: NEGATIVE
BUN SERPL-MCNC: 14 MG/DL (ref 5–25)
CALCIUM SERPL-MCNC: 9.1 MG/DL (ref 8.4–10.2)
CHLORIDE SERPL-SCNC: 102 MMOL/L (ref 97–108)
CLARITY UR: CLEAR
CO2 SERPL-SCNC: 30 MMOL/L (ref 22–30)
COLOR UR: YELLOW
CREAT SERPL-MCNC: 1.02 MG/DL (ref 0.6–1.2)
EOSINOPHIL # BLD AUTO: 0.1 THOUSAND/ΜL (ref 0–0.4)
EOSINOPHIL NFR BLD AUTO: 1 % (ref 0–6)
ERYTHROCYTE [DISTWIDTH] IN BLOOD BY AUTOMATED COUNT: 12.9 %
GFR SERPL CREATININE-BSD FRML MDRD: 68 ML/MIN/1.73SQ M
GLUCOSE SERPL-MCNC: 94 MG/DL (ref 70–99)
GLUCOSE UR STRIP-MCNC: NEGATIVE MG/DL
HCT VFR BLD AUTO: 39.6 % (ref 36–46)
HGB BLD-MCNC: 13.4 G/DL (ref 12–16)
HGB UR QL STRIP.AUTO: 10
KETONES UR STRIP-MCNC: NEGATIVE MG/DL
LEUKOCYTE ESTERASE UR QL STRIP: NEGATIVE
LYMPHOCYTES # BLD AUTO: 2.4 THOUSANDS/ΜL (ref 0.5–4)
LYMPHOCYTES NFR BLD AUTO: 28 % (ref 20–50)
MAGNESIUM SERPL-MCNC: 1.8 MG/DL (ref 1.6–2.3)
MCH RBC QN AUTO: 33.2 PG (ref 26–34)
MCHC RBC AUTO-ENTMCNC: 34 G/DL (ref 31–36)
MCV RBC AUTO: 98 FL (ref 80–100)
MONOCYTES # BLD AUTO: 0.6 THOUSAND/ΜL (ref 0.2–0.9)
MONOCYTES NFR BLD AUTO: 7 % (ref 1–10)
NEUTROPHILS # BLD AUTO: 5.4 THOUSANDS/ΜL (ref 1.8–7.8)
NEUTS SEG NFR BLD AUTO: 63 % (ref 45–65)
NITRITE UR QL STRIP: NEGATIVE
NON-SQ EPI CELLS URNS QL MICRO: ABNORMAL /HPF
PH UR STRIP.AUTO: 6.5 [PH] (ref 4.5–8)
PLATELET # BLD AUTO: 280 THOUSANDS/UL (ref 150–450)
PMV BLD AUTO: 8.7 FL (ref 8.9–12.7)
POTASSIUM SERPL-SCNC: 4.1 MMOL/L (ref 3.6–5)
PROT SERPL-MCNC: 6.9 G/DL (ref 5.9–8.4)
PROT UR STRIP-MCNC: NEGATIVE MG/DL
RBC # BLD AUTO: 4.06 MILLION/UL (ref 4–5.2)
RBC #/AREA URNS AUTO: ABNORMAL /HPF
SODIUM SERPL-SCNC: 137 MMOL/L (ref 137–147)
SP GR UR STRIP.AUTO: 1.02 (ref 1–1.04)
UROBILINOGEN UA: NEGATIVE MG/DL
WBC # BLD AUTO: 8.5 THOUSAND/UL (ref 4.5–11)
WBC #/AREA URNS AUTO: ABNORMAL /HPF

## 2018-10-30 PROCEDURE — 96361 HYDRATE IV INFUSION ADD-ON: CPT

## 2018-10-30 PROCEDURE — 96374 THER/PROPH/DIAG INJ IV PUSH: CPT

## 2018-10-30 PROCEDURE — 83735 ASSAY OF MAGNESIUM: CPT | Performed by: EMERGENCY MEDICINE

## 2018-10-30 PROCEDURE — 85025 COMPLETE CBC W/AUTO DIFF WBC: CPT | Performed by: EMERGENCY MEDICINE

## 2018-10-30 PROCEDURE — 81001 URINALYSIS AUTO W/SCOPE: CPT | Performed by: EMERGENCY MEDICINE

## 2018-10-30 PROCEDURE — 80053 COMPREHEN METABOLIC PANEL: CPT | Performed by: EMERGENCY MEDICINE

## 2018-10-30 PROCEDURE — 96375 TX/PRO/DX INJ NEW DRUG ADDON: CPT

## 2018-10-30 PROCEDURE — 99283 EMERGENCY DEPT VISIT LOW MDM: CPT

## 2018-10-30 PROCEDURE — 36415 COLL VENOUS BLD VENIPUNCTURE: CPT | Performed by: EMERGENCY MEDICINE

## 2018-10-30 RX ORDER — ONDANSETRON 2 MG/ML
INJECTION INTRAMUSCULAR; INTRAVENOUS
Status: DISCONTINUED
Start: 2018-10-30 | End: 2018-10-31 | Stop reason: HOSPADM

## 2018-10-30 RX ORDER — KETOROLAC TROMETHAMINE 30 MG/ML
INJECTION, SOLUTION INTRAMUSCULAR; INTRAVENOUS
Status: DISCONTINUED
Start: 2018-10-30 | End: 2018-10-31 | Stop reason: HOSPADM

## 2018-10-30 RX ORDER — KETOROLAC TROMETHAMINE 30 MG/ML
15 INJECTION, SOLUTION INTRAMUSCULAR; INTRAVENOUS ONCE
Status: COMPLETED | OUTPATIENT
Start: 2018-10-30 | End: 2018-10-30

## 2018-10-30 RX ORDER — ONDANSETRON 2 MG/ML
4 INJECTION INTRAMUSCULAR; INTRAVENOUS ONCE
Status: COMPLETED | OUTPATIENT
Start: 2018-10-30 | End: 2018-10-30

## 2018-10-30 RX ORDER — DOCUSATE SODIUM 100 MG/1
CAPSULE, LIQUID FILLED ORAL
Qty: 60 CAPSULE | Refills: 3 | Status: SHIPPED | OUTPATIENT
Start: 2018-10-30 | End: 2020-05-06

## 2018-10-30 RX ADMIN — KETOROLAC TROMETHAMINE 15 MG: 30 INJECTION, SOLUTION INTRAMUSCULAR; INTRAVENOUS at 21:18

## 2018-10-30 RX ADMIN — ONDANSETRON 4 MG: 2 INJECTION, SOLUTION INTRAMUSCULAR; INTRAVENOUS at 21:18

## 2018-10-30 RX ADMIN — SODIUM CHLORIDE 1000 ML: 9 INJECTION, SOLUTION INTRAVENOUS at 21:08

## 2018-10-31 NOTE — DISCHARGE INSTRUCTIONS
B  R  A  T  DIET Your doctor has recommended the B R A T  diet for you or your child until his or her condition improves  This is often used to help control diarrhea and vomiting symptoms  If you or your child can tolerate clear liquids, you may offer: · Bananas · Rice · Applesauce · Toast (and other simple starches such as crackers, potatoes, noodles) Be sure to avoid dairy products, meats, and fatty foods until your child's symptoms are completely better  Acute Diarrhea   WHAT YOU NEED TO KNOW:   What is acute diarrhea? Acute diarrhea starts quickly and lasts a short time, usually 1 to 3 days  It can last up to 2 weeks  What causes acute diarrhea? · Bacteria, such as E coli or salmonella    · Viruses, such as rotavirus and norovirus    · A parasite, such as giardia    · Medicines, such as laxatives, antacids, or antibiotics    · An allergy to lactose, soy, or gluten    · Eating food or drinking water that contains germs    · Medical treatments, such as chemotherapy or radiation  What other signs and symptoms might I have with acute diarrhea? You may have 3 or more episodes of diarrhea  It may be hard to control your diarrhea  You may also have any of the following:  · Fever and chills    · Headache or abdominal pain    · Nausea and vomiting    · Symptoms of dehydration such as thirst, decreased urination, dry skin, sunken eyes, or fast, pounding heartbeat  What does my healthcare provider need to know about my acute diarrhea? Your healthcare provider will ask about your symptoms  He or she will ask what you have recently eaten and if you have traveled to other countries  Tell the provider what medicines you use or if you have been around anyone who is sick  Your healthcare provider may check you for signs of dehydration  How is acute diarrhea treated? Acute diarrhea usually gets better without treatment   You may need any of the following if your diarrhea is severe or lasts longer than a few days:  · Diarrhea medicine  is an over-the-counter medicine that helps slow or stop your diarrhea  · Antibiotics  may be given to help treat an infection caused by bacteria  · Parasite medicine  may be given to treat an infection caused by parasites  How can acute diarrhea be managed? · Drink liquids as directed  Liquids will help prevent dehydration caused by diarrhea  Ask your healthcare provider how much liquid to drink each day and which liquids are best for you  You may need to drink an oral rehydration solution (ORS)  An ORS has the right amounts of water, salts, and sugar you need to replace body fluids  You can buy an ORS at most grocery stores and pharmacies  · Eat foods that are easy to digest   Examples include rice, lentils, cereal, bananas, potatoes, and bread  It also includes some fruits (bananas, melon), well-cooked vegetables, and lean meats  Avoid foods high in fiber, fat, and sugar  Also avoid caffeine, alcohol, dairy, and red meat until your diarrhea is gone  How can acute diarrhea be prevented? · Wash your hands often  Use soap and water  Wash your hands before you eat or prepare food  Also wash your hands after you use the bathroom  Use an alcohol-based hand gel when soap and water are not available  · Keep bathroom surfaces clean  This helps prevent the spread of germs that cause acute diarrhea  · Wash fruits and vegetables well before you eat them  This can help remove germs that cause diarrhea  If possible, remove the skin from fruits and vegetables, or cook them well before you eat them  · Cook meat as directed  ¨ Cook ground meat  to 160°F      ¨ Cook ground poultry, whole poultry, or cuts of poultry  to at least 165°F  Remove the meat from heat  Let it stand for 3 minutes before you eat it  ¨ Cook whole cuts of meat other than poultry  to at least 145°F  Remove the meat from heat  Let it stand for 3 minutes before you eat it      · Wash dishes that have touched raw meat with hot water and soap  This includes cutting boards, utensils, dishes, and serving containers  · Place raw or cooked meat in the refrigerator as soon as possible  Bacteria can grow in meat that is left at room temperature too long  · Do not eat raw or undercooked oysters, clams, or mussels  These foods may be contaminated and cause infection  · Drink filtered or treated water only when you travel  Do not put ice in your drinks  Drink bottled water whenever possible  When should I seek immediate care? · You feel confused  · Your heartbeat is faster than normal      · Your eyes look deeply sunken, or you have no tears when you cry  · You urinate less than usual, or your urine is dark yellow  · You have blood or mucus in your stools  · You have severe abdominal pain  · You are unable to drink any liquids  When should I contact my healthcare provider? · Your symptoms do not get better with treatment  · You have a fever higher than 101 3°F (38 5°C)  · You have trouble eating and drinking because you are vomiting  · You are thirsty or have a dry mouth  · Your diarrhea does not get better in 7 days  · You have questions or concerns about your condition or care  CARE AGREEMENT:   You have the right to help plan your care  Learn about your health condition and how it may be treated  Discuss treatment options with your caregivers to decide what care you want to receive  You always have the right to refuse treatment  The above information is an  only  It is not intended as medical advice for individual conditions or treatments  Talk to your doctor, nurse or pharmacist before following any medical regimen to see if it is safe and effective for you  © 2017 [employer] Information is for End User's use only and may not be sold, redistributed or otherwise used for commercial purposes   All illustrations and images included in CareNotes® are the copyrighted property of [employer]  or [employer]ie [employer]ocha  Acute Nausea and Vomiting   WHAT YOU NEED TO KNOW:   Acute nausea and vomiting start suddenly, worsen quickly, and last a short time  DISCHARGE INSTRUCTIONS:   Seek care immediately if:   · You see blood in your vomit or your bowel movements  · You have sudden, severe pain in your chest and upper abdomen after hard vomiting or retching  · You have swelling in your neck and chest      · You are dizzy, cold, and thirsty and your eyes and mouth are dry  · You are urinating very little or not at all  · You have muscle weakness, leg cramps, and trouble breathing  · Your heart is beating much faster than normal      · You continue to vomit for more than 48 hours  Contact your healthcare provider if:   · You have frequent dry heaves (vomiting but nothing comes out)  · Your nausea and vomiting does not get better or go away after you use medicine  · You have questions or concerns about your condition or treatment  Medicines: You may need any of the following:  · Medicines  may be given to calm your stomach and stop your vomiting  You may also need medicines to help you feel more relaxed or to stop nausea and vomiting caused by motion sickness  · Gastrointestinal stimulants  are used to help empty your stomach and bowels  This may help decrease nausea and vomiting  · Take your medicine as directed  Contact your healthcare provider if you think your medicine is not helping or if you have side effects  Tell him or her if you are allergic to any medicine  Keep a list of the medicines, vitamins, and herbs you take  Include the amounts, and when and why you take them  Bring the list or the pill bottles to follow-up visits  Carry your medicine list with you in case of an emergency  Prevent or manage acute nausea and vomiting:   · Do not drink alcohol    Alcohol may upset or irritate your stomach  Too much alcohol can also cause acute nausea and vomiting  · Control stress  Headaches due to stress may cause nausea and vomiting  Find ways to relax and manage your stress  Get more rest and sleep  · Drink more liquids as directed  Vomiting can lead to dehydration  It is important to drink more liquids to help replace lost body fluids  Ask your healthcare provider how much liquid to drink each day and which liquids are best for you  Your provider may recommend that you drink an oral rehydration solution (ORS)  ORS contains water, salts, and sugar that are needed to replace the lost body fluids  Ask what kind of ORS to use, how much to drink, and where to get it  · Eat smaller meals, more often  Eat small amounts of food every 2 to 3 hours, even if you are not hungry  Food in your stomach may decrease your nausea  · Talk to your healthcare provider before you take over-the-counter (OTC) medicines  These medicines can cause serious problems if you use certain other medicines, or you have a medical condition  You may have problems if you use too much or use them for longer than the label says  Follow directions on the label carefully  Follow up with your healthcare provider as directed:  Write down your questions so you remember to ask them during your visits  © 2017 [employer]  Information is for End User's use only and may not be sold, redistributed or otherwise used for commercial purposes  All illustrations and images included in CareNotes® are the copyrighted property of [employer]  or [employer]ie Rocha  The above information is an  only  It is not intended as medical advice for individual conditions or treatments  Talk to your doctor, nurse or pharmacist before following any medical regimen to see if it is safe and effective for you      Viral Syndrome   WHAT YOU NEED TO KNOW:   Viral syndrome is a term used for a viral infection that has no clear cause  Viruses are spread easily from person to person through the air and on shared items  DISCHARGE INSTRUCTIONS:   Call 911 for the following:   · You have a seizure  · You cannot be woken  · You have chest pain or trouble breathing  Seek care immediately if:   · You have a stiff neck, a bad headache, and sensitivity to light  · You feel weak, dizzy, or confused  · You stop urinating or urinate a lot less than normal      · You cough up blood or thick, yellow or green, mucus  · You have severe abdominal pain or your abdomen is larger than usual   Contact your healthcare provider if:   · Your symptoms do not get better with treatment, or get worse, after 3 days  · You have a rash or ear pain  · You have burning when you urinate  · You have questions or concerns about your condition or care  Medicines: You may  need any of the following:  · Acetaminophen  decreases pain and fever  It is available without a doctor's order  Ask how much medicine to take and how often to take it  Follow directions  Acetaminophen can cause liver damage if not taken correctly  · NSAIDs , such as ibuprofen, help decrease swelling, pain, and fever  NSAIDs can cause stomach bleeding or kidney problems in certain people  If you take blood thinner medicine, always ask your healthcare provider if NSAIDs are safe for you  Always read the medicine label and follow directions  · Cold medicine  helps decrease swelling, control a cough, and relieve chest or nasal congestion  · Saline nasal spray  helps decrease nasal congestion  · Take your medicine as directed  Contact your healthcare provider if you think your medicine is not helping or if you have side effects  Tell him of her if you are allergic to any medicine  Keep a list of the medicines, vitamins, and herbs you take  Include the amounts, and when and why you take them  Bring the list or the pill bottles to follow-up visits   Carry your medicine list with you in case of an emergency  Manage your symptoms:   · Drink liquids as directed  to prevent dehydration  Ask how much liquid to drink each day and which liquids are best for you  Ask if you should drink an oral rehydration solution (ORS)  An ORS has the right amounts of water, salts, and sugar you need to replace body fluids  This may help prevent dehydration caused by vomiting or diarrhea  Do not drink liquids with caffeine  Drinks with caffeine can make dehydration worse  · Get plenty of rest  to help your body heal  Take naps throughout the day  Ask your healthcare provider when you can return to work and your normal activities  · Use a cool mist humidifier  to help you breathe easier if you have nasal or chest congestion  Ask your healthcare provider how to use a cool mist humidifier  · Eat honey or use cough drops  to help decrease throat discomfort  Ask your healthcare provider how much honey you should eat each day  Cough drops are available without a doctor's order  Follow directions for taking cough drops  · Do not smoke and stay away from others who smoke  Nicotine and other chemicals in cigarettes and cigars can cause lung damage  Smoking can also delay healing  Ask your healthcare provider for information if you currently smoke and need help to quit  E-cigarettes or smokeless tobacco still contain nicotine  Talk to your healthcare provider before you use these products  · Wash your hands frequently  to prevent the spread of germs to others  Use soap and water  Use gel hand  when soap and water are not available  Wash your hands after you use the bathroom, cough, or sneeze  Wash your hands before you prepare or eat food  Follow up with your healthcare provider as directed:  Write down your questions so you remember to ask them during your visits    © 2017 [employer] Information is for End User's use only and may not be sold, redistributed or otherwise used for commercial purposes  All illustrations and images included in CareNotes® are the copyrighted property of [employer]  or Markie Rocha  The above information is an  only  It is not intended as medical advice for individual conditions or treatments  Talk to your doctor, nurse or pharmacist before following any medical regimen to see if it is safe and effective for you

## 2018-10-31 NOTE — ED PROVIDER NOTES
History  Chief Complaint   Patient presents with   Janneth Cedars Like Symptoms     patient states that she has a cough, and headache that started yesterday, with vomiting and diarrhea of several episodes each, no meds taken      Patient is a 28-year-old female coming in today with complaints of nausea, vomiting, diarrhea  She states she started with at URI symptoms of runny stuffy nose as well as a nonproductive cough  She had no fevers, chills, recent surgeries, recent travel  Her significant other has similar symptoms at the time  However, over the past 1-2 days she has been having 3-4 episodes of vomiting and diarrhea  She denies any melena, bright red blood per rectum, hematemesis, coffee-ground emesis  She has no abdominal pain  She has decreased p o  intake for solid foods however is been able to tolerate liquids  History provided by:  Patient   used: No    Vomiting   Severity:  Mild  Timing:  Intermittent  Able to tolerate:  Liquids  Progression:  Unchanged  Chronicity:  New  Recent urination:  Normal  Context: not post-tussive and not self-induced    Relieved by:  Nothing  Worsened by:  Nothing  Ineffective treatments:  None tried  Associated symptoms: diarrhea and URI    Associated symptoms: no abdominal pain, no arthralgias, no chills, no cough, no fever, no headaches, no myalgias and no sore throat    Risk factors: no alcohol use, no diabetes, not pregnant, no prior abdominal surgery, no sick contacts, no suspect food intake and no travel to endemic areas        Prior to Admission Medications   Prescriptions Last Dose Informant Patient Reported? Taking?    Methylprednisolone 4 MG TBPK   No No   Sig: Use as directed on package   clotrimazole-betamethasone (LOTRISONE) 1-0 05 % cream   No No   Sig: Apply topically 2 (two) times a day   diclofenac sodium (VOLTAREN) 50 mg EC tablet   No No   Sig: Take 1 tablet (50 mg total) by mouth 2 (two) times a day   docusate sodium (COLACE) 100 mg capsule   No No   Sig: TAKE 1 CAPSULE BY MOUTH TWICE A DAY   ibuprofen (MOTRIN) 600 mg tablet   No No   Si tablet every 8 hours for 10 days then as needed for low back pain   levothyroxine 50 mcg tablet   No No   Sig: TAKE 1 TABLET (50 MCG TOTAL) BY MOUTH DAILY FOR 30 DAYS   levothyroxine 50 mcg tablet   No No   Sig: TAKE 1 TABLET (50 MCG TOTAL) BY MOUTH DAILY FOR 30 DAYS   metFORMIN (GLUCOPHAGE) 500 mg tablet   No No   Sig: TAKE 1 TABLET (500 MG TOTAL) BY MOUTH 2 (TWO) TIMES A DAY WITH MEALS FOR 30 DAYS   polyethylene glycol (GLYCOLAX) powder   No No   Si tablespoons in 8 oz water daily   traMADol (ULTRAM) 50 mg tablet   No No   Sig: Take 1-2 tablets ( mg total) by mouth every 6 (six) hours as needed for moderate pain      Facility-Administered Medications: None       Past Medical History:   Diagnosis Date    Abdominal pain     Amenorrhea     Bacterial vaginosis     Breast pain     Daytime somnolence     Diarrhea     Disease of thyroid gland     Dysmenorrhea     Dysuria     Fibroids     Heavy menses     Hyperglycemia     Irregular menses     Low back pain     Menometrorrhagia     Nausea & vomiting     Obesity     Oligomenorrhea     Right shoulder pain     Uterine fibroid     Wears glasses        Past Surgical History:   Procedure Laterality Date    ABDOMINAL ADHESION SURGERY N/A 2017    Procedure: LYSIS ADHESIONS;  Surgeon: Bob Rosenbaum DO;  [facility];  Service:     CYSTOSCOPY N/A 2017    Procedure: [place];  Surgeon: Bob Rosenbaum DO;  [facility];  Service:    Keren Hannah      MN COLONOSCOPY FLX DX W/COLLJ SPEC WHEN PFRMD N/A 2018    Procedure: COLONOSCOPY;  Surgeon: Ozzie Sargent MD;  Location: [facility];   Service: General    MN LAPAROSCOPY W TOT HYSTERECTUTERUS <=250 [address] TUBE/OVARY N/A 2017    Procedure: HYSTERECTOMY LAPAROSCOPIC TOTAL ,BILATERAL SALPINGECTOMY;  Surgeon: Bob Rosenbaum DO;  [facility];  Service: Gynecology       Family History   Problem Relation Age of Onset    Diabetes Mother     Hypertension Mother     Hyperlipidemia Mother     No Known Problems Father     No Known Problems Brother      I have reviewed and agree with the history as documented  Social History   Substance Use Topics    Smoking status: Never Smoker    Smokeless tobacco: Never Used    Alcohol use No      Comment: social        Review of Systems   Constitutional: Negative for chills, diaphoresis and fever  HENT: Negative for ear pain and sore throat  Eyes: Negative for visual disturbance  Respiratory: Negative for cough, chest tightness and shortness of breath  Cardiovascular: Negative for chest pain and palpitations  Gastrointestinal: Positive for diarrhea and vomiting  Negative for abdominal pain and nausea  Genitourinary: Negative for difficulty urinating and dysuria  Musculoskeletal: Negative for arthralgias, back pain, myalgias and neck pain  Skin: Negative for rash  Neurological: Negative for weakness and headaches  Psychiatric/Behavioral: Negative for confusion  All other systems reviewed and are negative  Physical Exam  Physical Exam   Constitutional: She is oriented to person, place, and time  She appears well-developed and well-nourished  No distress  HENT:   Head: Normocephalic and atraumatic  Mouth/Throat: Oropharynx is clear and moist    Eyes: Pupils are equal, round, and reactive to light  Conjunctivae and EOM are normal    Neck: Normal range of motion  Neck supple  Cardiovascular: Normal rate, regular rhythm, normal heart sounds and intact distal pulses  No murmur heard  Pulmonary/Chest: Effort normal and breath sounds normal  No stridor  No respiratory distress  Abdominal: Soft  Bowel sounds are normal  She exhibits no distension  There is no tenderness  Musculoskeletal: Normal range of motion  She exhibits no edema     Neurological: She is alert and oriented to person, place, and time  She displays normal reflexes  No cranial nerve deficit or sensory deficit  She exhibits normal muscle tone  Coordination normal    Skin: Skin is warm  Capillary refill takes less than 2 seconds  She is not diaphoretic  Nursing note and vitals reviewed        Vital Signs  ED Triage Vitals [10/30/18 2036]   Temperature Pulse Respirations Blood Pressure SpO2   (!) 97 4 °F (36 3 °C) 72 18 121/71 100 %      Temp Source Heart Rate Source Patient Position - Orthostatic VS BP Location FiO2 (%)   Tympanic Monitor Sitting Left arm --      Pain Score       --           Vitals:    10/30/18 2036   BP: 121/71   Pulse: 72   Patient Position - Orthostatic VS: Sitting       Visual Acuity      ED Medications  Medications   sodium chloride 0 9 % bolus 1,000 mL (1,000 mL Intravenous New Bag 10/30/18 2108)   ketorolac (TORADOL) injection 15 mg (15 mg Intravenous Given 10/30/18 2118)   ondansetron (ZOFRAN) injection 4 mg (4 mg Intravenous Given 10/30/18 2118)       Diagnostic Studies  Results Reviewed     Procedure Component Value Units Date/Time    Magnesium [[id number]]  (Normal) Collected:  10/30/18 2108    Lab Status:  Final result Specimen:  Blood from Line, Venous Updated:  10/30/18 2127     Magnesium 1 8 mg/dL     Comprehensive metabolic panel [[id number]]  (Abnormal) Collected:  10/30/18 2108    Lab Status:  Final result Specimen:  Blood from Line, Venous Updated:  10/30/18 2127     Sodium 137 mmol/L      Potassium 4 1 mmol/L      Chloride 102 mmol/L      CO2 30 mmol/L      ANION GAP 5 mmol/L      BUN 14 mg/dL      Creatinine 1 02 mg/dL      Glucose 94 mg/dL      Calcium 9 1 mg/dL      AST 64 (H) U/L      ALT 77 (H) U/L      Alkaline Phosphatase 82 U/L      Total Protein 6 9 g/dL      Albumin 4 2 g/dL      Total Bilirubin 0 30 mg/dL      eGFR 68 ml/min/1 73sq m     Narrative:         National Kidney Disease Education Program recommendations are as follows:  GFR calculation is accurate only with a steady state creatinine  Chronic Kidney disease less than 60 ml/min/1 73 sq  meters  Kidney failure less than 15 ml/min/1 73 sq  meters  UA w Reflex to Microscopic [[id number]]  (Abnormal) Collected:  10/30/18 2108    Lab Status:  Final result Specimen:  Urine from Urine, Clean Catch Updated:  10/30/18 2119     Color, UA Yellow     Clarity, UA Clear     Specific [place], UA 1 020     pH, UA 6 5     Leukocytes, UA Negative     Nitrite, UA Negative     Protein, UA Negative mg/dl      Glucose, UA Negative mg/dl      Ketones, UA Negative mg/dl      Bilirubin, UA Negative     Blood, UA 10 0 (A)     UROBILINOGEN UA Negative mg/dL     CBC and differential [[id number]]  (Abnormal) Collected:  10/30/18 2108    Lab Status:  Final result Specimen:  Blood from Line, Venous Updated:  10/30/18 2118     WBC 8 50 Thousand/uL      RBC 4 06 Million/uL      Hemoglobin 13 4 g/dL      Hematocrit 39 6 %      MCV 98 fL      MCH 33 2 pg      MCHC 34 0 g/dL      RDW 12 9 %      MPV 8 7 (L) fL      Platelets 032 Thousands/uL      Neutrophils Relative 63 %      Lymphocytes Relative 28 %      Monocytes Relative 7 %      Eosinophils Relative 1 %      Basophils Relative 1 %      Neutrophils Absolute 5 40 Thousands/µL      Lymphocytes Absolute 2 40 Thousands/µL      Monocytes Absolute 0 60 Thousand/µL      Eosinophils Absolute 0 10 Thousand/µL      Basophils Absolute 0 10 Thousands/µL     Urine Microscopic [[id number]] Collected:  10/30/18 2108    Lab Status: In process Specimen:  Urine from Urine, Clean Catch Updated:  10/30/18 2117                 No orders to display              Procedures  Procedures       Phone Contacts  ED Phone Contact    ED Course                               MDM  Number of Diagnoses or Management Options  Nausea vomiting and diarrhea:   Viral syndrome:   Diagnosis management comments:   Patient is a 15-year-old female with a history of hypothyroidism coming in today with complaints of nausea, vomiting and diarrhea    On exam she is nontoxic appearing in no apparent distress  Will check labs, give IV fluids as well as meds for symptomatic relief  9:42 PM  Patient updated on labs  She has no vomiting or diarrhea here  Symptoms based on history and physical are consistent with viral syndrome  Will DC home with brought instructions    Portions of the record may have been created with voice recognition software  Occasional wrong word or "sound a like" substitutions may have occurred due to the inherent limitations of voice recognition software  Read the chart carefully and recognize, using context, where substitutions have occurred  CritCare Time    Disposition  Final diagnoses:   Viral syndrome   Nausea vomiting and diarrhea     Time reflects when diagnosis was documented in both MDM as applicable and the Disposition within this note     Time User Action Codes Description Comment    10/30/2018  9:30 PM Tiffanie Spear Add [B34 9] Viral syndrome     10/30/2018  9:30 PM Nicole Toribio Add [R11 2,  R19 7] Nausea vomiting and diarrhea       ED Disposition     ED Disposition Condition Comment    Discharge  Jeannie Crimes discharge to home/self care  Condition at discharge: Stable        Follow-up Information     Follow up With Specialties Details Why Contact Info    Gael Archer DO Family Medicine Schedule an appointment as soon as possible for a visit in 2 days  [address]            Patient's Medications   Discharge Prescriptions    No medications on file     No discharge procedures on file      ED Provider  Electronically Signed by           Melanie Munoz DO  10/30/18 4557

## 2018-11-08 ENCOUNTER — APPOINTMENT (EMERGENCY)
Dept: RADIOLOGY | Facility: HOSPITAL | Age: 43
End: 2018-11-08
Payer: COMMERCIAL

## 2018-11-08 ENCOUNTER — HOSPITAL ENCOUNTER (EMERGENCY)
Facility: HOSPITAL | Age: 43
Discharge: HOME | End: 2018-11-08
Attending: EMERGENCY MEDICINE | Admitting: EMERGENCY MEDICINE
Payer: COMMERCIAL

## 2018-11-08 VITALS
DIASTOLIC BLOOD PRESSURE: 87 MMHG | SYSTOLIC BLOOD PRESSURE: 165 MMHG | BODY MASS INDEX: 34.48 KG/M2 | RESPIRATION RATE: 20 BRPM | HEART RATE: 98 BPM | TEMPERATURE: 98.7 F | WEIGHT: 213.63 LBS | OXYGEN SATURATION: 99 %

## 2018-11-08 DIAGNOSIS — J20.9 ACUTE BRONCHITIS: Primary | ICD-10-CM

## 2018-11-08 PROCEDURE — 71046 X-RAY EXAM CHEST 2 VIEWS: CPT

## 2018-11-08 PROCEDURE — 99283 EMERGENCY DEPT VISIT LOW MDM: CPT

## 2018-11-08 RX ORDER — PREDNISONE 20 MG/1
40 TABLET ORAL ONCE
Status: COMPLETED | OUTPATIENT
Start: 2018-11-08 | End: 2018-11-08

## 2018-11-08 RX ORDER — BENZONATATE 100 MG/1
100 CAPSULE ORAL 3 TIMES DAILY PRN
Qty: 15 CAPSULE | Refills: 0 | Status: SHIPPED | OUTPATIENT
Start: 2018-11-08 | End: 2018-11-13

## 2018-11-08 RX ORDER — PREDNISONE 20 MG/1
20 TABLET ORAL 2 TIMES DAILY WITH MEALS
Qty: 8 TABLET | Refills: 0 | Status: SHIPPED | OUTPATIENT
Start: 2018-11-08 | End: 2018-11-12

## 2018-11-08 RX ADMIN — PREDNISONE 40 MG: 20 TABLET ORAL at 18:20

## 2018-11-08 NOTE — DISCHARGE INSTRUCTIONS
Acute Bronchitis   WHAT YOU NEED TO KNOW:   What is acute bronchitis? Acute bronchitis is swelling and irritation in the air passages of your lungs  This irritation may cause you to cough or have other breathing problems  Acute bronchitis often starts because of another illness, such as a cold or the flu  The illness spreads from your nose and throat to your windpipe and airways  Bronchitis is often called a chest cold  Acute bronchitis lasts about 3 to 6 weeks and is usually not a serious illness  What causes acute bronchitis? · Infection  caused by a virus, bacteria, or a fungus    · Polluted air  caused by chemical fumes, dust, smoke, allergens, or pollution  What increases my risk for acute bronchitis? · Age, usually older adults    · Smoking cigarettes or being around cigarette smoke    · Chronic lung diseases or chronic sinus infections    · Weakened immune system    · Gastroesophageal reflux disease    · Allergies and environmental changes  What are the signs and symptoms of acute bronchitis? · A cough with sputum that may be clear, yellow, or green    · Feeling more tired than usual, and body aches    · A fever and chills    · Wheezing when you breathe    · A tight chest or pain when you breathe or cough  How is acute bronchitis diagnosed? Your healthcare provider may diagnose bronchitis by your symptoms  If he is not sure, you may need the following:  · Blood tests  will be done to see if your symptoms are caused by an infection  · X-ray  pictures of your lungs and heart may show signs of infection, such as pneumonia  Chest x-rays may also show fluid around your heart and lungs  How is acute bronchitis treated? Your healthcare provider will treat any condition that has caused your acute bronchitis  He may also give you any of the following:  · Ibuprofen or acetaminophen  are medicines that help lower your fever  They are available without a doctor's order   Ask your healthcare provider which medicine is right for you  Ask how much to take and how often to take it  Follow directions  These medicines can cause stomach bleeding if not taken correctly  Ibuprofen can cause kidney damage  Do not take ibuprofen if you have kidney disease, an ulcer, or allergies to aspirin  Acetaminophen can cause liver damage  Do not take more than 4,000 milligrams in 24 hours  · Decongestants  help loosen mucus in your lungs and make it easier to cough up  This can help you breathe easier  · Cough suppressants  decrease your urge to cough  If your cough produces mucus, do not take a cough suppressant unless your healthcare provider tells you to  Your healthcare provider may suggest that you take a cough suppressant at night so you can rest     · Inhalers  may be given  Your healthcare provider may give you one or more inhalers to help you breathe easier and cough less  An inhaler gives your medicine to open your airways  Ask your healthcare provider to show you how to use your inhaler correctly  How can I care for myself when I have acute bronchitis? · Get more rest   Rest helps your body to heal  Slowly start to do more each day  Rest when you feel it is needed  · Avoid irritants in the air  Avoid chemicals, fumes, and dust  Wear a face mask if you must work around dust or fumes  Stay inside on days when air pollution levels are high  If you have allergies, stay inside when pollen counts are high  Do not use aerosol products, such as spray-on deodorant, bug spray, and hair spray  · Do not smoke or be around others who smoke  Nicotine and other chemicals in cigarettes and cigars damages the cilia that move mucus out of your lungs  Ask your healthcare provider for information if you currently smoke and need help to quit  E-cigarettes or smokeless tobacco still contain nicotine  Talk to your healthcare provider before you use these products  · Drink liquids as directed    Liquids help keep your air passages moist and help you cough up mucus  You may need to drink more liquids when you have acute bronchitis  Ask how much liquid to drink each day and which liquids are best for you  · Use a humidifier or vaporizer  Use a cool mist humidifier or a vaporizer to increase air moisture in your home  This may make it easier for you to breathe and help decrease your cough  How can I decrease my risk for acute bronchitis? · Get the vaccinations you need  Ask your healthcare provider if you should get vaccinated against the flu or pneumonia  · Prevent the spread of germs  You can decrease your risk of acute bronchitis and other illnesses by doing the following:     [facility] your hands often with soap and water  Carry germ-killing hand lotion or gel with you  You can use the lotion or gel to clean your hands when soap and water are not available  ¨ Do not touch your eyes, nose, or mouth unless you have washed your hands first     ¨ Always cover your mouth when you cough to prevent the spread of germs  It is best to cough into a tissue or your shirt sleeve instead of into your hand  Ask those around you cover their mouths when they cough  ¨ Try to avoid people who have a cold or the flu  If you are sick, stay away from others as much as possible  When should I seek immediate care? · You cough up blood  · Your lips or fingernails turn blue  · You feel like you are not getting enough air when you breathe  When should I contact my healthcare provider? · You have a fever  · Your breathing problems do not go away or get worse  · Your cough does not get better within 4 weeks  · You have questions or concerns about your condition or care  CARE AGREEMENT:   You have the right to help plan your care  Learn about your health condition and how it may be treated  Discuss treatment options with your caregivers to decide what care you want to receive  You always have the right to refuse treatment  The above information is an  only  It is not intended as medical advice for individual conditions or treatments  Talk to your doctor, nurse or pharmacist before following any medical regimen to see if it is safe and effective for you  © 2017 [employer] Information is for End User's use only and may not be sold, redistributed or otherwise used for commercial purposes  All illustrations and images included in CareNotes® are the copyrighted property of [employer]  or Markie Rocha

## 2018-11-08 NOTE — ED PROVIDER NOTES
History  Chief Complaint   Patient presents with    Cough     Pt reports cough and nasal congestion x 4 days  81-year-old female with history of hypothyroidism, presents for evaluation of cough for the past 3 days  Patient reported having cough with clear production as well as nasal congestion and a sore throat  Patient states that she has been taking Tylenol for subjective fevers with some relief  She reports that she is around other sick contacts  She denies any nausea, vomiting, difficulty breathing, shortness of breath, chest pain, abdominal pain  Prior to Admission Medications   Prescriptions Last Dose Informant Patient Reported?  Taking?   docusate sodium (COLACE) 100 mg capsule   No No   Sig: TAKE 1 CAPSULE BY MOUTH TWICE A DAY   ibuprofen (MOTRIN) 600 mg tablet   No No   Si tablet every 8 hours for 10 days then as needed for low back pain   levothyroxine 50 mcg tablet   No No   Sig: TAKE 1 TABLET (50 MCG TOTAL) BY MOUTH DAILY FOR 30 DAYS   polyethylene glycol (GLYCOLAX) powder   No No   Si tablespoons in 8 oz water daily   traMADol (ULTRAM) 50 mg tablet   No No   Sig: Take 1-2 tablets ( mg total) by mouth every 6 (six) hours as needed for moderate pain      Facility-Administered Medications: None       Past Medical History:   Diagnosis Date    Abdominal pain     Amenorrhea     Bacterial vaginosis     Breast pain     Daytime somnolence     Diarrhea     Disease of thyroid gland     Dysmenorrhea     Dysuria     Fibroids     Heavy menses     Hyperglycemia     Irregular menses     Low back pain     Menometrorrhagia     Nausea & vomiting     Obesity     Oligomenorrhea     Right shoulder pain     Uterine fibroid     Wears glasses        Past Surgical History:   Procedure Laterality Date    ABDOMINAL ADHESION SURGERY N/A 2017    Procedure: LYSIS ADHESIONS;  Surgeon: Sadia Canas DO;  [facility];  Service:     CYSTOSCOPY N/A 2017 Procedure: CYSTOSCOPY;  Surgeon: Juan Carlos De Jesus DO;  [facility];  Service:    Alana Tirado      OK COLONOSCOPY FLX DX W/COLLJ SPEC WHEN PFRMD N/A 4/24/2018    Procedure: COLONOSCOPY;  Surgeon: Jean Lopez MD;  [facility]; Service: General    OK LAPAROSCOPY W TOT HYSTERECTUTERUS <=250 GRAM  W TUBE/OVARY N/A 5/19/2017    Procedure: HYSTERECTOMY LAPAROSCOPIC TOTAL ,BILATERAL SALPINGECTOMY;  Surgeon: Juan Carlos De Jesus DO;  [facility];  Service: Gynecology       Family History   Problem Relation Age of Onset    Diabetes Mother     Hypertension Mother     Hyperlipidemia Mother     No Known Problems Father     No Known Problems Brother      I have reviewed and agree with the history as documented  Social History   Substance Use Topics    Smoking status: Never Smoker    Smokeless tobacco: Never Used    Alcohol use No      Comment: social        Review of Systems   Constitutional: Positive for fever  Negative for chills  HENT: Positive for congestion and sore throat  Negative for postnasal drip, sinus pain, sinus pressure, sneezing, trouble swallowing and voice change  Respiratory: Positive for cough  Negative for chest tightness, shortness of breath and wheezing  Cardiovascular: Negative for chest pain  Gastrointestinal: Negative for abdominal pain, constipation, diarrhea, nausea and vomiting  Physical Exam  Physical Exam   Constitutional: She is oriented to person, place, and time  She appears well-developed and well-nourished  No distress  HENT:   Head: Normocephalic and atraumatic  Right Ear: External ear normal    Left Ear: External ear normal    Mouth/Throat: Oropharynx is clear and moist  No oropharyngeal exudate  Eyes: Conjunctivae are normal    Cardiovascular: Normal rate and normal heart sounds  Pulmonary/Chest: Effort normal  No respiratory distress  She has no rhonchi  She has no rales  She exhibits no tenderness     Musculoskeletal: Normal range of motion  Neurological: She is alert and oriented to person, place, and time  Skin: Skin is warm  Capillary refill takes less than 2 seconds  She is not diaphoretic  No erythema  Nursing note and vitals reviewed  Vital Signs  ED Triage Vitals [11/08/18 1745]   Temperature Pulse Respirations Blood Pressure SpO2   98 7 °F (37 1 °C) 98 20 165/87 99 %      Temp Source Heart Rate Source Patient Position - Orthostatic VS BP Location FiO2 (%)   Tympanic Monitor Sitting Left arm --      Pain Score       --           Vitals:    11/08/18 1745   BP: 165/87   Pulse: 98   Patient Position - Orthostatic VS: Sitting       Visual Acuity      ED Medications  Medications   predniSONE tablet 40 mg (40 mg Oral Given 11/8/18 1820)       Diagnostic Studies  Results Reviewed     None                 XR chest 2 views   ED Interpretation by Hilda Larkin PA-C (11/08 1832)   Interpreted by me  No infiltrate, nl cardiac silhouette, no effusions       Final Result by Ryne Bernabe MD (11/08 2247)      No acute cardiopulmonary disease  Workstation performed: [id number]                    Procedures  Procedures       Phone Contacts  ED Phone Contact    ED Course                               MDM  CritCare Time    Disposition  Final diagnoses:   Acute bronchitis     Time reflects when diagnosis was documented in both MDM as applicable and the Disposition within this note     Time User Action Codes Description Comment    11/8/2018  6:32 PM Farhat Perez Add [J20 9] Acute bronchitis       ED Disposition     ED Disposition Condition Comment    Discharge  Getachew Lynn discharge to home/self care      Condition at discharge: Stable        Follow-up Information     Follow up With Specialties Details Why Contact Mylene Leon DO Family Medicine Schedule an appointment as soon as possible for a visit in 1 week  [address]            Discharge Medication List as of 11/8/2018  6:34 PM      START taking these medications    Details   benzonatate (TESSALON PERLES) 100 mg capsule Take 1 capsule (100 mg total) by mouth 3 (three) times a day as needed for cough for up to 5 days, Starting Thu 11/8/2018, Until Tue 11/13/2018, Print      predniSONE 20 mg tablet Take 1 tablet (20 mg total) by mouth 2 (two) times a day with meals for 4 days, Starting Thu 11/8/2018, Until Mon 11/12/2018, Print         CONTINUE these medications which have NOT CHANGED    Details   docusate sodium (COLACE) 100 mg capsule TAKE 1 CAPSULE BY MOUTH TWICE A DAY, Normal      ibuprofen (MOTRIN) 600 mg tablet 1 tablet every 8 hours for 10 days then as needed for low back pain, Normal      levothyroxine 50 mcg tablet TAKE 1 TABLET (50 MCG TOTAL) BY MOUTH DAILY FOR 30 DAYS, Starting Tue 6/26/2018, Until Thu 7/26/2018, Normal      polyethylene glycol (GLYCOLAX) powder 2 tablespoons in 8 oz water daily, Normal      traMADol (ULTRAM) 50 mg tablet Take 1-2 tablets ( mg total) by mouth every 6 (six) hours as needed for moderate pain, Starting Tue 3/6/2018, Print           No discharge procedures on file      ED Provider  Electronically Signed by           Mitchell Dowell PA-C  11/08/18 4885

## 2019-03-08 ENCOUNTER — APPOINTMENT (EMERGENCY)
Dept: RADIOLOGY | Facility: HOSPITAL | Age: 44
End: 2019-03-08

## 2019-03-08 ENCOUNTER — HOSPITAL ENCOUNTER (EMERGENCY)
Facility: HOSPITAL | Age: 44
Discharge: HOME | End: 2019-03-08
Attending: EMERGENCY MEDICINE | Admitting: EMERGENCY MEDICINE

## 2019-03-08 VITALS
WEIGHT: 180 LBS | OXYGEN SATURATION: 100 % | HEART RATE: 84 BPM | TEMPERATURE: 97.7 F | DIASTOLIC BLOOD PRESSURE: 65 MMHG | BODY MASS INDEX: 29.05 KG/M2 | SYSTOLIC BLOOD PRESSURE: 110 MMHG | RESPIRATION RATE: 18 BRPM

## 2019-03-08 DIAGNOSIS — F41.9 ANXIETY: ICD-10-CM

## 2019-03-08 DIAGNOSIS — R07.9 NONSPECIFIC CHEST PAIN: Primary | ICD-10-CM

## 2019-03-08 LAB
ALBUMIN SERPL BCP-MCNC: 3.4 G/DL (ref 3.5–5)
ALP SERPL-CCNC: 76 U/L (ref 46–116)
ALT SERPL W P-5'-P-CCNC: 33 U/L (ref 12–78)
ANION GAP SERPL CALCULATED.3IONS-SCNC: 6 MMOL/L (ref 4–13)
APTT PPP: 30 SECONDS (ref 26–38)
AST SERPL W P-5'-P-CCNC: 19 U/L (ref 5–45)
BACTERIA UR QL AUTO: ABNORMAL /HPF
BASOPHILS # BLD AUTO: 0.04 THOUSANDS/ΜL (ref 0–0.1)
BASOPHILS NFR BLD AUTO: 1 % (ref 0–1)
BILIRUB SERPL-MCNC: 0.18 MG/DL (ref 0.2–1)
BILIRUB UR QL STRIP: NEGATIVE
BUN SERPL-MCNC: 11 MG/DL (ref 5–25)
CALCIUM SERPL-MCNC: 8.7 MG/DL (ref 8.3–10.1)
CHLORIDE SERPL-SCNC: 104 MMOL/L (ref 100–108)
CLARITY UR: CLEAR
CO2 SERPL-SCNC: 30 MMOL/L (ref 21–32)
COLOR UR: YELLOW
CREAT SERPL-MCNC: 0.91 MG/DL (ref 0.6–1.3)
EOSINOPHIL # BLD AUTO: 0.06 THOUSAND/ΜL (ref 0–0.61)
EOSINOPHIL NFR BLD AUTO: 1 % (ref 0–6)
ERYTHROCYTE [DISTWIDTH] IN BLOOD BY AUTOMATED COUNT: 11.9 % (ref 11.6–15.1)
EXT PREG TEST URINE: NEGATIVE
GFR SERPL CREATININE-BSD FRML MDRD: 78 ML/MIN/1.73SQ M
GLUCOSE SERPL-MCNC: 87 MG/DL (ref 65–140)
GLUCOSE UR STRIP-MCNC: NEGATIVE MG/DL
HCT VFR BLD AUTO: 38 % (ref 34.8–46.1)
HGB BLD-MCNC: 12.8 G/DL (ref 11.5–15.4)
HGB UR QL STRIP.AUTO: ABNORMAL
IMM GRANULOCYTES # BLD AUTO: 0.02 THOUSAND/UL (ref 0–0.2)
IMM GRANULOCYTES NFR BLD AUTO: 0 % (ref 0–2)
INR PPP: 0.96 (ref 0.86–1.17)
KETONES UR STRIP-MCNC: NEGATIVE MG/DL
LEUKOCYTE ESTERASE UR QL STRIP: NEGATIVE
LYMPHOCYTES # BLD AUTO: 1.75 THOUSANDS/ΜL (ref 0.6–4.47)
LYMPHOCYTES NFR BLD AUTO: 28 % (ref 14–44)
MCH RBC QN AUTO: 33.3 PG (ref 26.8–34.3)
MCHC RBC AUTO-ENTMCNC: 33.7 G/DL (ref 31.4–37.4)
MCV RBC AUTO: 99 FL (ref 82–98)
MONOCYTES # BLD AUTO: 0.38 THOUSAND/ΜL (ref 0.17–1.22)
MONOCYTES NFR BLD AUTO: 6 % (ref 4–12)
NEUTROPHILS # BLD AUTO: 3.98 THOUSANDS/ΜL (ref 1.85–7.62)
NEUTS SEG NFR BLD AUTO: 64 % (ref 43–75)
NITRITE UR QL STRIP: NEGATIVE
NON-SQ EPI CELLS URNS QL MICRO: ABNORMAL /HPF
NRBC BLD AUTO-RTO: 0 /100 WBCS
PH UR STRIP.AUTO: 5.5 [PH] (ref 4.5–8)
PLATELET # BLD AUTO: 246 THOUSANDS/UL (ref 149–390)
PMV BLD AUTO: 10.2 FL (ref 8.9–12.7)
POTASSIUM SERPL-SCNC: 4.1 MMOL/L (ref 3.5–5.3)
PROT SERPL-MCNC: 6.7 G/DL (ref 6.4–8.2)
PROT UR STRIP-MCNC: NEGATIVE MG/DL
PROTHROMBIN TIME: 12.9 SECONDS (ref 11.8–14.2)
RBC # BLD AUTO: 3.84 MILLION/UL (ref 3.81–5.12)
RBC #/AREA URNS AUTO: ABNORMAL /HPF
SODIUM SERPL-SCNC: 140 MMOL/L (ref 136–145)
SP GR UR STRIP.AUTO: 1.01 (ref 1–1.03)
TROPONIN I SERPL-MCNC: <0.02 NG/ML
TSH SERPL DL<=0.05 MIU/L-ACNC: 1.1 UIU/ML (ref 0.36–3.74)
UROBILINOGEN UR QL STRIP.AUTO: 0.2 E.U./DL
WBC # BLD AUTO: 6.23 THOUSAND/UL (ref 4.31–10.16)
WBC #/AREA URNS AUTO: ABNORMAL /HPF

## 2019-03-08 PROCEDURE — 85730 THROMBOPLASTIN TIME PARTIAL: CPT | Performed by: EMERGENCY MEDICINE

## 2019-03-08 PROCEDURE — 84484 ASSAY OF TROPONIN QUANT: CPT | Performed by: EMERGENCY MEDICINE

## 2019-03-08 PROCEDURE — 96374 THER/PROPH/DIAG INJ IV PUSH: CPT

## 2019-03-08 PROCEDURE — 81025 URINE PREGNANCY TEST: CPT | Performed by: EMERGENCY MEDICINE

## 2019-03-08 PROCEDURE — 99285 EMERGENCY DEPT VISIT HI MDM: CPT

## 2019-03-08 PROCEDURE — 81003 URINALYSIS AUTO W/O SCOPE: CPT

## 2019-03-08 PROCEDURE — 71046 X-RAY EXAM CHEST 2 VIEWS: CPT

## 2019-03-08 PROCEDURE — 85610 PROTHROMBIN TIME: CPT | Performed by: EMERGENCY MEDICINE

## 2019-03-08 PROCEDURE — 96361 HYDRATE IV INFUSION ADD-ON: CPT

## 2019-03-08 PROCEDURE — 80053 COMPREHEN METABOLIC PANEL: CPT | Performed by: EMERGENCY MEDICINE

## 2019-03-08 PROCEDURE — 84443 ASSAY THYROID STIM HORMONE: CPT | Performed by: EMERGENCY MEDICINE

## 2019-03-08 PROCEDURE — 36415 COLL VENOUS BLD VENIPUNCTURE: CPT | Performed by: EMERGENCY MEDICINE

## 2019-03-08 PROCEDURE — 81001 URINALYSIS AUTO W/SCOPE: CPT

## 2019-03-08 PROCEDURE — 85025 COMPLETE CBC W/AUTO DIFF WBC: CPT | Performed by: EMERGENCY MEDICINE

## 2019-03-08 PROCEDURE — 93005 ELECTROCARDIOGRAM TRACING: CPT

## 2019-03-08 RX ORDER — LORAZEPAM 0.5 MG/1
0.5 TABLET ORAL EVERY 12 HOURS PRN
Qty: 15 TABLET | Refills: 0 | Status: SHIPPED | OUTPATIENT
Start: 2019-03-08 | End: 2019-07-29 | Stop reason: ALTCHOICE

## 2019-03-08 RX ORDER — LORAZEPAM 2 MG/ML
0.5 INJECTION INTRAMUSCULAR ONCE
Status: COMPLETED | OUTPATIENT
Start: 2019-03-08 | End: 2019-03-08

## 2019-03-08 RX ADMIN — LORAZEPAM 0.5 MG: 2 INJECTION INTRAMUSCULAR; INTRAVENOUS at 11:36

## 2019-03-08 RX ADMIN — SODIUM CHLORIDE 500 ML: 0.9 INJECTION, SOLUTION INTRAVENOUS at 11:35

## 2019-03-08 NOTE — ED PROVIDER NOTES
History  Chief Complaint   Patient presents with    Rapid Heart Rate     Patient reports feeling like her heart is racing and she is having palpitations for 1 week  Reports dizziness   Headache     Patient c/o L sided headache for the past week  Vomitied 2 times +nausea  C/o L sided chest pain and palpitations for one week  Her brother was shot and killed last week  Prior to Admission Medications   Prescriptions Last Dose Informant Patient Reported?  Taking?   docusate sodium (COLACE) 100 mg capsule   No Yes   Sig: TAKE 1 CAPSULE BY MOUTH TWICE A DAY   ibuprofen (MOTRIN) 600 mg tablet   No Yes   Si tablet every 8 hours for 10 days then as needed for low back pain   levothyroxine 50 mcg tablet   No Yes   Sig: TAKE 1 TABLET (50 MCG TOTAL) BY MOUTH DAILY FOR 30 DAYS   polyethylene glycol (GLYCOLAX) powder   No Yes   Si tablespoons in 8 oz water daily   traMADol (ULTRAM) 50 mg tablet   No Yes   Sig: Take 1-2 tablets ( mg total) by mouth every 6 (six) hours as needed for moderate pain      Facility-Administered Medications: None       Past Medical History:   Diagnosis Date    Abdominal pain     Amenorrhea     Bacterial vaginosis     Breast pain     Daytime somnolence     Diarrhea     Disease of thyroid gland     Dysmenorrhea     Dysuria     Fibroids     Heavy menses     Hyperglycemia     Irregular menses     Low back pain     Menometrorrhagia     Nausea & vomiting     Obesity     Oligomenorrhea     Right shoulder pain     Uterine fibroid     Wears glasses        Past Surgical History:   Procedure Laterality Date    ABDOMINAL ADHESION SURGERY N/A 2017    Procedure: LYSIS ADHESIONS;  Surgeon: Jonatan Valerio DO;  [facility];  Service:     CYSTOSCOPY N/A 2017    Procedure: Juluis Door;  Surgeon: Jonatan Valerio DO;  [facility];  Service:     CYSTOSCOPY      [place] COLONOSCOPY FLX DX W/COLLJ SPEC WHEN PFRMD N/A 2018    Procedure: COLONOSCOPY;  Surgeon: Mare Brittle, MD;  [facility]; Service: General    GA LAPAROSCOPY W TOT HYSTERECTUTERUS <=250 GRAM  W TUBE/OVARY N/A 5/19/2017    Procedure: HYSTERECTOMY LAPAROSCOPIC TOTAL ,BILATERAL SALPINGECTOMY;  Surgeon: Erika Beth DO;  [facility];  Service: Gynecology       Family History   Problem Relation Age of Onset    Diabetes Mother     Hypertension Mother     Hyperlipidemia Mother     No Known Problems Father     No Known Problems Brother      I have reviewed and agree with the history as documented  Social History     Tobacco Use    Smoking status: Never Smoker    Smokeless tobacco: Never Used   Substance Use Topics    Alcohol use: No     Comment: social    Drug use: No        Review of Systems   Constitutional: Negative for appetite change, fatigue and fever  HENT: Negative for rhinorrhea and sore throat  Respiratory: Negative for cough, shortness of breath and wheezing  Cardiovascular: Positive for chest pain and palpitations  Negative for leg swelling  Gastrointestinal: Negative for abdominal pain, diarrhea and vomiting  Genitourinary: Negative for dysuria and flank pain  Musculoskeletal: Negative for back pain and neck pain  Skin: Negative for rash  Neurological: Negative for syncope and headaches  Psychiatric/Behavioral:        Mood normal       Physical Exam  Physical Exam   Constitutional: She is oriented to person, place, and time  She appears well-developed and well-nourished  HENT:   Head: Normocephalic and atraumatic  Neck: Normal range of motion  Neck supple  Cardiovascular: Normal rate and regular rhythm  Pulmonary/Chest: Effort normal and breath sounds normal    Abdominal: Soft  There is no tenderness  Musculoskeletal: Normal range of motion  Neurological: She is alert and oriented to person, place, and time  Skin: Skin is warm and dry  Nursing note and vitals reviewed        Vital Signs  ED Triage Vitals [03/08/19 1109]   Temperature Pulse Respirations Blood Pressure SpO2   97 7 °F (36 5 °C) 57 18 123/64 100 %      Temp Source Heart Rate Source Patient Position - Orthostatic VS BP Location FiO2 (%)   Oral Monitor Lying Right arm --      Pain Score       Worst Possible Pain           Vitals:    03/08/19 1109 03/08/19 1307   BP: 123/64 110/65   Pulse: 57 84   Patient Position - Orthostatic VS: Lying Lying       Visual Acuity      ED Medications  Medications   sodium chloride 0 9 % bolus 500 mL (0 mL Intravenous Stopped 3/8/19 1213)   LORazepam (ATIVAN) 2 mg/mL injection 0 5 mg (0 5 mg Intravenous Given 3/8/19 1136)       Diagnostic Studies  Results Reviewed     Procedure Component Value Units Date/Time    Urine Microscopic [[id number]]  (Abnormal) Collected:  03/08/19 1134    Lab Status:  Edited Result - FINAL Specimen:  Urine, Clean Catch Updated:  03/08/19 1236     RBC, UA None Seen /hpf      WBC, UA 0-1 /hpf      Epithelial Cells Occasional /hpf      Bacteria, UA Occasional /hpf     TSH [[id number]]  (Normal) Collected:  03/08/19 1135    Lab Status:  Final result Specimen:  Blood from Arm, Left Updated:  03/08/19 1230     TSH 3RD GENERATON 1 097 uIU/mL     Narrative:       Patients undergoing fluorescein dye angiography may retain small amounts of fluorescein in the body for 48-72 hours post procedure  Samples containing fluorescein can produce falsely depressed TSH values  If the patient had this procedure,a specimen should be resubmitted post fluorescein clearance      Troponin I [[id number]]  (Normal) Collected:  03/08/19 1136    Lab Status:  Final result Specimen:  Blood from Arm, Left Updated:  03/08/19 1224     Troponin I <0 02 ng/mL     Comprehensive metabolic panel [[id number]]  (Abnormal) Collected:  03/08/19 1135    Lab Status:  Final result Specimen:  Blood from Arm, Left Updated:  03/08/19 1217     Sodium 140 mmol/L      Potassium 4 1 mmol/L      Chloride 104 mmol/L      CO2 30 mmol/L      ANION GAP 6 mmol/L BUN 11 mg/dL      Creatinine 0 91 mg/dL      Glucose 87 mg/dL      Calcium 8 7 mg/dL      AST 19 U/L      ALT 33 U/L      Alkaline Phosphatase 76 U/L      Total Protein 6 7 g/dL      Albumin 3 4 g/dL      Total Bilirubin 0 18 mg/dL      eGFR 78 ml/min/1 73sq m     Narrative:       National Kidney Disease Education Program recommendations are as follows:  GFR calculation is accurate only with a steady state creatinine  Chronic Kidney disease less than 60 ml/min/1 73 sq  meters  Kidney failure less than 15 ml/min/1 73 sq  meters      Protime-INR [[id number]]  (Normal) Collected:  03/08/19 1135    Lab Status:  Final result Specimen:  Blood from Arm, Left Updated:  03/08/19 1214     Protime 12 9 seconds      INR 0 96    APTT [[id number]]  (Normal) Collected:  03/08/19 1135    Lab Status:  Final result Specimen:  Blood from Arm, Left Updated:  03/08/19 1214     PTT 30 seconds     CBC and differential [[id number]]  (Abnormal) Collected:  03/08/19 1135    Lab Status:  Final result Specimen:  Blood from Arm, Left Updated:  03/08/19 1149     WBC 6 23 Thousand/uL      RBC 3 84 Million/uL      Hemoglobin 12 8 g/dL      Hematocrit 38 0 %      MCV 99 fL      MCH 33 3 pg      MCHC 33 7 g/dL      RDW 11 9 %      MPV 10 2 fL      Platelets 930 Thousands/uL      nRBC 0 /100 WBCs      Neutrophils Relative 64 %      Immat GRANS % 0 %      Lymphocytes Relative 28 %      Monocytes Relative 6 %      Eosinophils Relative 1 %      Basophils Relative 1 %      Neutrophils Absolute 3 98 Thousands/µL      Immature Grans Absolute 0 02 Thousand/uL      Lymphocytes Absolute 1 75 Thousands/µL      Monocytes Absolute 0 38 Thousand/µL      Eosinophils Absolute 0 06 Thousand/µL      Basophils Absolute 0 04 Thousands/µL     POCT pregnancy, urine [[id number]]  (Normal) Resulted:  03/08/19 1134    Lab Status:  Final result Updated:  03/08/19 1134     EXT PREG TEST UR (Ref: Negative) negative    POCT urinalysis dipstick [[id number]]  (Abnormal) Resulted: 03/08/19 1134    Lab Status:  Final result Specimen:  Urine Updated:  03/08/19 1134    ED Urine Macroscopic [[id number]]  (Abnormal) Collected:  03/08/19 1134    Lab Status:  Final result Specimen:  Urine Updated:  03/08/19 1133     Color, UA Yellow     Clarity, UA Clear     pH, UA 5 5     Leukocytes, UA Negative     Nitrite, UA Negative     Protein, UA Negative mg/dl      Glucose, UA Negative mg/dl      Ketones, UA Negative mg/dl      Urobilinogen, UA 0 2 E U /dl      Bilirubin, UA Negative     Blood, UA Trace     Specific [place], UA 1 015    Narrative:       CLINITEK RESULT                 XR chest 2 views   Final Result by Chavez Kaiser MD (03/08 1328)      No acute cardiopulmonary disease  Workstation performed: [id number]                    Procedures  ECG 12 Lead Documentation  Date/Time: 3/8/2019 11:11 AM  Performed by: Tito Veronica MD  Authorized by: Tito Veronica MD     Rate:     ECG rate:  56    ECG rate assessment: bradycardic    Comments:      No st elevation/depression           Phone Contacts  ED Phone Contact    ED Course                               MDM  Number of Diagnoses or Management Options  Anxiety:   Nonspecific chest pain:      Amount and/or Complexity of Data Reviewed  Clinical lab tests: ordered and reviewed  Tests in the radiology section of CPT®: ordered and reviewed    Risk of Complications, Morbidity, and/or Mortality  Presenting problems: moderate  General comments: Patient felt better in the ER- she is stable for outpatient follow-up          Disposition  Final diagnoses:   Nonspecific chest pain   Anxiety     Time reflects when diagnosis was documented in both MDM as applicable and the Disposition within this note     Time User Action Codes Description Comment    3/8/2019  1:27 PM Audra ALVARENGA Add [R07 9] Nonspecific chest pain     3/8/2019  1:27 PM Camryn Benedict Add [F41 9] Anxiety       ED Disposition     ED Disposition Condition Date/Time Comment    Discharge Stable Fri Mar 8, 2019  1:27 PM Dubois Justice discharge to home/self care  Follow-up Information     Follow up With Specialties Details Why [address], DO Family Medicine   [address]  [phone]            Discharge Medication List as of 3/8/2019  1:28 PM      START taking these medications    Details   LORazepam (ATIVAN) 0 5 mg tablet Take 1 tablet (0 5 mg total) by mouth every 12 (twelve) hours as needed for anxiety for up to 10 days, Starting Fri 3/8/2019, Until Mon 3/18/2019, Print         CONTINUE these medications which have NOT CHANGED    Details   docusate sodium (COLACE) 100 mg capsule TAKE 1 CAPSULE BY MOUTH TWICE A DAY, Normal      ibuprofen (MOTRIN) 600 mg tablet 1 tablet every 8 hours for 10 days then as needed for low back pain, Normal      levothyroxine 50 mcg tablet TAKE 1 TABLET (50 MCG TOTAL) BY MOUTH DAILY FOR 30 DAYS, Starting Tue 6/26/2018, Until Fri 3/8/2019, Normal      polyethylene glycol (GLYCOLAX) powder 2 tablespoons in 8 oz water daily, Normal      traMADol (ULTRAM) 50 mg tablet Take 1-2 tablets ( mg total) by mouth every 6 (six) hours as needed for moderate pain, Starting Tue 3/6/2018, Print           No discharge procedures on file      ED Provider  Electronically Signed by           Rashad Avalos MD  03/08/19 5790

## 2019-03-11 LAB
ATRIAL RATE: 56 BPM
P AXIS: 62 DEGREES
PR INTERVAL: 154 MS
QRS AXIS: 67 DEGREES
QRSD INTERVAL: 76 MS
QT INTERVAL: 428 MS
QTC INTERVAL: 413 MS
T WAVE AXIS: 65 DEGREES
VENTRICULAR RATE: 56 BPM

## 2019-03-11 PROCEDURE — 93010 ELECTROCARDIOGRAM REPORT: CPT | Performed by: INTERNAL MEDICINE

## 2019-07-09 ENCOUNTER — APPOINTMENT (EMERGENCY)
Dept: RADIOLOGY | Facility: HOSPITAL | Age: 44
End: 2019-07-09
Payer: COMMERCIAL

## 2019-07-09 ENCOUNTER — HOSPITAL ENCOUNTER (EMERGENCY)
Facility: HOSPITAL | Age: 44
Discharge: HOME | End: 2019-07-09
Attending: EMERGENCY MEDICINE | Admitting: EMERGENCY MEDICINE
Payer: COMMERCIAL

## 2019-07-09 VITALS
RESPIRATION RATE: 18 BRPM | TEMPERATURE: 98 F | WEIGHT: 207.23 LBS | SYSTOLIC BLOOD PRESSURE: 125 MMHG | DIASTOLIC BLOOD PRESSURE: 60 MMHG | HEART RATE: 71 BPM | BODY MASS INDEX: 33.45 KG/M2 | OXYGEN SATURATION: 98 %

## 2019-07-09 DIAGNOSIS — M62.838 MUSCLE SPASM: ICD-10-CM

## 2019-07-09 DIAGNOSIS — S29.012A MUSCLE STRAIN OF RIGHT UPPER BACK, INITIAL ENCOUNTER: Primary | ICD-10-CM

## 2019-07-09 LAB
ALBUMIN SERPL BCP-MCNC: 3.6 G/DL (ref 3.5–5)
ALP SERPL-CCNC: 88 U/L (ref 46–116)
ALT SERPL W P-5'-P-CCNC: 34 U/L (ref 12–78)
ANION GAP SERPL CALCULATED.3IONS-SCNC: 9 MMOL/L (ref 4–13)
APTT PPP: 30 SECONDS (ref 23–37)
AST SERPL W P-5'-P-CCNC: 18 U/L (ref 5–45)
BASOPHILS # BLD AUTO: 0.04 THOUSANDS/ΜL (ref 0–0.1)
BASOPHILS NFR BLD AUTO: 1 % (ref 0–1)
BILIRUB SERPL-MCNC: 0.25 MG/DL (ref 0.2–1)
BUN SERPL-MCNC: 13 MG/DL (ref 5–25)
CALCIUM SERPL-MCNC: 9.7 MG/DL (ref 8.3–10.1)
CHLORIDE SERPL-SCNC: 102 MMOL/L (ref 100–108)
CO2 SERPL-SCNC: 27 MMOL/L (ref 21–32)
CREAT SERPL-MCNC: 0.9 MG/DL (ref 0.6–1.3)
EOSINOPHIL # BLD AUTO: 0.1 THOUSAND/ΜL (ref 0–0.61)
EOSINOPHIL NFR BLD AUTO: 1 % (ref 0–6)
ERYTHROCYTE [DISTWIDTH] IN BLOOD BY AUTOMATED COUNT: 11.8 % (ref 11.6–15.1)
GFR SERPL CREATININE-BSD FRML MDRD: 79 ML/MIN/1.73SQ M
GLUCOSE SERPL-MCNC: 99 MG/DL (ref 65–140)
HCT VFR BLD AUTO: 42.5 % (ref 34.8–46.1)
HGB BLD-MCNC: 14.1 G/DL (ref 11.5–15.4)
IMM GRANULOCYTES # BLD AUTO: 0.02 THOUSAND/UL (ref 0–0.2)
IMM GRANULOCYTES NFR BLD AUTO: 0 % (ref 0–2)
INR PPP: 0.99 (ref 0.84–1.19)
LYMPHOCYTES # BLD AUTO: 1.76 THOUSANDS/ΜL (ref 0.6–4.47)
LYMPHOCYTES NFR BLD AUTO: 24 % (ref 14–44)
MCH RBC QN AUTO: 32.2 PG (ref 26.8–34.3)
MCHC RBC AUTO-ENTMCNC: 33.2 G/DL (ref 31.4–37.4)
MCV RBC AUTO: 97 FL (ref 82–98)
MONOCYTES # BLD AUTO: 0.5 THOUSAND/ΜL (ref 0.17–1.22)
MONOCYTES NFR BLD AUTO: 7 % (ref 4–12)
NEUTROPHILS # BLD AUTO: 4.87 THOUSANDS/ΜL (ref 1.85–7.62)
NEUTS SEG NFR BLD AUTO: 67 % (ref 43–75)
NRBC BLD AUTO-RTO: 0 /100 WBCS
PLATELET # BLD AUTO: 274 THOUSANDS/UL (ref 149–390)
PMV BLD AUTO: 10.4 FL (ref 8.9–12.7)
POTASSIUM SERPL-SCNC: 4 MMOL/L (ref 3.5–5.3)
PROT SERPL-MCNC: 7.3 G/DL (ref 6.4–8.2)
PROTHROMBIN TIME: 13.2 SECONDS (ref 11.6–14.5)
RBC # BLD AUTO: 4.38 MILLION/UL (ref 3.81–5.12)
SODIUM SERPL-SCNC: 138 MMOL/L (ref 136–145)
TROPONIN I SERPL-MCNC: <0.02 NG/ML
WBC # BLD AUTO: 7.29 THOUSAND/UL (ref 4.31–10.16)

## 2019-07-09 PROCEDURE — 84484 ASSAY OF TROPONIN QUANT: CPT | Performed by: EMERGENCY MEDICINE

## 2019-07-09 PROCEDURE — 85025 COMPLETE CBC W/AUTO DIFF WBC: CPT | Performed by: EMERGENCY MEDICINE

## 2019-07-09 PROCEDURE — 99284 EMERGENCY DEPT VISIT MOD MDM: CPT

## 2019-07-09 PROCEDURE — 99284 EMERGENCY DEPT VISIT MOD MDM: CPT | Performed by: EMERGENCY MEDICINE

## 2019-07-09 PROCEDURE — 96361 HYDRATE IV INFUSION ADD-ON: CPT

## 2019-07-09 PROCEDURE — 96374 THER/PROPH/DIAG INJ IV PUSH: CPT

## 2019-07-09 PROCEDURE — 96375 TX/PRO/DX INJ NEW DRUG ADDON: CPT

## 2019-07-09 PROCEDURE — 85610 PROTHROMBIN TIME: CPT | Performed by: EMERGENCY MEDICINE

## 2019-07-09 PROCEDURE — 80053 COMPREHEN METABOLIC PANEL: CPT | Performed by: EMERGENCY MEDICINE

## 2019-07-09 PROCEDURE — 85730 THROMBOPLASTIN TIME PARTIAL: CPT | Performed by: EMERGENCY MEDICINE

## 2019-07-09 PROCEDURE — 36415 COLL VENOUS BLD VENIPUNCTURE: CPT | Performed by: EMERGENCY MEDICINE

## 2019-07-09 PROCEDURE — 93005 ELECTROCARDIOGRAM TRACING: CPT

## 2019-07-09 PROCEDURE — 71046 X-RAY EXAM CHEST 2 VIEWS: CPT

## 2019-07-09 RX ORDER — KETOROLAC TROMETHAMINE 30 MG/ML
30 INJECTION, SOLUTION INTRAMUSCULAR; INTRAVENOUS ONCE
Status: COMPLETED | OUTPATIENT
Start: 2019-07-09 | End: 2019-07-09

## 2019-07-09 RX ORDER — MAGNESIUM HYDROXIDE/ALUMINUM HYDROXICE/SIMETHICONE 120; 1200; 1200 MG/30ML; MG/30ML; MG/30ML
30 SUSPENSION ORAL ONCE
Status: COMPLETED | OUTPATIENT
Start: 2019-07-09 | End: 2019-07-09

## 2019-07-09 RX ORDER — DIAZEPAM 5 MG/ML
5 INJECTION, SOLUTION INTRAMUSCULAR; INTRAVENOUS ONCE
Status: COMPLETED | OUTPATIENT
Start: 2019-07-09 | End: 2019-07-09

## 2019-07-09 RX ORDER — IBUPROFEN 600 MG/1
600 TABLET ORAL EVERY 6 HOURS PRN
Qty: 20 TABLET | Refills: 0 | Status: SHIPPED | OUTPATIENT
Start: 2019-07-09 | End: 2020-05-06

## 2019-07-09 RX ORDER — DIAZEPAM 5 MG/1
5 TABLET ORAL 2 TIMES DAILY
Qty: 15 TABLET | Refills: 0 | Status: SHIPPED | OUTPATIENT
Start: 2019-07-09 | End: 2019-07-29 | Stop reason: ALTCHOICE

## 2019-07-09 RX ADMIN — KETOROLAC TROMETHAMINE 30 MG: 30 INJECTION, SOLUTION INTRAMUSCULAR at 08:50

## 2019-07-09 RX ADMIN — FAMOTIDINE 20 MG: 10 INJECTION, SOLUTION INTRAVENOUS at 09:36

## 2019-07-09 RX ADMIN — ALUMINUM HYDROXIDE, MAGNESIUM HYDROXIDE, AND SIMETHICONE 30 ML: 200; 200; 20 SUSPENSION ORAL at 09:36

## 2019-07-09 RX ADMIN — SODIUM CHLORIDE 1000 ML: 0.9 INJECTION, SOLUTION INTRAVENOUS at 08:56

## 2019-07-09 RX ADMIN — DIAZEPAM 5 MG: 5 INJECTION, SOLUTION INTRAMUSCULAR; INTRAVENOUS at 08:50

## 2019-07-09 NOTE — ED PROVIDER NOTES
History  Chief Complaint   Patient presents with    Back Pain     Patient reports back pressure radiating into her chest and R hand  Patient reports the pressure started yesterday  Patient denies SOB, n/v  Patient reports pain is worse with movement  Also reporting dizziness  Complain of right upper back pain radiating to her right chest since yesterday  She did not lift anything heavy or do any strenuous activity  She has no cardiac history and no risk for CAD  No history of DVT or PE and no risk factors for them  She denies any shortness of breath  No cough, no fevers, no abdominal pain, no nausea/vomiting/diarrhea  Pain is worse with movement or touch to the area  Prior to Admission Medications   Prescriptions Last Dose Informant Patient Reported? Taking?    LORazepam (ATIVAN) 0 5 mg tablet   No No   Sig: Take 1 tablet (0 5 mg total) by mouth every 12 (twelve) hours as needed for anxiety for up to 10 days   docusate sodium (COLACE) 100 mg capsule   No No   Sig: TAKE 1 CAPSULE BY MOUTH TWICE A DAY   ibuprofen (MOTRIN) 600 mg tablet   No No   Si tablet every 8 hours for 10 days then as needed for low back pain   levothyroxine 50 mcg tablet   No No   Sig: TAKE 1 TABLET (50 MCG TOTAL) BY MOUTH DAILY FOR 30 DAYS   polyethylene glycol (GLYCOLAX) powder   No No   Si tablespoons in 8 oz water daily   traMADol (ULTRAM) 50 mg tablet   No No   Sig: Take 1-2 tablets ( mg total) by mouth every 6 (six) hours as needed for moderate pain      Facility-Administered Medications: None       Past Medical History:   Diagnosis Date    Abdominal pain     Amenorrhea     Bacterial vaginosis     Breast pain     Daytime somnolence     Diarrhea     Disease of thyroid gland     Dysmenorrhea     Dysuria     Fibroids     Heavy menses     Hyperglycemia     Irregular menses     Low back pain     Menometrorrhagia     Nausea & vomiting     Obesity     Oligomenorrhea     Right shoulder pain     Uterine fibroid     Wears glasses        Past Surgical History:   Procedure Laterality Date    ABDOMINAL ADHESION SURGERY N/A 5/19/2017    Procedure: LYSIS ADHESIONS;  Surgeon: Janneth Hager DO;  Location: [facility];  Service:    Katharina Antunez N/A 5/19/2017    Procedure: [place];  Surgeon: Janneth Hager DO;  Location: [facility];  Service:     CYSTOSCOPY      HYSTERECTOMY      VA COLONOSCOPY FLX DX W/COLLJ SPEC WHEN PFRMD N/A 4/24/2018    Procedure: COLONOSCOPY;  Surgeon: Tevin Goel MD;  Location: [facility]; Service: General    VA LAPAROSCOPY W TOT HYSTERECTUTERUS <=250 GRAM  W TUBE/OVARY N/A 5/19/2017    Procedure: HYSTERECTOMY LAPAROSCOPIC TOTAL ,BILATERAL SALPINGECTOMY;  Surgeon: Janneth Hager DO;  Location[facility];  Service: Gynecology       Family History   Problem Relation Age of Onset    Diabetes Mother     Hypertension Mother     Hyperlipidemia Mother     No Known Problems Father     No Known Problems Brother      I have reviewed and agree with the history as documented  Social History     Tobacco Use    Smoking status: Never Smoker    Smokeless tobacco: Never Used   Substance Use Topics    Alcohol use: No     Comment: social    Drug use: No        Review of Systems   Constitutional: Negative for appetite change, fatigue and fever  HENT: Negative for rhinorrhea and sore throat  Respiratory: Negative for cough, shortness of breath and wheezing  Cardiovascular: Positive for chest pain  Negative for leg swelling  Gastrointestinal: Negative for abdominal pain, diarrhea and vomiting  Genitourinary: Negative for dysuria and flank pain  Musculoskeletal: Positive for back pain  Negative for neck pain  Skin: Negative for rash  Neurological: Negative for syncope and headaches  Psychiatric/Behavioral:        Mood normal       Physical Exam  Physical Exam   Constitutional: She is oriented to person, place, and time   She appears well-developed and well-nourished  HENT:   Head: Normocephalic and atraumatic  Neck: Normal range of motion  Neck supple  Cardiovascular: Normal rate and regular rhythm  Pulmonary/Chest: Effort normal and breath sounds normal    Abdominal: Soft  There is no tenderness  Musculoskeletal:   Right upper thoracic scapular area and right upper chest wall with tenderness to light touch, pain is worse with movement of the right arm, neurovascularly intact   Neurological: She is alert and oriented to person, place, and time  Skin: Skin is warm and dry  No erythema  Nursing note and vitals reviewed        Vital Signs  ED Triage Vitals   Temperature Pulse Respirations Blood Pressure SpO2   07/09/19 0811 07/09/19 0811 07/09/19 0811 07/09/19 0811 07/09/19 0811   98 °F (36 7 °C) 65 18 136/72 99 %      Temp Source Heart Rate Source Patient Position - Orthostatic VS BP Location FiO2 (%)   07/09/19 0811 07/09/19 0811 07/09/19 0811 07/09/19 1005 --   Oral Monitor Lying Right arm       Pain Score       07/09/19 0811       Worst Possible Pain           Vitals:    07/09/19 0811 07/09/19 1005   BP: 136/72 125/60   Pulse: 65 71   Patient Position - Orthostatic VS: Lying Lying         Visual Acuity      ED Medications  Medications   sodium chloride 0 9 % bolus 1,000 mL (0 mL Intravenous Stopped 7/9/19 1002)   ketorolac (TORADOL) injection 30 mg (30 mg Intravenous Given 7/9/19 0850)   diazepam (VALIUM) injection 5 mg (5 mg Intravenous Given 7/9/19 0850)   famotidine (PEPCID) injection 20 mg (20 mg Intravenous Given 7/9/19 0936)   aluminum-magnesium hydroxide-simethicone (MYLANTA) 200-200-20 mg/5 mL oral suspension 30 mL (30 mL Oral Given 7/9/19 0936)       Diagnostic Studies  Results Reviewed     Procedure Component Value Units Date/Time    Troponin I [[id number]]  (Normal) Collected:  07/09/19 0850    Lab Status:  Final result Specimen:  Blood from Arm, Right Updated:  07/09/19 8851     Troponin I <0 02 ng/mL     Comprehensive metabolic panel [[id number]] Collected:  07/09/19 0850    Lab Status:  Final result Specimen:  Blood from Arm, Right Updated:  07/09/19 0920     Sodium 138 mmol/L      Potassium 4 0 mmol/L      Chloride 102 mmol/L      CO2 27 mmol/L      ANION GAP 9 mmol/L      BUN 13 mg/dL      Creatinine 0 90 mg/dL      Glucose 99 mg/dL      Calcium 9 7 mg/dL      AST 18 U/L      ALT 34 U/L      Alkaline Phosphatase 88 U/L      Total Protein 7 3 g/dL      Albumin 3 6 g/dL      Total Bilirubin 0 25 mg/dL      eGFR 79 ml/min/1 73sq m     Narrative:       [place] guidelines for Chronic Kidney Disease (CKD):     Stage 1 with normal or high GFR (GFR > 90 mL/min/1 73 square meters)    Stage 2 Mild CKD (GFR = 60-89 mL/min/1 73 square meters)    Stage 3A Moderate CKD (GFR = 45-59 mL/min/1 73 square meters)    Stage 3B Moderate CKD (GFR = 30-44 mL/min/1 73 square meters)    Stage 4 Severe CKD (GFR = 15-29 mL/min/1 73 square meters)    Stage 5 End Stage CKD (GFR <15 mL/min/1 73 square meters)  Note: GFR calculation is accurate only with a steady state creatinine    Protime-INR [[id number]]  (Normal) Collected:  07/09/19 0850    Lab Status:  Final result Specimen:  Blood from Arm, Right Updated:  07/09/19 0919     Protime 13 2 seconds      INR 0 99    APTT [[id number]]  (Normal) Collected:  07/09/19 0850    Lab Status:  Final result Specimen:  Blood from Arm, Right Updated:  07/09/19 0919     PTT 30 seconds     CBC and differential [[id number]] Collected:  07/09/19 0850    Lab Status:  Final result Specimen:  Blood from Arm, Right Updated:  07/09/19 0904     WBC 7 29 Thousand/uL      RBC 4 38 Million/uL      Hemoglobin 14 1 g/dL      Hematocrit 42 5 %      MCV 97 fL      MCH 32 2 pg      MCHC 33 2 g/dL      RDW 11 8 %      MPV 10 4 fL      Platelets 225 Thousands/uL      nRBC 0 /100 WBCs      Neutrophils Relative 67 %      Immat GRANS % 0 %      Lymphocytes Relative 24 %      Monocytes Relative 7 %      Eosinophils Relative 1 % Basophils Relative 1 %      Neutrophils Absolute 4 87 Thousands/µL      Immature Grans Absolute 0 02 Thousand/uL      Lymphocytes Absolute 1 76 Thousands/µL      Monocytes Absolute 0 50 Thousand/µL      Eosinophils Absolute 0 10 Thousand/µL      Basophils Absolute 0 04 Thousands/µL                  XR chest 2 views   Final Result by Chata Hardwick MD (07/09 1010)      No acute cardiopulmonary disease  Workstation performed: [id number]                    Procedures  ECG 12 Lead Documentation Only  Date/Time: 7/9/2019 8:10 AM  Performed by: Jenn Cassidy MD  Authorized by: Jenn Cassidy MD     Rate:     ECG rate:  68    ECG rate assessment: normal    Rhythm:     Rhythm: sinus rhythm    ST segments:     ST segments:  Normal           ED Course                         Wells' Criteria for PE      Most Recent Value   Wells' Criteria for PE   Clinical signs and symptoms of DVT  0 Filed at: 07/09/2019 0808   PE is primary diagnosis or equally likely  0 Filed at: 07/09/2019 0833   HR >100  0 Filed at: 07/09/2019 0833   Immobilization at least 3 days or Surgery in the previous 4 weeks  0 Filed at: 07/09/2019 8091   Previous, objectively diagnosed PE or DVT  0 Filed at: 07/09/2019 0833   Hemoptysis  0 Filed at: 07/09/2019 5263   Malignancy with treatment within 6 months or palliative  0 Filed at: 07/09/2019 6526   Wells' Criteria Total  0 Filed at: 07/09/2019 3113            MDM  Number of Diagnoses or Management Options  Muscle spasm:   Muscle strain of right upper back, initial encounter:      Amount and/or Complexity of Data Reviewed  Clinical lab tests: ordered and reviewed  Tests in the radiology section of CPT®: ordered and reviewed    Risk of Complications, Morbidity, and/or Mortality  Presenting problems: moderate  General comments: Patient felt better in the ER    She is stable for outpatient follow-up        Disposition  Final diagnoses:   Muscle strain of right upper back, initial encounter   Muscle spasm     Time reflects when diagnosis was documented in both MDM as applicable and the Disposition within this note     Time User Action Codes Description Comment    7/9/2019  9:53 AM Lopez-Rodriguez, Olegario Homans R Add [S29 012A] Muscle strain of right upper back, initial encounter     7/9/2019  9:55 AM Leonardo Tan Add [M19 [id number]] Muscle spasm       ED Disposition     ED Disposition Condition Date/Time Comment    Discharge Stable Tue Jul 9, 2019  9:52 AM Raisa Vigil discharge to home/self care  Follow-up Information     Follow up With Specialties Details Why [address] MD Internal Medicine   [address]  [place]  [address]  [id number] [phone]            Discharge Medication List as of 7/9/2019  9:55 AM      START taking these medications    Details   diazepam (VALIUM) 5 mg tablet Take 1 tablet (5 mg total) by mouth 2 (two) times a day for 10 days, Starting Tue 7/9/2019, Until Fri 7/19/2019, Print      !! ibuprofen (MOTRIN) 600 mg tablet Take 1 tablet (600 mg total) by mouth every 6 (six) hours as needed for moderate pain, Starting Tue 7/9/2019, Print       !! - Potential duplicate medications found  Please discuss with provider        CONTINUE these medications which have NOT CHANGED    Details   docusate sodium (COLACE) 100 mg capsule TAKE 1 CAPSULE BY MOUTH TWICE A DAY, Normal      !! ibuprofen (MOTRIN) 600 mg tablet 1 tablet every 8 hours for 10 days then as needed for low back pain, Normal      levothyroxine 50 mcg tablet TAKE 1 TABLET (50 MCG TOTAL) BY MOUTH DAILY FOR 30 DAYS, Starting Tue 6/26/2018, Until Fri 3/8/2019, Normal      LORazepam (ATIVAN) 0 5 mg tablet Take 1 tablet (0 5 mg total) by mouth every 12 (twelve) hours as needed for anxiety for up to 10 days, Starting Fri 3/8/2019, Until Mon 3/18/2019, Print      polyethylene glycol (GLYCOLAX) powder 2 tablespoons in 8 oz water daily, Normal      traMADol (ULTRAM) 50 mg tablet Take 1-2 tablets ( mg total) by mouth every 6 (six) hours as needed for moderate pain, Starting Tue 3/6/2018, Print       !! - Potential duplicate medications found  Please discuss with provider  No discharge procedures on file      ED Provider  Electronically Signed by           Kevin Jensen MD  07/09/19 6305

## 2019-07-11 LAB
ATRIAL RATE: 68 BPM
P AXIS: 67 DEGREES
PR INTERVAL: 142 MS
QRS AXIS: 81 DEGREES
QRSD INTERVAL: 76 MS
QT INTERVAL: 386 MS
QTC INTERVAL: 410 MS
T WAVE AXIS: 43 DEGREES
VENTRICULAR RATE: 68 BPM

## 2019-07-11 PROCEDURE — 93010 ELECTROCARDIOGRAM REPORT: CPT | Performed by: INTERNAL MEDICINE

## 2019-07-24 RX ORDER — DULOXETIN HYDROCHLORIDE 60 MG/1
60 CAPSULE, DELAYED RELEASE ORAL DAILY
COMMUNITY
Start: 2019-01-31 | End: 2019-07-29

## 2019-07-29 ENCOUNTER — OFFICE VISIT (OUTPATIENT)
Dept: FAMILY MEDICINE CLINIC | Facility: CLINIC | Age: 44
End: 2019-07-29
Payer: COMMERCIAL

## 2019-07-29 VITALS
OXYGEN SATURATION: 97 % | HEART RATE: 98 BPM | WEIGHT: 207 LBS | TEMPERATURE: 97 F | HEIGHT: 67 IN | SYSTOLIC BLOOD PRESSURE: 108 MMHG | BODY MASS INDEX: 32.49 KG/M2 | DIASTOLIC BLOOD PRESSURE: 70 MMHG

## 2019-07-29 DIAGNOSIS — R73.9 HYPERGLYCEMIA: ICD-10-CM

## 2019-07-29 DIAGNOSIS — M47.816 LUMBAR SPONDYLOSIS: ICD-10-CM

## 2019-07-29 DIAGNOSIS — E03.9 ACQUIRED HYPOTHYROIDISM: Primary | ICD-10-CM

## 2019-07-29 DIAGNOSIS — F41.9 ANXIETY: ICD-10-CM

## 2019-07-29 DIAGNOSIS — Z00.01 ENCOUNTER FOR WELL ADULT EXAM WITH ABNORMAL FINDINGS: ICD-10-CM

## 2019-07-29 DIAGNOSIS — Z13.220 SCREENING FOR CHOLESTEROL LEVEL: ICD-10-CM

## 2019-07-29 DIAGNOSIS — Z12.39 SCREENING FOR BREAST CANCER: ICD-10-CM

## 2019-07-29 PROCEDURE — 99204 OFFICE O/P NEW MOD 45 MIN: CPT | Performed by: NURSE PRACTITIONER

## 2019-07-29 RX ORDER — METHOCARBAMOL 750 MG/1
750 TABLET, FILM COATED ORAL EVERY 6 HOURS PRN
Qty: 60 TABLET | Refills: 0 | Status: SHIPPED | OUTPATIENT
Start: 2019-07-29 | End: 2019-08-27 | Stop reason: SDUPTHER

## 2019-07-29 RX ORDER — HYDROXYZINE HYDROCHLORIDE 25 MG/1
25 TABLET, FILM COATED ORAL
Qty: 30 TABLET | Refills: 1 | Status: SHIPPED | OUTPATIENT
Start: 2019-07-29 | End: 2019-08-27 | Stop reason: SDUPTHER

## 2019-07-29 RX ORDER — LEVOTHYROXINE SODIUM 0.05 MG/1
50 TABLET ORAL DAILY
Qty: 30 TABLET | Refills: 2 | Status: SHIPPED | OUTPATIENT
Start: 2019-07-29 | End: 2019-08-27 | Stop reason: SDUPTHER

## 2019-07-29 RX ORDER — DICLOFENAC POTASSIUM 50 MG/1
50 TABLET, FILM COATED ORAL 2 TIMES DAILY
COMMUNITY
End: 2019-07-29 | Stop reason: ALTCHOICE

## 2019-07-29 RX ORDER — DULOXETIN HYDROCHLORIDE 30 MG/1
30 CAPSULE, DELAYED RELEASE ORAL DAILY
Qty: 30 CAPSULE | Refills: 1 | Status: SHIPPED | OUTPATIENT
Start: 2019-07-29 | End: 2019-08-27 | Stop reason: SDUPTHER

## 2019-07-29 NOTE — PROGRESS NOTES
Assessment/Plan:    Acquired hypothyroidism  -Repeat TSH level  To continue on levothyroxine 50mcg as indicated and will adjust accordingly  Lumbar spondylosis  -Per xray from ER visit patient with mild spondylosis  I am recommending NSAID, with muscle relaxer  Advised to avoid driving while taking this medication  Also use of Cymbalta for this recurrent issue  Pt advised to follow up if s/s do not improve      -Referral to Orthopedics given today for further evaluation  Hyperglycemia  Ordering CMP and Hgb A1c    Screening for cholesterol level  -Ordering lipid panel  BMI Counseling: Body mass index is 32 91 kg/m²  Discussed the patient's BMI with her  The BMI is above average  BMI counseling and education was provided to the patient  Nutrition recommendations include reducing portion sizes, 3-5 servings of fruits/vegetables daily, consuming healthier snacks, decreasing soda and/or juice intake, reducing intake of saturated fat and trans fat and reducing intake of cholesterol  Exercise recommendations include moderate aerobic physical activity for 150 minutes/week  Diagnoses and all orders for this visit:    Acquired hypothyroidism  -     levothyroxine 50 mcg tablet; Take 1 tablet (50 mcg total) by mouth daily for 255 days  -     TSH, 3rd generation with Free T4 reflex; Future    Lumbar spondylosis  -     Ambulatory referral to Orthopedic Surgery; Future  -     DULoxetine (CYMBALTA) 30 mg delayed release capsule; Take 1 capsule (30 mg total) by mouth daily  -     methocarbamol (ROBAXIN) 750 mg tablet; Take 1 tablet (750 mg total) by mouth every 6 (six) hours as needed for muscle spasms    Screening for breast cancer  -     Cancel: Mammo screening bilateral w 3d & cad; Future  -     Mammo screening bilateral w cad; Future    Screening for cholesterol level  -     Lipid panel; Future    Hyperglycemia  -     Comprehensive metabolic panel;  Future  -     Hemoglobin A1C; Future    Anxiety  -     hydrOXYzine HCL (ATARAX) 25 mg tablet; Take 1 tablet (25 mg total) by mouth daily at bedtime    Encounter for well adult exam with abnormal findings  -     CBC and differential; Future    Other orders  -     Discontinue: DULoxetine (CYMBALTA) 60 mg delayed release capsule; Take 60 mg by mouth daily  -     Discontinue: diclofenac potassium (CATAFLAM) 50 mg tablet; Take 50 mg by mouth 2 (two) times a day          Subjective:      Patient ID: Saravanan Culp is a 44 y o  female  44year old female patient here to establish care  Was seeing Dr Tatiana Coyne and then she lost her insurance  States she stopped taking her medications  Takes medications for her thyroid  Has not taken her meds for 2 years now  Thyroid Problem   Presents for initial visit  Symptoms include anxiety, fatigue and weight gain  Patient reports no palpitations or weight loss  The symptoms have been worsening  Past treatments include levothyroxine  The treatment provided no relief  The following procedures have not been performed: radioiodine uptake scan, thyroid FNA, thyroid ultrasound and thyroidectomy  Her past medical history is significant for obesity  There is no history of atrial fibrillation, diabetes, hyperlipidemia, neuropathy or osteopenia  Risk factors include family history of hypothyroidism  Back Pain   This is a chronic problem  The current episode started more than 1 year ago  The problem occurs constantly  The problem has been waxing and waning since onset  The pain is present in the thoracic spine  The quality of the pain is described as aching  Pertinent negatives include no chest pain or weight loss  The following portions of the patient's history were reviewed and updated as appropriate: allergies, current medications, past family history, past medical history, past social history, past surgical history and problem list     Review of Systems   Constitutional: Positive for fatigue and weight gain   Negative for appetite change, unexpected weight change and weight loss  HENT: Negative  Eyes: Negative  Respiratory: Negative  Negative for cough, chest tightness and wheezing  Cardiovascular: Negative  Negative for chest pain, palpitations and leg swelling  Gastrointestinal: Negative  Endocrine: Negative  Genitourinary: Negative  Musculoskeletal: Positive for back pain  Skin: Negative  Allergic/Immunologic: Negative  Neurological: Negative  Hematological: Negative  Psychiatric/Behavioral: The patient is nervous/anxious  Objective:      /70 (BP Location: Right arm, Patient Position: Sitting, Cuff Size: Adult)   Pulse 98   Temp (!) 97 °F (36 1 °C) (Tympanic)   Ht 5' 6 5" (1 689 m)   Wt 93 9 kg (207 lb)   LMP  (LMP Unknown)   SpO2 97%   BMI 32 91 kg/m²          Physical Exam   Constitutional: She is oriented to person, place, and time  She appears well-developed and well-nourished  No distress  HENT:   Head: Normocephalic and atraumatic  Eyes: Pupils are equal, round, and reactive to light  Conjunctivae and EOM are normal    Neck: Normal range of motion  Neck supple  Cardiovascular: Normal rate, regular rhythm, normal heart sounds and intact distal pulses  Pulmonary/Chest: Effort normal and breath sounds normal  No respiratory distress  She has no wheezes  She has no rales  Abdominal: Soft  Bowel sounds are normal    Musculoskeletal: Normal range of motion  She exhibits tenderness  Cervical back: She exhibits tenderness  She exhibits normal range of motion, no swelling and no edema  Lumbar back: She exhibits tenderness  She exhibits normal range of motion and no swelling  Back:    Lymphadenopathy:     She has no cervical adenopathy  Neurological: She is alert and oriented to person, place, and time  She has normal reflexes  Skin: Skin is warm and dry  Capillary refill takes less than 2 seconds  She is not diaphoretic     Psychiatric: She has a normal mood and affect  Thought content normal    Nursing note and vitals reviewed

## 2019-07-30 PROBLEM — Z13.220 SCREENING FOR CHOLESTEROL LEVEL: Status: ACTIVE | Noted: 2019-07-30

## 2019-07-30 PROBLEM — M47.816 LUMBAR SPONDYLOSIS: Status: ACTIVE | Noted: 2019-07-30

## 2019-07-30 NOTE — ASSESSMENT & PLAN NOTE
-Per xray from ER visit patient with mild spondylosis  I am recommending NSAID, with muscle relaxer  Advised to avoid driving while taking this medication  Also use of Cymbalta for this recurrent issue  Pt advised to follow up if s/s do not improve      -Referral to Orthopedics given today for further evaluation

## 2019-08-10 ENCOUNTER — APPOINTMENT (OUTPATIENT)
Dept: LAB | Facility: HOSPITAL | Age: 44
End: 2019-08-10
Payer: COMMERCIAL

## 2019-08-10 DIAGNOSIS — E03.9 ACQUIRED HYPOTHYROIDISM: ICD-10-CM

## 2019-08-10 DIAGNOSIS — R73.9 HYPERGLYCEMIA: ICD-10-CM

## 2019-08-10 DIAGNOSIS — Z00.01 ENCOUNTER FOR WELL ADULT EXAM WITH ABNORMAL FINDINGS: ICD-10-CM

## 2019-08-10 DIAGNOSIS — Z13.220 SCREENING FOR CHOLESTEROL LEVEL: ICD-10-CM

## 2019-08-10 LAB
ALBUMIN SERPL BCP-MCNC: 3.5 G/DL (ref 3.5–5)
ALP SERPL-CCNC: 83 U/L (ref 46–116)
ALT SERPL W P-5'-P-CCNC: 31 U/L (ref 12–78)
ANION GAP SERPL CALCULATED.3IONS-SCNC: 8 MMOL/L (ref 4–13)
AST SERPL W P-5'-P-CCNC: 17 U/L (ref 5–45)
BASOPHILS # BLD AUTO: 0.06 THOUSANDS/ΜL (ref 0–0.1)
BASOPHILS NFR BLD AUTO: 1 % (ref 0–1)
BILIRUB SERPL-MCNC: 0.36 MG/DL (ref 0.2–1)
BUN SERPL-MCNC: 13 MG/DL (ref 5–25)
CALCIUM SERPL-MCNC: 9 MG/DL (ref 8.3–10.1)
CHLORIDE SERPL-SCNC: 106 MMOL/L (ref 100–108)
CHOLEST SERPL-MCNC: 218 MG/DL (ref 50–200)
CO2 SERPL-SCNC: 26 MMOL/L (ref 21–32)
CREAT SERPL-MCNC: 0.98 MG/DL (ref 0.6–1.3)
EOSINOPHIL # BLD AUTO: 0.2 THOUSAND/ΜL (ref 0–0.61)
EOSINOPHIL NFR BLD AUTO: 3 % (ref 0–6)
ERYTHROCYTE [DISTWIDTH] IN BLOOD BY AUTOMATED COUNT: 11.8 % (ref 11.6–15.1)
EST. AVERAGE GLUCOSE BLD GHB EST-MCNC: 114 MG/DL
GFR SERPL CREATININE-BSD FRML MDRD: 71 ML/MIN/1.73SQ M
GLUCOSE P FAST SERPL-MCNC: 111 MG/DL (ref 65–99)
HBA1C MFR BLD: 5.6 % (ref 4.2–6.3)
HCT VFR BLD AUTO: 42.6 % (ref 34.8–46.1)
HDLC SERPL-MCNC: 60 MG/DL (ref 40–60)
HGB BLD-MCNC: 14.1 G/DL (ref 11.5–15.4)
IMM GRANULOCYTES # BLD AUTO: 0.02 THOUSAND/UL (ref 0–0.2)
IMM GRANULOCYTES NFR BLD AUTO: 0 % (ref 0–2)
LDLC SERPL CALC-MCNC: 145 MG/DL (ref 0–100)
LYMPHOCYTES # BLD AUTO: 2.11 THOUSANDS/ΜL (ref 0.6–4.47)
LYMPHOCYTES NFR BLD AUTO: 28 % (ref 14–44)
MCH RBC QN AUTO: 32.3 PG (ref 26.8–34.3)
MCHC RBC AUTO-ENTMCNC: 33.1 G/DL (ref 31.4–37.4)
MCV RBC AUTO: 98 FL (ref 82–98)
MONOCYTES # BLD AUTO: 0.41 THOUSAND/ΜL (ref 0.17–1.22)
MONOCYTES NFR BLD AUTO: 5 % (ref 4–12)
NEUTROPHILS # BLD AUTO: 4.84 THOUSANDS/ΜL (ref 1.85–7.62)
NEUTS SEG NFR BLD AUTO: 63 % (ref 43–75)
NONHDLC SERPL-MCNC: 158 MG/DL
NRBC BLD AUTO-RTO: 0 /100 WBCS
PLATELET # BLD AUTO: 277 THOUSANDS/UL (ref 149–390)
PMV BLD AUTO: 10 FL (ref 8.9–12.7)
POTASSIUM SERPL-SCNC: 4.4 MMOL/L (ref 3.5–5.3)
PROT SERPL-MCNC: 7.2 G/DL (ref 6.4–8.2)
RBC # BLD AUTO: 4.36 MILLION/UL (ref 3.81–5.12)
SODIUM SERPL-SCNC: 140 MMOL/L (ref 136–145)
TRIGL SERPL-MCNC: 65 MG/DL
TSH SERPL DL<=0.05 MIU/L-ACNC: 2.17 UIU/ML (ref 0.36–3.74)
WBC # BLD AUTO: 7.64 THOUSAND/UL (ref 4.31–10.16)

## 2019-08-10 PROCEDURE — 36415 COLL VENOUS BLD VENIPUNCTURE: CPT

## 2019-08-10 PROCEDURE — 84443 ASSAY THYROID STIM HORMONE: CPT

## 2019-08-10 PROCEDURE — 80061 LIPID PANEL: CPT

## 2019-08-10 PROCEDURE — 83036 HEMOGLOBIN GLYCOSYLATED A1C: CPT

## 2019-08-10 PROCEDURE — 85025 COMPLETE CBC W/AUTO DIFF WBC: CPT

## 2019-08-10 PROCEDURE — 80053 COMPREHEN METABOLIC PANEL: CPT

## 2019-08-27 ENCOUNTER — OFFICE VISIT (OUTPATIENT)
Dept: FAMILY MEDICINE CLINIC | Facility: CLINIC | Age: 44
End: 2019-08-27
Payer: COMMERCIAL

## 2019-08-27 VITALS
RESPIRATION RATE: 20 BRPM | OXYGEN SATURATION: 98 % | HEIGHT: 66 IN | HEART RATE: 100 BPM | TEMPERATURE: 97.2 F | WEIGHT: 208.6 LBS | SYSTOLIC BLOOD PRESSURE: 130 MMHG | DIASTOLIC BLOOD PRESSURE: 80 MMHG | BODY MASS INDEX: 33.52 KG/M2

## 2019-08-27 DIAGNOSIS — E03.9 ACQUIRED HYPOTHYROIDISM: ICD-10-CM

## 2019-08-27 DIAGNOSIS — Z00.01 ENCOUNTER FOR WELL ADULT EXAM WITH ABNORMAL FINDINGS: Primary | ICD-10-CM

## 2019-08-27 DIAGNOSIS — K29.00 ACUTE GASTRITIS WITHOUT HEMORRHAGE, UNSPECIFIED GASTRITIS TYPE: ICD-10-CM

## 2019-08-27 DIAGNOSIS — Z00.00 ANNUAL PHYSICAL EXAM: ICD-10-CM

## 2019-08-27 DIAGNOSIS — M47.816 LUMBAR SPONDYLOSIS: ICD-10-CM

## 2019-08-27 DIAGNOSIS — F41.9 ANXIETY: ICD-10-CM

## 2019-08-27 PROCEDURE — 99396 PREV VISIT EST AGE 40-64: CPT | Performed by: NURSE PRACTITIONER

## 2019-08-27 RX ORDER — HYDROXYZINE HYDROCHLORIDE 25 MG/1
25 TABLET, FILM COATED ORAL
Qty: 30 TABLET | Refills: 1 | Status: SHIPPED | OUTPATIENT
Start: 2019-08-27

## 2019-08-27 RX ORDER — METHOCARBAMOL 750 MG/1
750 TABLET, FILM COATED ORAL EVERY 6 HOURS PRN
Qty: 60 TABLET | Refills: 0 | Status: SHIPPED | OUTPATIENT
Start: 2019-08-27 | End: 2020-05-06

## 2019-08-27 RX ORDER — LEVOTHYROXINE SODIUM 0.05 MG/1
50 TABLET ORAL DAILY
Qty: 30 TABLET | Refills: 5 | Status: SHIPPED | OUTPATIENT
Start: 2019-08-27 | End: 2020-05-08

## 2019-08-27 RX ORDER — DULOXETIN HYDROCHLORIDE 30 MG/1
30 CAPSULE, DELAYED RELEASE ORAL DAILY
Qty: 30 CAPSULE | Refills: 1 | Status: SHIPPED | OUTPATIENT
Start: 2019-08-27 | End: 2020-05-06

## 2019-08-27 NOTE — PROGRESS NOTES
Assessment/Plan:    Encounter for well adult exam with abnormal findings  Patient is here for physical exam we find this visit without any distress or abnormalities  We recommended exercise at least three and 0 5 hours per week and healthy diet with a low carbohydrate and low saturated fat  Began to follow up in one year for regular physical exams  Diagnoses and all orders for this visit:    Encounter for well adult exam with abnormal findings    Acquired hypothyroidism  -     levothyroxine 50 mcg tablet; Take 1 tablet (50 mcg total) by mouth daily for 255 days  -     TSH, 3rd generation with Free T4 reflex; Future    Lumbar spondylosis  -     DULoxetine (CYMBALTA) 30 mg delayed release capsule; Take 1 capsule (30 mg total) by mouth daily  -     methocarbamol (ROBAXIN) 750 mg tablet; Take 1 tablet (750 mg total) by mouth every 6 (six) hours as needed for muscle spasms    Anxiety  -     hydrOXYzine HCL (ATARAX) 25 mg tablet; Take 1 tablet (25 mg total) by mouth daily at bedtime    Acute gastritis without hemorrhage, unspecified gastritis type  -     Ambulatory referral to Gastroenterology; Future          Subjective:      Patient ID: Elmira Dorsey is a 44 y o  female  44year old female patient here for her annual physical today and medication refills        The following portions of the patient's history were reviewed and updated as appropriate: allergies, current medications, past family history, past medical history, past social history, past surgical history and problem list     Review of Systems      Objective:      /80   Pulse 100   Temp (!) 97 2 °F (36 2 °C) (Temporal)   Resp 20   Ht 5' 6" (1 676 m)   Wt 94 6 kg (208 lb 9 6 oz)   LMP  (LMP Unknown)   SpO2 98%   BMI 33 67 kg/m²          Physical Exam

## 2019-08-28 PROBLEM — Z00.01 ENCOUNTER FOR WELL ADULT EXAM WITH ABNORMAL FINDINGS: Status: ACTIVE | Noted: 2019-08-28

## 2019-08-28 NOTE — PATIENT INSTRUCTIONS

## 2019-08-28 NOTE — PROGRESS NOTES
ADULT ANNUAL PHYSICAL  [facility] - [facility]    NAME: Wayne Nathan  AGE: 44 y o  SEX: female  : 1975     DATE: 2019     Assessment and Plan:     Problem List Items Addressed This Visit        Endocrine    Acquired hypothyroidism     Discussed with patient that the goal of treatment is reduction of symptoms and prevention of long term complications  Treatment is usually given upon establishing the diagnosis and is lifelong  Discussed with patient that there is no evidence that "natural" or pig thyroid extract provides clinically reliable therapy  Also discussed with patient the main complication of over-replacement of thyroid hormone Is increased risk of osteoporosis and a-fib  We discussed increasing or decreasing in small increment every 8 weeks to normalize TSH level  Once we receive at least 2 normal TSH levels we will monitor TSH level every 6-12 months  Relevant Medications    levothyroxine 50 mcg tablet    Other Relevant Orders    TSH, 3rd generation with Free T4 reflex       Musculoskeletal and Integument    Lumbar spondylosis     Patient advised to continue to follow up with orthopedic as previously discussed  Currently using Cymbalta, and Robaxin for her chronic back pain  Also advised use of NSAIDs  Relevant Medications    DULoxetine (CYMBALTA) 30 mg delayed release capsule    methocarbamol (ROBAXIN) 750 mg tablet       Other    Encounter for well adult exam with abnormal findings - Primary     Patient is here for physical exam we find this visit without any distress or abnormalities  We recommended exercise at least three and 0 5 hours per week and healthy diet with a low carbohydrate and low saturated fat  Began to follow up in one year for regular physical exams             Other Visit Diagnoses     Anxiety        Relevant Medications    hydrOXYzine HCL (ATARAX) 25 mg tablet    Acute gastritis without hemorrhage, unspecified gastritis type        Relevant Orders    Ambulatory referral to Gastroenterology    Annual physical exam              Immunizations and preventive care screenings were discussed with patient today  Appropriate education was printed on patient's after visit summary  Counseling:  · BMI Counseling: Body mass index is 33 67 kg/m²  Discussed the patient's BMI with her  The BMI is above average  BMI counseling and education was provided to the patient  Nutrition recommendations include reducing portion sizes, 3-5 servings of fruits/vegetables daily, consuming healthier snacks, moderation in carbohydrate intake, reducing intake of saturated fat and trans fat and reducing intake of cholesterol  Exercise recommendations include moderate aerobic physical activity for 150 minutes/week  Return in about 6 months (around 2/27/2020) for Recheck  Chief Complaint:     Chief Complaint   Patient presents with    Physical Exam      History of Present Illness:     Adult Annual Physical   Patient here for a comprehensive physical exam  The patient reports no problems  Diet and Physical Activity  · Diet/Nutrition: poor diet, frequent junk food and high fat diet  · Exercise: no formal exercise  Depression Screening  PHQ-9 Depression Screening    PHQ-9:    Frequency of the following problems over the past two weeks:       Little interest or pleasure in doing things:  0 - not at all  Feeling down, depressed, or hopeless:  0 - not at all  PHQ-2 Score:  0       General Health  · Sleep: gets 4-6 hours of sleep on average  · Hearing: normal - bilateral   · Vision: no vision problems  · Dental: no dental visits for >1 year  /GYN Health  · Patient is: premenopausal  · Last menstrual period: unknow  · Contraceptive method: hysterectomy  Review of Systems:     Review of Systems   Constitutional: Positive for appetite change and unexpected weight change  HENT: Negative  Eyes: Negative  Respiratory: Negative  Negative for cough, chest tightness and wheezing  Cardiovascular: Negative  Negative for chest pain and palpitations  Gastrointestinal: Negative  Endocrine: Negative  Negative for cold intolerance, polydipsia and polyuria  Genitourinary: Negative  Musculoskeletal: Positive for arthralgias (chronic back pain)  Skin: Negative  Negative for color change and rash  Allergic/Immunologic: Negative  Neurological: Negative  Hematological: Negative  Psychiatric/Behavioral: Negative  Past Medical History:     Past Medical History:   Diagnosis Date    Abdominal pain     Amenorrhea     Bacterial vaginosis     Breast pain     Daytime somnolence     Diarrhea     Disease of thyroid gland     Dysmenorrhea     Dysuria     Fibroids     Heavy menses     Hepatic steatosis     Hyperglycemia     Irregular menses     Low back pain     Lumbar spondylosis 7/30/2019    Menometrorrhagia     Nausea & vomiting     Obesity     Oligomenorrhea     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    Right shoulder pain     Uterine fibroid     Wears glasses       Past Surgical History:     Past Surgical History:   Procedure Laterality Date    ABDOMINAL ADHESION SURGERY N/A 5/19/2017    Procedure: LYSIS ADHESIONS;  Surgeon: Marilia Hallman DO;  [facility];  Service:     CYSTOSCOPY N/A 5/19/2017    Procedure: Mily Olivarez;  Surgeon: Marilia Hallman DO;  [facility];  Service:     CYSTOSCOPY      HYSTERECTOMY      NH COLONOSCOPY FLX DX W/COLLJ SPEC WHEN PFRMD N/A 4/24/2018    Procedure: COLONOSCOPY;  Surgeon: Tee Piña MD;  Location: [facility];   Service: General    NH LAPAROSCOPY W TOT HYSTERECTUTERUS <=250 [address] Counts TUBE/OVARY N/A 5/19/2017    Procedure: HYSTERECTOMY LAPAROSCOPIC TOTAL ,BILATERAL SALPINGECTOMY;  Surgeon: Marilia Hallman DO;  Location[facility];  Service: Gynecology      Social History:     Social History     Socioeconomic History    Marital status: /Civil Union     Spouse name: None    Number of children: None    Years of education: None    Highest education level: None   Occupational History    None   Social Needs    Financial resource strain: None    Food insecurity:     Worry: None     Inability: None    Transportation needs:     Medical: None     Non-medical: None   Tobacco Use    Smoking status: Never Smoker    Smokeless tobacco: Never Used   Substance and Sexual Activity    Alcohol use: No     Comment: social    Drug use: No    Sexual activity: Yes     Partners: Male     Birth control/protection: None   Lifestyle    Physical activity:     Days per week: None     Minutes per session: None    Stress: None   Relationships    Social connections:     Talks on phone: None     Gets together: None     Attends Jain service: None     Active member of club or organization: None     Attends meetings of clubs or organizations: None     Relationship status: None    Intimate partner violence:     Fear of current or ex partner: None     Emotionally abused: None     Physically abused: None     Forced sexual activity: None   Other Topics Concern    None   Social History Narrative    None      Family History:     Family History   Problem Relation Age of Onset    Diabetes Mother     Hypertension Mother     Hyperlipidemia Mother     No Known Problems Father     No Known Problems Brother       Current Medications:     Current Outpatient Medications   Medication Sig Dispense Refill    docusate sodium (COLACE) 100 mg capsule TAKE 1 CAPSULE BY MOUTH TWICE A DAY 60 capsule 3    DULoxetine (CYMBALTA) 30 mg delayed release capsule Take 1 capsule (30 mg total) by mouth daily 30 capsule 1    hydrOXYzine HCL (ATARAX) 25 mg tablet Take 1 tablet (25 mg total) by mouth daily at bedtime 30 tablet 1    ibuprofen (MOTRIN) 600 mg tablet Take 1 tablet (600 mg total) by mouth every 6 (six) hours as needed for moderate pain 20 tablet 0    levothyroxine 50 mcg tablet Take 1 tablet (50 mcg total) by mouth daily for 255 days 30 tablet 5    methocarbamol (ROBAXIN) 750 mg tablet Take 1 tablet (750 mg total) by mouth every 6 (six) hours as needed for muscle spasms 60 tablet 0    polyethylene glycol (GLYCOLAX) powder 2 tablespoons in 8 oz water daily 850 g 1     No current facility-administered medications for this visit  Allergies:     No Known Allergies   Physical Exam:     /80   Pulse 100   Temp (!) 97 2 °F (36 2 °C) (Temporal)   Resp 20   Ht 5' 6" (1 676 m)   Wt 94 6 kg (208 lb 9 6 oz)   LMP  (LMP Unknown)   SpO2 98%   BMI 33 67 kg/m²     Physical Exam   Constitutional: She is oriented to person, place, and time  She appears well-developed and well-nourished  No distress  HENT:   Head: Normocephalic and atraumatic  Right Ear: External ear normal    Left Ear: External ear normal    Nose: Nose normal    Mouth/Throat: Oropharynx is clear and moist    Eyes: Pupils are equal, round, and reactive to light  Conjunctivae and EOM are normal  Right eye exhibits no discharge  Left eye exhibits no discharge  Neck: Normal range of motion  Neck supple  No thyromegaly present  Cardiovascular: Normal rate, regular rhythm, normal heart sounds and intact distal pulses  Exam reveals no gallop and no friction rub  No murmur heard  Pulmonary/Chest: Effort normal and breath sounds normal  No respiratory distress  She has no wheezes  Abdominal: Soft  Bowel sounds are normal  She exhibits no distension  There is no tenderness  Musculoskeletal: Normal range of motion  She exhibits tenderness  Neurological: She is alert and oriented to person, place, and time  She has normal reflexes  Skin: Skin is warm and dry  Capillary refill takes less than 2 seconds  She is not diaphoretic  Psychiatric: She has a normal mood and affect   Her behavior is normal  Judgment and thought content normal    Nursing note and vitals reviewed        Sasha Snell, [id number]

## 2019-09-03 DIAGNOSIS — B35.4 TINEA CORPORIS: ICD-10-CM

## 2019-09-04 RX ORDER — CLOTRIMAZOLE AND BETAMETHASONE DIPROPIONATE 10; .64 MG/G; MG/G
CREAM TOPICAL
Qty: 30 G | Refills: 0 | OUTPATIENT
Start: 2019-09-04

## 2019-09-07 ENCOUNTER — HOSPITAL ENCOUNTER (OUTPATIENT)
Dept: MAMMOGRAPHY | Facility: CLINIC | Age: 44
Discharge: HOME | End: 2019-09-07
Payer: COMMERCIAL

## 2019-09-07 VITALS — BODY MASS INDEX: 33.43 KG/M2 | HEIGHT: 66 IN | WEIGHT: 208 LBS

## 2019-09-07 DIAGNOSIS — Z12.39 SCREENING FOR BREAST CANCER: ICD-10-CM

## 2019-09-07 PROCEDURE — 77067 SCR MAMMO BI INCL CAD: CPT

## 2019-09-09 DIAGNOSIS — R92.8 ABNORMAL MAMMOGRAM: Primary | ICD-10-CM

## 2019-10-22 ENCOUNTER — OFFICE VISIT (OUTPATIENT)
Dept: FAMILY MEDICINE CLINIC | Facility: CLINIC | Age: 44
End: 2019-10-22
Payer: COMMERCIAL

## 2019-10-22 VITALS
WEIGHT: 201.4 LBS | BODY MASS INDEX: 32.37 KG/M2 | SYSTOLIC BLOOD PRESSURE: 110 MMHG | DIASTOLIC BLOOD PRESSURE: 80 MMHG | TEMPERATURE: 98.2 F | OXYGEN SATURATION: 98 % | HEIGHT: 66 IN | RESPIRATION RATE: 28 BRPM | HEART RATE: 82 BPM

## 2019-10-22 DIAGNOSIS — F41.9 ANXIETY: ICD-10-CM

## 2019-10-22 DIAGNOSIS — R06.02 SHORTNESS OF BREATH: ICD-10-CM

## 2019-10-22 DIAGNOSIS — J01.00 ACUTE NON-RECURRENT MAXILLARY SINUSITIS: Primary | ICD-10-CM

## 2019-10-22 DIAGNOSIS — Z23 NEEDS FLU SHOT: ICD-10-CM

## 2019-10-22 DIAGNOSIS — R05.9 COUGH: ICD-10-CM

## 2019-10-22 PROCEDURE — 3008F BODY MASS INDEX DOCD: CPT | Performed by: NURSE PRACTITIONER

## 2019-10-22 PROCEDURE — 99214 OFFICE O/P EST MOD 30 MIN: CPT | Performed by: NURSE PRACTITIONER

## 2019-10-22 RX ORDER — ALPRAZOLAM 0.25 MG/1
0.25 TABLET ORAL 2 TIMES DAILY PRN
Qty: 14 TABLET | Refills: 0 | Status: SHIPPED | OUTPATIENT
Start: 2019-10-22 | End: 2020-05-06

## 2019-10-22 RX ORDER — BENZONATATE 200 MG/1
200 CAPSULE ORAL 3 TIMES DAILY PRN
Qty: 20 CAPSULE | Refills: 0 | Status: SHIPPED | OUTPATIENT
Start: 2019-10-22 | End: 2020-05-06

## 2019-10-22 RX ORDER — FLUTICASONE PROPIONATE 50 MCG
2 SPRAY, SUSPENSION (ML) NASAL DAILY
Qty: 16 G | Refills: 0 | Status: SHIPPED | OUTPATIENT
Start: 2019-10-22 | End: 2020-05-06

## 2019-10-22 NOTE — PROGRESS NOTES
Assessment/Plan:    Anxiety  I explained to patient treatment usually consists of a combination of pharmacotherapy and/or psychotherapy  Antidepressant agents are the drugs of choice  I also advised patient that joining a gym and keeping busy will help with her depression/anxiety and keeping mind off of things  Also would benefit from support group and therapy session  Today I am starting on short term Xanax 0 25mg BID x 6 days  I am referring patient to pscyhologist as patient would benefit from weekly therapy sessions  Acute non-recurrent maxillary sinusitis  I am prescribing an antibiotic along with Flonase nasal spray  Pt recommended to use neti pot to help with sinus pressure  Also recommended use of humidifier at night to ease with breathing  recommend analgesics, topical intranasal steroids, and/or nasal saline irrigation for symptomatic relief of viral rhinosinusitis  Patient to return to clinic if s/s do not improve  Shortness of breath  -Ordering Chest xray and PFT test as patient states she is asthmatic  I think this is more related to anxiety she is having from recent death in family  Diagnoses and all orders for this visit:    Acute non-recurrent maxillary sinusitis  -     fluticasone (FLONASE) 50 mcg/act nasal spray; 2 sprays into each nostril daily    Shortness of breath  -     XR chest pa & lateral; Future  -     Complete PFT with post bronchodilator; Future    Anxiety  -     ALPRAZolam (XANAX) 0 25 mg tablet; Take 1 tablet (0 25 mg total) by mouth 2 (two) times a day as needed for anxiety for up to 7 days    Needs flu shot  -     influenza vaccine, [id number], quadrivalent, 0 5 mL, preservative-free, for adult and pediatric patients 6 mos+ (AFLURIA, FLUARIX, FLULAVAL, FLUZONE)    Cough  -     benzonatate (TESSALON) 200 MG capsule; Take 1 capsule (200 mg total) by mouth 3 (three) times a day as needed for cough          Subjective:      Patient ID: Lucy Glaser is a 44 y o  female  44year old female patient states that last week a lot of people were sick  They had fever and vomiting  States she suffers from sinus infections  Patient also states that her brother was killed in the parking lot of his job by his ex wifes new boyfriend about 7 months ago  Patient is still very nervous and anxious about his and is currently using atarax and cymbalta with little relief  Pt has not seen [facility] doctor as she has not had the time to do so due to work  Shortness of Breath   This is a recurrent problem  The current episode started in the past 7 days (wednesday)  The problem occurs constantly  The problem has been gradually worsening  Associated symptoms include headaches and a sore throat  Pertinent negatives include no ear pain, fever, orthopnea, sputum production or swollen glands  The symptoms are aggravated by any activity, exercise, weather changes and URIs  Associated symptoms comments: cough  The patient has no known risk factors (2nd hand smoke) for DVT/PE  She has tried cool air for the symptoms  Her past medical history is significant for allergies and asthma  There is no history of bronchiolitis, CAD, DVT, PE, pneumonia or a recent surgery  Sinusitis   This is a recurrent problem  The current episode started today  The problem has been gradually worsening since onset  There has been no fever  The fever has been present for less than 1 day  Associated symptoms include congestion, coughing, headaches, shortness of breath, sinus pressure and a sore throat  Pertinent negatives include no ear pain or swollen glands  Past treatments include saline sprays  The treatment provided no relief  Anxiety   Presents for follow-up visit  Symptoms include dry mouth, feeling of choking, insomnia, nervous/anxious behavior, restlessness and shortness of breath  Patient reports no suicidal ideas  Her past medical history is significant for asthma  There is no history of CAD         The following portions of the patient's history were reviewed and updated as appropriate: allergies, current medications, past family history, past medical history, past social history, past surgical history and problem list     Review of Systems   Constitutional: Negative  Negative for fever  HENT: Positive for congestion, sinus pressure and sore throat  Negative for ear pain  Eyes: Negative  Respiratory: Positive for cough and shortness of breath  Negative for sputum production  Cardiovascular: Negative  Negative for orthopnea  Gastrointestinal: Negative  Endocrine: Negative  Genitourinary: Negative  Musculoskeletal: Negative  Skin: Negative  Allergic/Immunologic: Negative  Neurological: Positive for headaches  Hematological: Negative  Psychiatric/Behavioral: Negative for suicidal ideas  The patient is nervous/anxious and has insomnia  Objective:      /80 (BP Location: Left arm, Patient Position: Sitting, Cuff Size: Adult)   Pulse 82   Temp 98 2 °F (36 8 °C) (Oral)   Resp (!) 28   Ht 5' 6" (1 676 m)   Wt 91 4 kg (201 lb 6 4 oz)   LMP  (LMP Unknown)   SpO2 98%   BMI 32 51 kg/m²          Physical Exam   Constitutional: She is oriented to person, place, and time  She appears well-developed and well-nourished  No distress  HENT:   Head: Normocephalic and atraumatic  Right Ear: External ear normal    Left Ear: External ear normal    Eyes: Pupils are equal, round, and reactive to light  EOM are normal    Neck: Normal range of motion  Neck supple  Cardiovascular: Normal rate, regular rhythm and normal heart sounds  Pulmonary/Chest: Effort normal and breath sounds normal  No respiratory distress  She has no wheezes  She has no rales  Abdominal: Soft  Bowel sounds are normal    Musculoskeletal: Normal range of motion  Lymphadenopathy:     She has no cervical adenopathy  Neurological: She is alert and oriented to person, place, and time   She has normal reflexes  Skin: Skin is warm and dry  Capillary refill takes less than 2 seconds  She is not diaphoretic  Psychiatric: Her speech is normal and behavior is normal  Judgment and thought content normal  Her mood appears anxious  Cognition and memory are normal    Nursing note and vitals reviewed

## 2019-10-22 NOTE — LETTER
October 22, 2019     Patient: Huey Davis   YOB: 1975   Date of Visit: 10/22/2019       To Whom it May Concern:    Huey Davis is under my professional care  She was seen in my office on 10/22/2019  She may return to work on 10/29/19, please excuse from 10/16/19 onto 10/29/19       If you have any questions or concerns, please don't hesitate to call           Sincerely,          MARIA ALEJANDRA Pang        CC: No Recipients

## 2019-11-07 ENCOUNTER — HOSPITAL ENCOUNTER (OUTPATIENT)
Dept: ULTRASOUND IMAGING | Facility: CLINIC | Age: 44
Discharge: HOME | End: 2019-11-07
Payer: COMMERCIAL

## 2019-11-07 ENCOUNTER — HOSPITAL ENCOUNTER (OUTPATIENT)
Dept: MAMMOGRAPHY | Facility: CLINIC | Age: 44
Discharge: HOME | End: 2019-11-07
Payer: COMMERCIAL

## 2019-11-07 VITALS — HEIGHT: 66 IN | BODY MASS INDEX: 32.3 KG/M2 | WEIGHT: 201 LBS

## 2019-11-07 DIAGNOSIS — R92.8 ABNORMAL MAMMOGRAM: ICD-10-CM

## 2019-11-07 PROCEDURE — G0279 TOMOSYNTHESIS, MAMMO: HCPCS

## 2019-11-07 PROCEDURE — 77065 DX MAMMO INCL CAD UNI: CPT

## 2019-11-22 NOTE — ASSESSMENT & PLAN NOTE
-Ordering Chest xray and PFT test as patient states she is asthmatic  I think this is more related to anxiety she is having from recent death in family 
I am prescribing an antibiotic along with Flonase nasal spray  Pt recommended to use neti pot to help with sinus pressure  Also recommended use of humidifier at night to ease with breathing  recommend analgesics, topical intranasal steroids, and/or nasal saline irrigation for symptomatic relief of viral rhinosinusitis  Patient to return to clinic if s/s do not improve 
I explained to patient treatment usually consists of a combination of pharmacotherapy and/or psychotherapy  Antidepressant agents are the drugs of choice  I also advised patient that joining a gym and keeping busy will help with her depression/anxiety and keeping mind off of things  Also would benefit from support group and therapy session  Today I am starting on short term Xanax 0 25mg BID x 6 days  I am referring patient to pscyhologist as patient would benefit from weekly therapy sessions 
Valerie Sesay [phone]

## 2020-02-15 ENCOUNTER — OFFICE VISIT (OUTPATIENT)
Dept: URGENT CARE | Age: 45
End: 2020-02-15
Payer: COMMERCIAL

## 2020-02-15 VITALS
HEIGHT: 66 IN | TEMPERATURE: 98.2 F | BODY MASS INDEX: 32.3 KG/M2 | OXYGEN SATURATION: 98 % | SYSTOLIC BLOOD PRESSURE: 106 MMHG | HEART RATE: 94 BPM | WEIGHT: 201 LBS | RESPIRATION RATE: 20 BRPM | DIASTOLIC BLOOD PRESSURE: 74 MMHG

## 2020-02-15 DIAGNOSIS — J01.41 ACUTE RECURRENT PANSINUSITIS: Primary | ICD-10-CM

## 2020-02-15 PROCEDURE — 99213 OFFICE O/P EST LOW 20 MIN: CPT | Performed by: PHYSICIAN ASSISTANT

## 2020-02-15 RX ORDER — DULOXETIN HYDROCHLORIDE 60 MG/1
60 CAPSULE, DELAYED RELEASE ORAL DAILY
COMMUNITY
Start: 2019-01-31

## 2020-02-15 RX ORDER — ALBUTEROL SULFATE 90 UG/1
2 AEROSOL, METERED RESPIRATORY (INHALATION) EVERY 6 HOURS PRN
Qty: 1 INHALER | Refills: 0 | Status: SHIPPED | OUTPATIENT
Start: 2020-02-15

## 2020-02-15 RX ORDER — PREDNISONE 50 MG/1
50 TABLET ORAL DAILY
Qty: 5 TABLET | Refills: 0 | Status: SHIPPED | OUTPATIENT
Start: 2020-02-15 | End: 2020-02-20

## 2020-02-15 RX ORDER — ALBUTEROL SULFATE 2.5 MG/3ML
2.5 SOLUTION RESPIRATORY (INHALATION) EVERY 4 HOURS PRN
Qty: 60 VIAL | Refills: 0 | Status: SHIPPED | OUTPATIENT
Start: 2020-02-15

## 2020-02-15 RX ORDER — FLUTICASONE PROPIONATE 50 MCG
2 SPRAY, SUSPENSION (ML) NASAL DAILY
Qty: 16 G | Refills: 0 | Status: SHIPPED | OUTPATIENT
Start: 2020-02-15

## 2020-02-15 RX ORDER — CETIRIZINE HYDROCHLORIDE 10 MG/1
10 TABLET ORAL DAILY
Qty: 30 TABLET | Refills: 0 | Status: SHIPPED | OUTPATIENT
Start: 2020-02-15

## 2020-02-15 RX ORDER — AMOXICILLIN AND CLAVULANATE POTASSIUM 875; 125 MG/1; MG/1
1 TABLET, FILM COATED ORAL EVERY 12 HOURS SCHEDULED
Qty: 14 TABLET | Refills: 0 | Status: SHIPPED | OUTPATIENT
Start: 2020-02-15 | End: 2020-02-22

## 2020-02-15 RX ORDER — BENZONATATE 100 MG/1
100 CAPSULE ORAL 3 TIMES DAILY PRN
Qty: 15 CAPSULE | Refills: 0 | Status: SHIPPED | OUTPATIENT
Start: 2020-02-15 | End: 2020-05-06

## 2020-02-15 NOTE — PROGRESS NOTES
[facility]        NAME: Reina Jackson is a 38 y o  female  : 1975    MRN: [id number]  DATE: February 15, 2020  TIME: 6:21 PM    Assessment and Plan   Acute recurrent pansinusitis [J01 41]  1  Acute recurrent pansinusitis  amoxicillin-clavulanate (AUGMENTIN) 875-125 mg per tablet    albuterol (PROVENTIL HFA,VENTOLIN HFA) 90 mcg/act inhaler    albuterol (2 5 mg/3 mL) 0 083 % nebulizer solution    predniSONE 50 mg tablet    fluticasone (FLONASE) 50 mcg/act nasal spray    cetirizine (ZyrTEC) 10 mg tablet    benzonatate (TESSALON PERLES) 100 mg capsule         Patient Instructions     Take antibiotic as directed until completed  Take prednisone as directed until completed  Use additional medications as directed for symptomatic relief as needed  Motrin and/or Tylenol as needed for fevers aches and pains  Drink plenty of fluids stay well hydrated  Follow up with PCP in 3-5 days  Proceed to  ER if symptoms worsen  Chief Complaint     Chief Complaint   Patient presents with    Cold Like Symptoms     Symptoms present for 2 weeks, cough noted, headahce, chest discomfort when coughing, SOB, sinus congestion and runny nose  No flu vac this year  No recent travel  History of Present Illness       67-year-old female presents with 2 week history of sinus pain congestion runny nose cough and chest tightness  Patient does have history of asthma  Denies any fevers or chills  No abdominal pain nausea vomiting diarrhea  Denies any chest pain or congestion  No ear pain reported  Denies any rashes  Has been taking over-the-counter remedies without any relief    Sinusitis   This is a new problem  The current episode started 1 to 4 weeks ago  The problem has been gradually worsening since onset  There has been no fever  The pain is moderate  Associated symptoms include congestion, coughing, headaches, shortness of breath, sinus pressure and a sore throat   Past treatments include acetaminophen, lying down, oral decongestants, sitting up, spray decongestants and saline sprays  The treatment provided no relief  Review of Systems   Review of Systems   Constitutional: Negative  Negative for fever  HENT: Positive for congestion, postnasal drip, rhinorrhea, sinus pressure, sinus pain and sore throat  Eyes: Negative  Respiratory: Positive for cough and shortness of breath  Cardiovascular: Negative  Gastrointestinal: Negative  Musculoskeletal: Negative  Skin: Negative  Neurological: Positive for headaches           Current Medications       Current Outpatient Medications:     DULoxetine (CYMBALTA) 60 mg delayed release capsule, Take 60 mg by mouth daily, Disp: , Rfl:     fluticasone (FLONASE) 50 mcg/act nasal spray, 2 sprays into each nostril daily, Disp: 16 g, Rfl: 0    hydrOXYzine HCL (ATARAX) 25 mg tablet, Take 1 tablet (25 mg total) by mouth daily at bedtime, Disp: 30 tablet, Rfl: 1    ibuprofen (MOTRIN) 600 mg tablet, Take 1 tablet (600 mg total) by mouth every 6 (six) hours as needed for moderate pain, Disp: 20 tablet, Rfl: 0    levothyroxine 50 mcg tablet, Take 1 tablet (50 mcg total) by mouth daily for 255 days, Disp: 30 tablet, Rfl: 5    methocarbamol (ROBAXIN) 750 mg tablet, Take 1 tablet (750 mg total) by mouth every 6 (six) hours as needed for muscle spasms, Disp: 60 tablet, Rfl: 0    albuterol (2 5 mg/3 mL) 0 083 % nebulizer solution, Take 1 vial (2 5 mg total) by nebulization every 4 (four) hours as needed for wheezing or shortness of breath, Disp: 60 vial, Rfl: 0    albuterol (PROVENTIL HFA,VENTOLIN HFA) 90 mcg/act inhaler, Inhale 2 puffs every 6 (six) hours as needed for wheezing, Disp: 1 Inhaler, Rfl: 0    ALPRAZolam (XANAX) 0 25 mg tablet, Take 1 tablet (0 25 mg total) by mouth 2 (two) times a day as needed for anxiety for up to 7 days, Disp: 14 tablet, Rfl: 0    amoxicillin-clavulanate (AUGMENTIN) 875-125 mg per tablet, Take 1 tablet by mouth every 12 (twelve) hours for 7 days, Disp: 14 tablet, Rfl: 0    benzonatate (TESSALON PERLES) 100 mg capsule, Take 1 capsule (100 mg total) by mouth 3 (three) times a day as needed for cough, Disp: 15 capsule, Rfl: 0    benzonatate (TESSALON) 200 MG capsule, Take 1 capsule (200 mg total) by mouth 3 (three) times a day as needed for cough (Patient not taking: Reported on 2/15/2020), Disp: 20 capsule, Rfl: 0    cetirizine (ZyrTEC) 10 mg tablet, Take 1 tablet (10 mg total) by mouth daily, Disp: 30 tablet, Rfl: 0    docusate sodium (COLACE) 100 mg capsule, TAKE 1 CAPSULE BY MOUTH TWICE A DAY (Patient not taking: Reported on 10/22/2019), Disp: 60 capsule, Rfl: 3    DULoxetine (CYMBALTA) 30 mg delayed release capsule, Take 1 capsule (30 mg total) by mouth daily (Patient not taking: Reported on 2/15/2020), Disp: 30 capsule, Rfl: 1    fluticasone (FLONASE) 50 mcg/act nasal spray, 2 sprays into each nostril daily, Disp: 16 g, Rfl: 0    polyethylene glycol (GLYCOLAX) powder, 2 tablespoons in 8 oz water daily (Patient not taking: Reported on 10/22/2019), Disp: 850 g, Rfl: 1    predniSONE 50 mg tablet, Take 1 tablet (50 mg total) by mouth daily for 5 days, Disp: 5 tablet, Rfl: 0    Current Allergies     Allergies as of 02/15/2020    (No Known Allergies)            The following portions of the patient's history were reviewed and updated as appropriate: allergies, current medications, past family history, past medical history, past social history, past surgical history and problem list      Past Medical History:   Diagnosis Date    Abdominal pain     Amenorrhea     Bacterial vaginosis     Breast pain     Daytime somnolence     Diarrhea     Disease of thyroid gland     Dysmenorrhea     Dysuria     Fibroids     Heavy menses     Hepatic steatosis     Hyperglycemia     Irregular menses     Low back pain     Lumbar spondylosis 7/30/2019    Menometrorrhagia     Nausea & vomiting     Obesity     Oligomenorrhea     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    Right shoulder pain     Uterine fibroid     Wears glasses        Past Surgical History:   Procedure Laterality Date    ABDOMINAL ADHESION SURGERY N/A 5/19/2017    Procedure: LYSIS ADHESIONS;  Surgeon: Earnest Encarnacion DO;  [facility];  Service:    Vamsi Rey N/A 5/19/2017    Procedure: Yvone Collie;  Surgeon: Earnest Encarnacion DO;  [facility];  Service:    Berdine Ok HYSTERECTOMY  2017    NM COLONOSCOPY FLX DX W/COLLJ SPEC WHEN PFRMD N/A 4/24/2018    Procedure: COLONOSCOPY;  Surgeon: Rebeca Alford MD;  [facility]; Service: General    NM LAPAROSCOPY W TOT HYSTERECTUTERUS <=250 GRAM  W TUBE/OVARY N/A 5/19/2017    Procedure: HYSTERECTOMY LAPAROSCOPIC TOTAL ,BILATERAL SALPINGECTOMY;  Surgeon: Earnest Encarnacion DO;  [facility];  Service: Gynecology       Family History   Problem Relation Age of Onset    Diabetes Mother     Hypertension Mother     Hyperlipidemia Mother     No Known Problems Father     No Known Problems Brother     No Known Problems Daughter     Throat cancer Maternal Grandmother     Stomach cancer Paternal Grandmother     No Known Problems Daughter     No Known Problems Daughter          Medications have been verified  Objective   /74   Pulse 94   Temp 98 2 °F (36 8 °C) (Tympanic)   Resp 20   Ht 5' 6" (1 676 m)   Wt 91 2 kg (201 lb)   LMP  (LMP Unknown)   SpO2 98%   BMI 32 44 kg/m²        Physical Exam     Physical Exam   Constitutional: She is oriented to person, place, and time  She appears well-developed and well-nourished  No distress  HENT:   Head: Normocephalic and atraumatic  Right Ear: Hearing, tympanic membrane, external ear and ear canal normal    Left Ear: Hearing, tympanic membrane, external ear and ear canal normal    Nose: Right sinus exhibits maxillary sinus tenderness and frontal sinus tenderness   Left sinus exhibits maxillary sinus tenderness and frontal sinus tenderness  Mouth/Throat: Uvula is midline, oropharynx is clear and moist and mucous membranes are normal  No oropharyngeal exudate  Sinuses tender with percussion   Eyes: Conjunctivae and EOM are normal  Right eye exhibits no discharge  Left eye exhibits no discharge  Neck: Normal range of motion  Neck supple  Cardiovascular: Normal rate, regular rhythm, normal heart sounds and intact distal pulses  No murmur heard  Pulmonary/Chest: Effort normal  No respiratory distress  She has decreased breath sounds (Mild throughout)  She has no wheezes  She has no rhonchi  She has no rales  Abdominal: Soft  Bowel sounds are normal  There is no tenderness  Musculoskeletal: Normal range of motion  Lymphadenopathy:     She has no cervical adenopathy  Neurological: She is alert and oriented to person, place, and time  Skin: Skin is warm and dry  Psychiatric: She has a normal mood and affect  Nursing note and vitals reviewed

## 2020-02-15 NOTE — PATIENT INSTRUCTIONS
Take antibiotic as directed until completed  Take prednisone as directed until completed  Use additional medications as directed for symptomatic relief as needed  Motrin and/or Tylenol as needed for fevers aches and pains  Drink plenty of fluids stay well hydrated  Follow up with PCP in 3-5 days  Proceed to  ER if symptoms worsen  Sinusitis   AMBULATORY CARE:   Sinusitis  is inflammation or infection of your sinuses  It is most often caused by a virus  Acute sinusitis may last up to 12 weeks  Chronic sinusitis lasts longer than 12 weeks  Recurrent sinusitis means you have 4 or more times in 1 year  Common symptoms include the following:   · Fever    · Pain, pressure, redness, or swelling around the forehead, cheeks, or eyes    · Thick yellow or green discharge from your nose    · Tenderness when you touch your face over your sinuses    · Dry cough that happens mostly at night or when you lie down    · Headache and face pain that is worse when you lean forward    · Tooth pain, or pain when you chew  Seek care immediately if:   · Your eye and eyelid are red, swollen, and painful  · You cannot open your eye  · You have vision changes, such as double vision  · Your eyeball bulges out or you cannot move your eye  · You are more sleepy than normal, or you notice changes in your ability to think, move, or talk  · You have a stiff neck, a fever, or a bad headache  · You have swelling of your forehead or scalp  Contact your healthcare provider if:   · Your symptoms do not improve after 3 days  · Your symptoms do not go away after 10 days  · You have nausea and are vomiting  · Your nose is bleeding  · You have questions or concerns about your condition or care  Treatment for sinusitis:  Your symptoms may go away on their own  Your healthcare provider may recommend watchful waiting for up to 10 days before starting antibiotics   You may  need any of the following:  · Acetaminophen decreases pain and fever  It is available without a doctor's order  Ask how much to take and how often to take it  Follow directions  Read the labels of all other medicines you are using to see if they also contain acetaminophen, or ask your doctor or pharmacist  Acetaminophen can cause liver damage if not taken correctly  Do not use more than 4 grams (4,000 milligrams) total of acetaminophen in one day  · NSAIDs , such as ibuprofen, help decrease swelling, pain, and fever  This medicine is available with or without a doctor's order  NSAIDs can cause stomach bleeding or kidney problems in certain people  If you take blood thinner medicine, always ask your healthcare provider if NSAIDs are safe for you  Always read the medicine label and follow directions  · Nasal steroid sprays  may help decrease inflammation in your nose and sinuses  · Decongestants  help reduce swelling and drain mucus in the nose and sinuses  They may help you breathe easier  · Antihistamines  help dry mucus in the nose and relieve sneezing  · Antibiotics  help treat or prevent a bacterial infection  · Take your medicine as directed  Contact your healthcare provider if you think your medicine is not helping or if you have side effects  Tell him or her if you are allergic to any medicine  Keep a list of the medicines, vitamins, and herbs you take  Include the amounts, and when and why you take them  Bring the list or the pill bottles to follow-up visits  Carry your medicine list with you in case of an emergency  Self-care:   · Rinse your sinuses  Use a sinus rinse device to rinse your nasal passages with a saline (salt water) solution or distilled water  Do not use tap water  This will help thin the mucus in your nose and rinse away pollen and dirt  It will also help reduce swelling so you can breathe normally  Ask your healthcare provider how often to do this       · Breathe in steam   Heat a bowl of water until you see steam  Lean over the bowl and make a tent over your head with a large towel  Breathe deeply for about 20 minutes  Be careful not to get too close to the steam or burn yourself  Do this 3 times a day  You can also breathe deeply when you take a hot shower  · Sleep with your head elevated  Place an extra pillow under your head before you go to sleep to help your sinuses drain  · Drink liquids as directed  Ask your healthcare provider how much liquid to drink each day and which liquids are best for you  Liquids will thin the mucus in your nose and help it drain  Avoid drinks that contain alcohol or caffeine  · Do not smoke, and avoid secondhand smoke  Nicotine and other chemicals in cigarettes and cigars can make your symptoms worse  Ask your healthcare provider for information if you currently smoke and need help to quit  E-cigarettes or smokeless tobacco still contain nicotine  Talk to your healthcare provider before you use these products  Prevent the spread of germs that cause sinusitis:  Wash your hands often with soap and water  Wash your hands after you use the bathroom, change a child's diaper, or sneeze  Wash your hands before you prepare or eat food  Follow up with your healthcare provider as directed: You may be referred to an ear, nose, and throat specialist  Write down your questions so you remember to ask them during your visits  © 2017 [employer] Information is for End User's use only and may not be sold, redistributed or otherwise used for commercial purposes  All illustrations and images included in CareNotes® are the copyrighted property of A D A M [employer] Inc  or Markie Rocha  The above information is an  only  It is not intended as medical advice for individual conditions or treatments  Talk to your doctor, nurse or pharmacist before following any medical regimen to see if it is safe and effective for you

## 2020-04-02 ENCOUNTER — APPOINTMENT (OUTPATIENT)
Dept: RADIOLOGY | Age: 45
End: 2020-04-02
Payer: OTHER MISCELLANEOUS

## 2020-04-02 ENCOUNTER — OCCMED (OUTPATIENT)
Dept: URGENT CARE | Age: 45
End: 2020-04-02

## 2020-04-02 DIAGNOSIS — M79.601 RIGHT ARM PAIN: ICD-10-CM

## 2020-04-02 DIAGNOSIS — M79.601 RIGHT ARM PAIN: Primary | ICD-10-CM

## 2020-04-02 PROCEDURE — 99213 OFFICE O/P EST LOW 20 MIN: CPT

## 2020-04-02 PROCEDURE — 73090 X-RAY EXAM OF FOREARM: CPT

## 2020-04-02 PROCEDURE — 73110 X-RAY EXAM OF WRIST: CPT

## 2020-04-07 ENCOUNTER — TELEPHONE (OUTPATIENT)
Dept: OBGYN CLINIC | Facility: HOSPITAL | Age: 45
End: 2020-04-07

## 2020-04-08 ENCOUNTER — OFFICE VISIT (OUTPATIENT)
Dept: OBGYN CLINIC | Facility: MEDICAL CENTER | Age: 45
End: 2020-04-08
Payer: OTHER MISCELLANEOUS

## 2020-04-08 ENCOUNTER — TELEPHONE (OUTPATIENT)
Dept: OBGYN CLINIC | Facility: HOSPITAL | Age: 45
End: 2020-04-08

## 2020-04-08 DIAGNOSIS — M65.4 RADIAL STYLOID TENOSYNOVITIS (DE QUERVAIN): Primary | ICD-10-CM

## 2020-04-08 PROCEDURE — 99214 OFFICE O/P EST MOD 30 MIN: CPT | Performed by: ORTHOPAEDIC SURGERY

## 2020-04-09 ENCOUNTER — TELEPHONE (OUTPATIENT)
Dept: OBGYN CLINIC | Facility: HOSPITAL | Age: 45
End: 2020-04-09

## 2020-05-06 ENCOUNTER — HOSPITAL ENCOUNTER (EMERGENCY)
Facility: HOSPITAL | Age: 45
Discharge: HOME | End: 2020-05-06
Attending: EMERGENCY MEDICINE
Payer: COMMERCIAL

## 2020-05-06 ENCOUNTER — APPOINTMENT (EMERGENCY)
Dept: RADIOLOGY | Facility: HOSPITAL | Age: 45
End: 2020-05-06
Payer: COMMERCIAL

## 2020-05-06 VITALS
DIASTOLIC BLOOD PRESSURE: 69 MMHG | OXYGEN SATURATION: 100 % | SYSTOLIC BLOOD PRESSURE: 123 MMHG | WEIGHT: 209.44 LBS | BODY MASS INDEX: 33.8 KG/M2 | HEART RATE: 63 BPM | RESPIRATION RATE: 20 BRPM | TEMPERATURE: 98.3 F

## 2020-05-06 DIAGNOSIS — R07.89 CHEST WALL PAIN: Primary | ICD-10-CM

## 2020-05-06 DIAGNOSIS — M62.838 MUSCLE SPASM: ICD-10-CM

## 2020-05-06 LAB
ALBUMIN SERPL BCP-MCNC: 3.1 G/DL (ref 3.5–5)
ALP SERPL-CCNC: 72 U/L (ref 46–116)
ALT SERPL W P-5'-P-CCNC: 48 U/L (ref 12–78)
ANION GAP SERPL CALCULATED.3IONS-SCNC: 6 MMOL/L (ref 4–13)
AST SERPL W P-5'-P-CCNC: 51 U/L (ref 5–45)
ATRIAL RATE: 73 BPM
BASOPHILS # BLD AUTO: 0.03 THOUSANDS/ΜL (ref 0–0.1)
BASOPHILS NFR BLD AUTO: 1 % (ref 0–1)
BILIRUB SERPL-MCNC: 0.41 MG/DL (ref 0.2–1)
BUN SERPL-MCNC: 11 MG/DL (ref 5–25)
CALCIUM SERPL-MCNC: 8.5 MG/DL (ref 8.3–10.1)
CHLORIDE SERPL-SCNC: 103 MMOL/L (ref 100–108)
CO2 SERPL-SCNC: 26 MMOL/L (ref 21–32)
CREAT SERPL-MCNC: 0.87 MG/DL (ref 0.6–1.3)
EOSINOPHIL # BLD AUTO: 0.08 THOUSAND/ΜL (ref 0–0.61)
EOSINOPHIL NFR BLD AUTO: 1 % (ref 0–6)
ERYTHROCYTE [DISTWIDTH] IN BLOOD BY AUTOMATED COUNT: 11.9 % (ref 11.6–15.1)
GFR SERPL CREATININE-BSD FRML MDRD: 82 ML/MIN/1.73SQ M
GLUCOSE SERPL-MCNC: 141 MG/DL (ref 65–140)
HCT VFR BLD AUTO: 41.3 % (ref 34.8–46.1)
HGB BLD-MCNC: 13.4 G/DL (ref 11.5–15.4)
IMM GRANULOCYTES # BLD AUTO: 0.01 THOUSAND/UL (ref 0–0.2)
IMM GRANULOCYTES NFR BLD AUTO: 0 % (ref 0–2)
LYMPHOCYTES # BLD AUTO: 1.3 THOUSANDS/ΜL (ref 0.6–4.47)
LYMPHOCYTES NFR BLD AUTO: 21 % (ref 14–44)
MCH RBC QN AUTO: 32.1 PG (ref 26.8–34.3)
MCHC RBC AUTO-ENTMCNC: 32.4 G/DL (ref 31.4–37.4)
MCV RBC AUTO: 99 FL (ref 82–98)
MONOCYTES # BLD AUTO: 0.29 THOUSAND/ΜL (ref 0.17–1.22)
MONOCYTES NFR BLD AUTO: 5 % (ref 4–12)
NEUTROPHILS # BLD AUTO: 4.57 THOUSANDS/ΜL (ref 1.85–7.62)
NEUTS SEG NFR BLD AUTO: 72 % (ref 43–75)
NRBC BLD AUTO-RTO: 0 /100 WBCS
P AXIS: 58 DEGREES
PLATELET # BLD AUTO: 228 THOUSANDS/UL (ref 149–390)
PMV BLD AUTO: 10.7 FL (ref 8.9–12.7)
POTASSIUM SERPL-SCNC: 4.8 MMOL/L (ref 3.5–5.3)
PR INTERVAL: 134 MS
PROT SERPL-MCNC: 7 G/DL (ref 6.4–8.2)
QRS AXIS: 39 DEGREES
QRSD INTERVAL: 84 MS
QT INTERVAL: 382 MS
QTC INTERVAL: 420 MS
RBC # BLD AUTO: 4.17 MILLION/UL (ref 3.81–5.12)
SODIUM SERPL-SCNC: 135 MMOL/L (ref 136–145)
T WAVE AXIS: 49 DEGREES
TROPONIN I SERPL-MCNC: <0.02 NG/ML
VENTRICULAR RATE: 73 BPM
WBC # BLD AUTO: 6.28 THOUSAND/UL (ref 4.31–10.16)

## 2020-05-06 PROCEDURE — 99285 EMERGENCY DEPT VISIT HI MDM: CPT | Performed by: EMERGENCY MEDICINE

## 2020-05-06 PROCEDURE — 85025 COMPLETE CBC W/AUTO DIFF WBC: CPT | Performed by: EMERGENCY MEDICINE

## 2020-05-06 PROCEDURE — 96374 THER/PROPH/DIAG INJ IV PUSH: CPT

## 2020-05-06 PROCEDURE — 93010 ELECTROCARDIOGRAM REPORT: CPT | Performed by: INTERNAL MEDICINE

## 2020-05-06 PROCEDURE — 84484 ASSAY OF TROPONIN QUANT: CPT | Performed by: EMERGENCY MEDICINE

## 2020-05-06 PROCEDURE — 96361 HYDRATE IV INFUSION ADD-ON: CPT

## 2020-05-06 PROCEDURE — 99285 EMERGENCY DEPT VISIT HI MDM: CPT

## 2020-05-06 PROCEDURE — 93005 ELECTROCARDIOGRAM TRACING: CPT

## 2020-05-06 PROCEDURE — 36415 COLL VENOUS BLD VENIPUNCTURE: CPT | Performed by: EMERGENCY MEDICINE

## 2020-05-06 PROCEDURE — 80053 COMPREHEN METABOLIC PANEL: CPT | Performed by: EMERGENCY MEDICINE

## 2020-05-06 PROCEDURE — 71045 X-RAY EXAM CHEST 1 VIEW: CPT

## 2020-05-06 RX ORDER — METHOCARBAMOL 500 MG/1
500 TABLET, FILM COATED ORAL ONCE
Status: COMPLETED | OUTPATIENT
Start: 2020-05-06 | End: 2020-05-06

## 2020-05-06 RX ORDER — NAPROXEN 500 MG/1
500 TABLET ORAL 2 TIMES DAILY WITH MEALS
Qty: 30 TABLET | Refills: 0 | Status: SHIPPED | OUTPATIENT
Start: 2020-05-06

## 2020-05-06 RX ORDER — DIAZEPAM 5 MG/1
5 TABLET ORAL 2 TIMES DAILY
Qty: 10 TABLET | Refills: 0 | Status: SHIPPED | OUTPATIENT
Start: 2020-05-06 | End: 2020-05-11

## 2020-05-06 RX ORDER — KETOROLAC TROMETHAMINE 30 MG/ML
30 INJECTION, SOLUTION INTRAMUSCULAR; INTRAVENOUS ONCE
Status: COMPLETED | OUTPATIENT
Start: 2020-05-06 | End: 2020-05-06

## 2020-05-06 RX ORDER — METHOCARBAMOL 500 MG/1
500 TABLET, FILM COATED ORAL 2 TIMES DAILY
Qty: 20 TABLET | Refills: 0 | Status: SHIPPED | OUTPATIENT
Start: 2020-05-06

## 2020-05-06 RX ADMIN — METHOCARBAMOL TABLETS 500 MG: 500 TABLET, COATED ORAL at 12:36

## 2020-05-06 RX ADMIN — KETOROLAC TROMETHAMINE 30 MG: 30 INJECTION, SOLUTION INTRAMUSCULAR at 12:42

## 2020-05-06 RX ADMIN — SODIUM CHLORIDE 1000 ML: 0.9 INJECTION, SOLUTION INTRAVENOUS at 12:41

## 2020-05-08 ENCOUNTER — OFFICE VISIT (OUTPATIENT)
Dept: OBGYN CLINIC | Facility: MEDICAL CENTER | Age: 45
End: 2020-05-08
Payer: OTHER MISCELLANEOUS

## 2020-05-08 VITALS
HEART RATE: 71 BPM | WEIGHT: 210 LBS | DIASTOLIC BLOOD PRESSURE: 84 MMHG | HEIGHT: 66 IN | BODY MASS INDEX: 33.75 KG/M2 | SYSTOLIC BLOOD PRESSURE: 123 MMHG

## 2020-05-08 DIAGNOSIS — M65.4 DE QUERVAIN'S TENOSYNOVITIS, RIGHT: Primary | ICD-10-CM

## 2020-05-08 PROCEDURE — 3008F BODY MASS INDEX DOCD: CPT | Performed by: ORTHOPAEDIC SURGERY

## 2020-05-08 PROCEDURE — 1036F TOBACCO NON-USER: CPT | Performed by: ORTHOPAEDIC SURGERY

## 2020-05-08 PROCEDURE — 99213 OFFICE O/P EST LOW 20 MIN: CPT | Performed by: ORTHOPAEDIC SURGERY

## 2020-06-20 ENCOUNTER — HOSPITAL ENCOUNTER (EMERGENCY)
Facility: HOSPITAL | Age: 45
Discharge: HOME | End: 2020-06-20
Attending: EMERGENCY MEDICINE | Admitting: EMERGENCY MEDICINE
Payer: COMMERCIAL

## 2020-06-20 ENCOUNTER — APPOINTMENT (EMERGENCY)
Dept: RADIOLOGY | Facility: HOSPITAL | Age: 45
End: 2020-06-20
Payer: COMMERCIAL

## 2020-06-20 VITALS
RESPIRATION RATE: 16 BRPM | TEMPERATURE: 98.7 F | SYSTOLIC BLOOD PRESSURE: 166 MMHG | DIASTOLIC BLOOD PRESSURE: 99 MMHG | BODY MASS INDEX: 33.91 KG/M2 | HEART RATE: 80 BPM | OXYGEN SATURATION: 98 % | WEIGHT: 210.1 LBS

## 2020-06-20 DIAGNOSIS — S63.601A SPRAIN OF HAND, THUMB, RIGHT: Primary | ICD-10-CM

## 2020-06-20 PROCEDURE — 99283 EMERGENCY DEPT VISIT LOW MDM: CPT

## 2020-06-20 PROCEDURE — 73140 X-RAY EXAM OF FINGER(S): CPT

## 2020-06-20 PROCEDURE — 99284 EMERGENCY DEPT VISIT MOD MDM: CPT | Performed by: EMERGENCY MEDICINE

## 2020-06-20 PROCEDURE — 21930 EXC BACK LES SC < 3 CM: CPT | Performed by: EMERGENCY MEDICINE

## 2020-06-20 RX ORDER — NAPROXEN 500 MG/1
500 TABLET ORAL 2 TIMES DAILY WITH MEALS
Qty: 15 TABLET | Refills: 0 | Status: SHIPPED | OUTPATIENT
Start: 2020-06-20

## 2020-07-20 ENCOUNTER — TELEPHONE (OUTPATIENT)
Dept: OBGYN CLINIC | Facility: MEDICAL CENTER | Age: 45
End: 2020-07-20

## 2020-07-20 NOTE — TELEPHONE ENCOUNTER
Toshia Freeman from [employer] called to set up a deposition with Dr Jesus Rudd regarding this patient's encounter on 5/8/2020  I let her know that Dr Jesus Rudd is currently seeing patients during office hours  She asked me to give him her number at his leisure to call back & set up deposition       Toshia Freeman: [phone]

## 2020-08-05 ENCOUNTER — OFFICE VISIT (OUTPATIENT)
Dept: URGENT CARE | Age: 45
End: 2020-08-05
Payer: COMMERCIAL

## 2020-08-05 ENCOUNTER — APPOINTMENT (OUTPATIENT)
Dept: RADIOLOGY | Age: 45
End: 2020-08-05
Payer: COMMERCIAL

## 2020-08-05 VITALS
BODY MASS INDEX: 33.75 KG/M2 | HEART RATE: 80 BPM | DIASTOLIC BLOOD PRESSURE: 76 MMHG | OXYGEN SATURATION: 18 % | SYSTOLIC BLOOD PRESSURE: 126 MMHG | WEIGHT: 210 LBS | TEMPERATURE: 98.6 F | RESPIRATION RATE: 18 BRPM | HEIGHT: 66 IN

## 2020-08-05 DIAGNOSIS — M54.50 ACUTE BILATERAL LOW BACK PAIN WITHOUT SCIATICA: Primary | ICD-10-CM

## 2020-08-05 DIAGNOSIS — M54.50 CHRONIC LOW BACK PAIN, UNSPECIFIED BACK PAIN LATERALITY, UNSPECIFIED WHETHER SCIATICA PRESENT: ICD-10-CM

## 2020-08-05 DIAGNOSIS — G89.29 CHRONIC LOW BACK PAIN, UNSPECIFIED BACK PAIN LATERALITY, UNSPECIFIED WHETHER SCIATICA PRESENT: ICD-10-CM

## 2020-08-05 DIAGNOSIS — M54.50 ACUTE BILATERAL LOW BACK PAIN WITHOUT SCIATICA: ICD-10-CM

## 2020-08-05 PROCEDURE — 72100 X-RAY EXAM L-S SPINE 2/3 VWS: CPT

## 2020-08-05 PROCEDURE — 99213 OFFICE O/P EST LOW 20 MIN: CPT | Performed by: PHYSICIAN ASSISTANT

## 2020-08-05 RX ORDER — METHOCARBAMOL 750 MG/1
TABLET, FILM COATED ORAL
Qty: 24 TABLET | Refills: 0 | Status: SHIPPED | OUTPATIENT
Start: 2020-08-05

## 2020-08-05 RX ORDER — PREDNISONE 20 MG/1
TABLET ORAL
Qty: 10 TABLET | Refills: 0 | Status: SHIPPED | OUTPATIENT
Start: 2020-08-05

## 2020-08-05 NOTE — LETTER
August 5, 2020     Patient: Jeff Harrison   YOB: 1975   Date of Visit: 8/5/2020       To Whom It May Concern:    Patient was seen in office today for acute medical ailment  Recommend off work through Friday 8/7/2020           Sincerely,        Oliver Canada PA-C    CC: No Recipients

## 2020-08-05 NOTE — PATIENT INSTRUCTIONS
Take prednisone and muscle relaxant as instructed  While on prednisone do not take any Aleve or ibuprofen like products  May safely take Tylenol  Cold compresses to back 5-10 minutes every 1-2 hours as needed for back pain  Due to your history of chronic back pain, strongly encouraged that you get further evaluation with your primary care doctor or through the [facility] program   Referral placed in your [facility]'s chart  If you are not interested in the [facility], recommend that you call your family doctor as soon as possible to arrange follow-up evaluation for the next 5-7 days  If significant worsening of your back pain, please proceed to emergency room for further evaluation

## 2020-08-05 NOTE — PROGRESS NOTES
[facility]    NAME: Jonah Bird is a 38 y o  female  : 1975    MRN: [id number]  DATE: 2020  TIME: 4:31 PM    Assessment and Plan   Acute bilateral low back pain without sciatica [M54 5]  1  Acute bilateral low back pain without sciatica  XR spine lumbar 2 or 3 views injury    methocarbamol (ROBAXIN) 750 mg tablet    predniSONE 20 mg tablet    Ambulatory Referral to [facility]   2  Chronic low back pain, unspecified back pain laterality, unspecified whether sciatica present  Ambulatory Referral to [facility]     X-ray lumbar spine:  No acute changes  Disc spaces appear to be well maintained  No obvious spondylolisthesis  Patient did not want injection for acute pain  Work note given  Patient Instructions   Patient Instructions   Take prednisone and muscle relaxant as instructed  While on prednisone do not take any Aleve or ibuprofen like products  May safely take Tylenol  Cold compresses to back 5-10 minutes every 1-2 hours as needed for back pain  Due to your history of chronic back pain, strongly encouraged that you get further evaluation with your primary care doctor or through the [facility] program   Referral placed in your [facility]'s chart  If you are not interested in the [facility], recommend that you call your family doctor as soon as possible to arrange follow-up evaluation for the next 5-7 days  If significant worsening of your back pain, please proceed to emergency room for further evaluation  Chief Complaint     Chief Complaint   Patient presents with    Back Pain     pt states over time her lower back is getting worse  pt has taken otc medication with little relief  History of Present Illness   Jonah Bird presents to the clinic c/o  71-year-old female presents with complaint of back pain  Started:  Over 7 years ago but worsened over the weekend    Thinks it was aggravated by lifting boxes  Modifying factors:  History of prior back problems:  Chronic back pain problems  Patient reports that she drank 20 for Aleve pills on Saturday  She has also been doing heat without much relief  Sleep is affected  Denies bowel or bladder dysfunction  Has a little bit of tingling pins and needle sensation right ankle off and on  At times feels like pain will cause her legs to go out from underneath her  Needs note for work  Review of Systems   Review of Systems   Constitutional: Positive for activity change  Negative for appetite change, chills and fever  Respiratory: Negative  Cardiovascular: Negative  Gastrointestinal: Negative for abdominal pain  Typically passes stools once a week  No acute changes  Genitourinary: Negative for difficulty urinating, flank pain, frequency and urgency  Musculoskeletal: Positive for back pain  Current Medications     Long-Term Medications   Medication Sig Dispense Refill    ALPRAZolam (XANAX) 0 25 mg tablet Take 1 tablet (0 25 mg total) by mouth 2 (two) times a day as needed for anxiety for up to 7 days 14 tablet 0    cetirizine (ZyrTEC) 10 mg tablet Take 1 tablet (10 mg total) by mouth daily 30 tablet 0    diazepam (VALIUM) 5 mg tablet Take 1 tablet (5 mg total) by mouth 2 (two) times a day for 5 days 10 tablet 0    DULoxetine (CYMBALTA) 60 mg delayed release capsule Take 60 mg by mouth daily      fluticasone (FLONASE) 50 mcg/act nasal spray 2 sprays into each nostril daily 16 g 0    hydrOXYzine HCL (ATARAX) 25 mg tablet Take 1 tablet (25 mg total) by mouth daily at bedtime 30 tablet 1    levothyroxine 50 mcg tablet Take 1 tablet (50 mcg total) by mouth daily for 255 days 30 tablet 5    methocarbamol (ROBAXIN) 500 mg tablet Take 1 tablet (500 mg total) by mouth 2 (two) times a day 20 tablet 0    methocarbamol (ROBAXIN) 750 mg tablet 1/2 to 1 tablet every 6-8 hours or hs prn for muscle pain, spasms  Home use only  24 tablet 0    naproxen (NAPROSYN) 500 mg tablet Take 1 tablet (500 mg total) by mouth 2 (two) times a day with meals 30 tablet 0    naproxen (NAPROSYN) 500 mg tablet Take 1 tablet (500 mg total) by mouth 2 (two) times a day with meals 15 tablet 0       Current Allergies     Allergies as of 08/05/2020    (No Known Allergies)          The following portions of the patient's history were reviewed and updated as appropriate: allergies, current medications, past family history, past medical history, past social history, past surgical history and problem list   Past Medical History:   Diagnosis Date    Abdominal pain     Amenorrhea     Bacterial vaginosis     Breast pain     Daytime somnolence     Diarrhea     Disease of thyroid gland     Dysmenorrhea     Dysuria     Fibroids     Heavy menses     Hepatic steatosis     Hyperglycemia     Irregular menses     Low back pain     Lumbar spondylosis 7/30/2019    Menometrorrhagia     Nausea & vomiting     Obesity     Oligomenorrhea     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    Right shoulder pain     Uterine fibroid     Wears glasses      Past Surgical History:   Procedure Laterality Date    ABDOMINAL ADHESION SURGERY N/A 5/19/2017    Procedure: LYSIS ADHESIONS;  Surgeon: Barbar Boast, DO;  [facility];  Service:     CYSTOSCOPY N/A 5/19/2017    Procedure: Hickey Musty;  Surgeon: Barbar Boast, DO;  [facility];  Service:    Chelle Brayden HYSTERECTOMY  2017    [place] COLONOSCOPY FLX DX W/COLLJ SPEC WHEN PFRMD N/A 4/24/2018    Procedure: COLONOSCOPY;  Surgeon: Nanci Al MD;  [facility];   Service: General    [place] LAPAROSCOPY W TOT HYSTERECTUTERUS <=250 Jaylin Coppersmith TUBE/OVARY N/A 5/19/2017    Procedure: HYSTERECTOMY LAPAROSCOPIC TOTAL ,BILATERAL SALPINGECTOMY;  Surgeon: Barbar Boast, DO;  [facility];  Service: Gynecology     Family History   Problem Relation Age of Onset    Diabetes Mother     Hypertension Mother     Hyperlipidemia Mother     No Known Problems Father     No Known Problems Brother     No Known Problems Daughter     Throat cancer Maternal Grandmother     Stomach cancer Paternal Grandmother     No Known Problems Daughter     No Known Problems Daughter        Objective   /76   Pulse 80   Temp 98 6 °F (37 °C)   Resp 18   Ht 5' 6" (1 676 m)   Wt 95 3 kg (210 lb)   LMP  (LMP Unknown)   SpO2 (!) 18%   BMI 33 89 kg/m²   No LMP recorded (lmp unknown)  Patient has had a hysterectomy  Physical Exam     Physical Exam   Constitutional: She is oriented to person, place, and time  She appears well-developed  Non-toxic appearance  She does not appear ill  No distress  Walking slow and in slight forward bend position   Cardiovascular: Normal rate, regular rhythm and normal heart sounds  Exam reveals no gallop and no friction rub  No murmur heard  Pulmonary/Chest: Effort normal and breath sounds normal  No stridor  No respiratory distress  She has no wheezes  She has no rhonchi  Abdominal: Soft  Normal appearance  Musculoskeletal:         General: Tenderness present  No swelling or deformity  Lumbar back: She exhibits decreased range of motion, tenderness, bony tenderness, pain and spasm  She exhibits no swelling  Comments: Significant limited range of motion  Patient tearful at times  TTP SI joint region and lower lumbar spinal processes  Neurological: She is alert and oriented to person, place, and time  She displays no weakness and normal reflexes  No sensory deficit  Coordination and gait normal    No EHL weakness  Some pain with seated right straight leg raise  Skin: She is not diaphoretic  Psychiatric: Her behavior is normal  Mood normal    Nursing note and vitals reviewed

## 2020-08-06 ENCOUNTER — TELEPHONE (OUTPATIENT)
Dept: PHYSICAL THERAPY | Facility: OTHER | Age: 45
End: 2020-08-06

## 2020-08-06 NOTE — TELEPHONE ENCOUNTER
Called per referral     Voice message left for patient to call back  Phone number and hours of business provided  Patient informed that we are working remotely and that call from us will show up as a non [facility] ph#  This is the 1st attempt to reach the patient  Will defer per protocol

## 2020-08-12 ENCOUNTER — NURSE TRIAGE (OUTPATIENT)
Dept: PHYSICAL THERAPY | Facility: OTHER | Age: 45
End: 2020-08-12

## 2020-08-12 DIAGNOSIS — M54.50 ACUTE EXACERBATION OF CHRONIC LOW BACK PAIN: Primary | ICD-10-CM

## 2020-08-12 DIAGNOSIS — G89.29 ACUTE EXACERBATION OF CHRONIC LOW BACK PAIN: Primary | ICD-10-CM

## 2020-08-12 NOTE — TELEPHONE ENCOUNTER
Additional Information   Negative: Is this related to a work injury?  Negative: Is this related to an MVA?  Negative: Are you currently recieving homecare services?  Negative: Has the patient had unexplained weight loss?  Negative: Does the patient have a fever?  Negative: Is the patient experiencing blood in sputum?  Negative: Is the patient experiencing urine retention?  Negative: Has the patient experienced major trauma? (fall from height, high speed collision, direct blow to spine) and is also experiencing nausea, light-headedness, or loss of consciousness?  Negative: Is the patient experiencing acute drop foot or paralysis?  Affirmative: Is this a chronic condition? Background - Initial Assessment  Clinical complaint: bilateral low back pain  Date of onset: history of chronic back pain- acute episode started 1-2 weeks ago- NKI  Frequency of pain: constant  Quality of pain: dull ache, sharp, shooting, burning- depending on movement    Protocols used: [facility] AMB COMPREHENSIVE SPINE PROGRAM PROTOCOL    OV on 8/5/20=PLEASE see notes  Triaged for above complaints-NO RF s/s present  Contact information for [facility] given as well  Nurse informed her that she will be receiving a call from the behavioral office d/t score  Patient very appreciative of call    Referral closed per protocol

## 2020-09-22 ENCOUNTER — TELEPHONE (OUTPATIENT)
Dept: FAMILY MEDICINE CLINIC | Facility: CLINIC | Age: 45
End: 2020-09-22

## 2020-09-22 NOTE — TELEPHONE ENCOUNTER
On 7/6/2020 patient established care with MD Milana Cm [id number]   [address]   [place] [address] [phone] (Fax)

## 2020-09-28 ENCOUNTER — TELEPHONE (OUTPATIENT)
Dept: PSYCHIATRY | Facility: CLINIC | Age: 45
End: 2020-09-28

## 2020-09-29 NOTE — TELEPHONE ENCOUNTER
09/29/20 10:14 AM     Thank you for your request  Your request has been received, reviewed, and the patient chart updated  The PCP has successfully been removed with a patient attribution note  This message will now be completed      Thank you  Raleigh Cardona

## 2020-12-27 ENCOUNTER — OFFICE VISIT (OUTPATIENT)
Dept: URGENT CARE | Age: 45
End: 2020-12-27
Payer: COMMERCIAL

## 2020-12-27 VITALS
RESPIRATION RATE: 18 BRPM | HEIGHT: 66 IN | WEIGHT: 200 LBS | OXYGEN SATURATION: 97 % | TEMPERATURE: 97.9 F | BODY MASS INDEX: 32.14 KG/M2 | HEART RATE: 96 BPM

## 2020-12-27 DIAGNOSIS — R43.2 LOSS OF TASTE: Primary | ICD-10-CM

## 2020-12-27 PROCEDURE — 99213 OFFICE O/P EST LOW 20 MIN: CPT | Performed by: NURSE PRACTITIONER

## 2020-12-27 PROCEDURE — U0003 INFECTIOUS AGENT DETECTION BY NUCLEIC ACID (DNA OR RNA); SEVERE ACUTE RESPIRATORY SYNDROME CORONAVIRUS 2 (SARS-COV-2) (CORONAVIRUS DISEASE [COVID-19]), AMPLIFIED PROBE TECHNIQUE, MAKING USE OF HIGH THROUGHPUT TECHNOLOGIES AS DESCRIBED BY CMS-2020-01-R: HCPCS | Performed by: NURSE PRACTITIONER

## 2020-12-29 LAB — SARS-COV-2 RNA SPEC QL NAA+PROBE: NOT DETECTED

## 2021-03-23 ENCOUNTER — OFFICE VISIT (OUTPATIENT)
Dept: URGENT CARE | Age: 46
End: 2021-03-23
Payer: COMMERCIAL

## 2021-03-23 VITALS
WEIGHT: 200 LBS | RESPIRATION RATE: 20 BRPM | BODY MASS INDEX: 32.14 KG/M2 | HEART RATE: 92 BPM | OXYGEN SATURATION: 98 % | HEIGHT: 66 IN | TEMPERATURE: 97.8 F

## 2021-03-23 DIAGNOSIS — B34.9 VIRAL SYNDROME: Primary | ICD-10-CM

## 2021-03-23 PROCEDURE — 99213 OFFICE O/P EST LOW 20 MIN: CPT | Performed by: PHYSICIAN ASSISTANT

## 2021-03-23 PROCEDURE — U0005 INFEC AGEN DETEC AMPLI PROBE: HCPCS | Performed by: PHYSICIAN ASSISTANT

## 2021-03-23 PROCEDURE — U0003 INFECTIOUS AGENT DETECTION BY NUCLEIC ACID (DNA OR RNA); SEVERE ACUTE RESPIRATORY SYNDROME CORONAVIRUS 2 (SARS-COV-2) (CORONAVIRUS DISEASE [COVID-19]), AMPLIFIED PROBE TECHNIQUE, MAKING USE OF HIGH THROUGHPUT TECHNOLOGIES AS DESCRIBED BY CMS-2020-01-R: HCPCS | Performed by: PHYSICIAN ASSISTANT

## 2021-03-23 NOTE — PATIENT INSTRUCTIONS
Patient Instructions   COVID testing initiated  Results may take up to 5-10 days to return, but often come back sooner (2-4 days)     If the patient has a [facility] My Chart account, results may be accessed on line  If the patient does not have the [employer] Chart account, please establish one so results can be accessed  This will be the easiest and quickest way to get a copy of your test results if you require printed documentation  If patient is symptomatic and until results are obtained, home quarantine / self isolation strongly encouraged  If testing is done for screening purposes and patient is not symptomatic, we still recommend masking, social distancing, good hygiene practices be followed  If COVID test is positive, patient / care giver will be contacted by ordering provider or designated staff  If COVID test is positive, please call the primary care provider office to inform of positive test and request follow up evaluation appointment  (Generally, primary care providers are doing telemedicine visits with their positive COVID patients )  If COVID test is positive, please again review all information below  Further questions may be addressed by the primary care provider or the [address] [place] [address] Jimmie at [phone]  If the patient / caregiver has not heard about test results or has been unable to access results on the patient My Chart account in a timely fashion, please call the provider's office where test was ordered (or Hot Line if applicable)  to inquire about results  If results are negative and patient / care giver has been found to have already accessed results through the [address]  [email or web address] Chart alex, no call will be made  Until results are obtained, home quarantine / self isolation strongly encouraged       If the patient would develop profound weakness, chest pain, shortness of breath please proceed to an emergency room for further evaluation otherwise we do recommend that patient follow-up with their primary care provider in the next 5-7 days if not improving  Symptomatic treatment as needed for symptoms relief based on age / medical status of patient  Things like warm salt water gargles, Tylenol or Ibuprofen (if not contraindicated), drinking plenty of fluids, nasal saline rinses / spray, warm tea with honey (not for patients less than 1 year of age),  etc may provide symptoms relief  [address]     Your healthcare provider and/or public health staff have evaluated you and have determined that you do not need to remain in the hospital at this time  At this time you can be isolated at home where you will be monitored by staff from your local or state health department  You should carefully follow the prevention and isolation steps below until a healthcare provider or local or state health department says that you can return to your normal activities  Stay home except to get medical care     People who are mildly ill with COVID-19 are able to isolate at home during their illness  You should restrict activities outside your home, except for getting medical care  Do not go to work, school, or public areas  Avoid using public transportation, ride-sharing, or taxis  Separate yourself from other people and animals in your home     People: As much as possible, you should stay in a specific room and away from other people in your home  Also, you should use a separate bathroom, if available  Animals: You should restrict contact with pets and other animals while you are sick with COVID-19, just like you would around other people  Although there have not been reports of pets or other animals becoming sick with COVID-19, it is still recommended that people sick with COVID-19 limit contact with animals until more information is known about the virus   When possible, have another member of your household care for your animals while you are sick  If you are sick with COVID-19, avoid contact with your pet, including petting, snuggling, being kissed or licked, and sharing food  If you must care for your pet or be around animals while you are sick, wash your hands before and after you interact with pets and wear a facemask  See COVID-19 and Animals for more information  Call ahead before visiting your doctor     If you have a medical appointment, call the healthcare provider and tell them that you have or may have COVID-19  This will help the healthcare providers office take steps to keep other people from getting infected or exposed  Wear a facemask     You should wear a facemask when you are around other people (e g , sharing a room or vehicle) or pets and before you enter a healthcare providers office  If you are not able to wear a facemask (for example, because it causes trouble breathing), then people who live with you should not stay in the same room with you, or they should wear a facemask if they enter your room  Cover your coughs and sneezes     Cover your mouth and nose with a tissue when you cough or sneeze  Throw used tissues in a lined trash can  Immediately wash your hands with soap and water for at least 20 seconds or, if soap and water are not available, clean your hands with an alcohol-based hand  that contains at least 60% alcohol  Clean your hands often     Wash your hands often with soap and water for at least 20 seconds, especially after blowing your nose, coughing, or sneezing; going to the bathroom; and before eating or preparing food  If soap and water are not readily available, use an alcohol-based hand  with at least 60% alcohol, covering all surfaces of your hands and rubbing them together until they feel dry  Soap and water are the best option if hands are visibly dirty  Avoid touching your eyes, nose, and mouth with unwashed hands       Avoid sharing personal household items     You should not share dishes, drinking glasses, cups, eating utensils, towels, or bedding with other people or pets in your home  After using these items, they should be washed thoroughly with soap and water  Clean all high-touch surfaces everyday     High touch surfaces include counters, tabletops, doorknobs, bathroom fixtures, toilets, phones, keyboards, tablets, and bedside tables  Also, clean any surfaces that may have blood, stool, or body fluids on them  Use a household cleaning spray or wipe, according to the label instructions  Labels contain instructions for safe and effective use of the cleaning product including precautions you should take when applying the product, such as wearing gloves and making sure you have good ventilation during use of the product  Monitor your symptoms     Seek prompt medical attention if your illness is worsening (e g , difficulty breathing)  Before seeking care, call your healthcare provider and tell them that you have, or are being evaluated for, COVID-19  Put on a facemask before you enter the facility  These steps will help the healthcare providers office to keep other people in the office or waiting room from getting infected or exposed  Ask your healthcare provider to call the local or [facility] health department  Persons who are placed under active monitoring or facilitated self-monitoring should follow instructions provided by their local health department or occupational health professionals, as appropriate  If you have a medical emergency and need to call 911, notify the dispatch personnel that you have, or are being evaluated for COVID-19  If possible, put on a facemask before emergency medical services arrive       Discontinuing home isolation     Patients with confirmed COVID-19 should remain under home isolation precautions until the following conditions are met:   § They have had no fever for at least 24 hours (that is one full day of no fever without the use medicine that reduces fevers)  AND  § other symptoms have improved (for example, when their cough or shortness of breath have improved)  AND  § at least 10 days have passed since their symptoms first appeared     Patients with confirmed COVID-19 should also notify close contacts (including their workplace) and ask that they self-quarantine  Currently, close contact is defined as being within 6 feet for for a cumulative total of 15 minutes or more over a 24 hour period starting from 2 days before illness onset  (or, for asymptomatic patients, 2 days prior to test specimen collection)  Close contacts of patients diagnosed with COVID-19 should be instructed by the patient to self-quarantine for 14 days from the last time of their last contact with the patient        Source: [employer]

## 2021-03-23 NOTE — LETTER
March 23, 2021     Patient: Zeinab Iraheta   YOB: 1975   Date of Visit: 3/23/2021       To Whom It May Concern: It is my medical opinion that Zeinab Iraheta should remain out of work until cleared by lab examination  If you have any questions or concerns, please don't hesitate to call           Sincerely,        Rahul Quispe PA-C    CC: No Recipients

## 2021-03-23 NOTE — PROGRESS NOTES
[facility]        NAME: Amos Garcia is a 41 y o  female  : 1975    MRN: [id number]  DATE: 2021  TIME: 9:32 AM    Assessment and Plan   Viral syndrome [B34 9]  1  Viral syndrome  Novel Coronavirus (Covid-19),PCR [facility]     Discussed use of Vit C, D3, Miltivitamin  Discuessed OTC cold medications  Discussed quarantine  Discussed signs and sx of worsening condition and indications to return or go to ED  Patient Instructions       Continue to monitor symptoms  If new or worsening symptoms develop, go immediately to Er  Drink plenty of fluids  Follow up with Family Doctor this week  Chief Complaint     Chief Complaint   Patient presents with    COVID-19     symptoms started about 2 days ago  pt works in a warehouse  pt symptoms muscle aches, fatigue, headache, runny nose and congestion  no fever, chills          History of Present Illness       Cough  This is a new problem  Episode onset: 3 days ago  The problem has been unchanged  The problem occurs every few minutes  The cough is non-productive  Associated symptoms include chills, myalgias, nasal congestion, postnasal drip, rhinorrhea and a sore throat  Pertinent negatives include no chest pain, ear pain, fever, headaches, rash, shortness of breath or wheezing  Associated symptoms comments: No nausea or vomiting  Pt has about 3 episodes diarrhea/day  Pt has loss of salazar  Nothing aggravates the symptoms  She has tried nothing for the symptoms  No known sick contacts    Review of Systems   Review of Systems   Constitutional: Positive for chills and fatigue  Negative for diaphoresis and fever  HENT: Positive for postnasal drip, rhinorrhea, sinus pressure, sinus pain, sneezing and sore throat  Negative for congestion, ear pain and voice change  Eyes: Negative  Respiratory: Positive for cough  Negative for chest tightness, shortness of breath and wheezing      Cardiovascular: Negative for chest pain and palpitations  Gastrointestinal: Negative for abdominal pain, constipation, diarrhea, nausea and vomiting  Endocrine: Negative  Genitourinary: Negative for dysuria  Musculoskeletal: Positive for myalgias  Negative for back pain and neck pain  Skin: Negative for pallor and rash  Allergic/Immunologic: Negative  Neurological: Negative for dizziness, syncope and headaches  Hematological: Negative  Psychiatric/Behavioral: Negative  Current Medications       Current Outpatient Medications:     albuterol (2 5 mg/3 mL) 0 083 % nebulizer solution, Take 1 vial (2 5 mg total) by nebulization every 4 (four) hours as needed for wheezing or shortness of breath, Disp: 60 vial, Rfl: 0    albuterol (PROVENTIL HFA,VENTOLIN HFA) 90 mcg/act inhaler, Inhale 2 puffs every 6 (six) hours as needed for wheezing, Disp: 1 Inhaler, Rfl: 0    ALPRAZolam (XANAX) 0 25 mg tablet, Take 1 tablet (0 25 mg total) by mouth 2 (two) times a day as needed for anxiety for up to 7 days, Disp: 14 tablet, Rfl: 0    cetirizine (ZyrTEC) 10 mg tablet, Take 1 tablet (10 mg total) by mouth daily, Disp: 30 tablet, Rfl: 0    diazepam (VALIUM) 5 mg tablet, Take 1 tablet (5 mg total) by mouth 2 (two) times a day for 5 days, Disp: 10 tablet, Rfl: 0    DULoxetine (CYMBALTA) 60 mg delayed release capsule, Take 60 mg by mouth daily, Disp: , Rfl:     fluticasone (FLONASE) 50 mcg/act nasal spray, 2 sprays into each nostril daily, Disp: 16 g, Rfl: 0    hydrOXYzine HCL (ATARAX) 25 mg tablet, Take 1 tablet (25 mg total) by mouth daily at bedtime, Disp: 30 tablet, Rfl: 1    levothyroxine 50 mcg tablet, Take 1 tablet (50 mcg total) by mouth daily for 255 days, Disp: 30 tablet, Rfl: 5    methocarbamol (ROBAXIN) 500 mg tablet, Take 1 tablet (500 mg total) by mouth 2 (two) times a day, Disp: 20 tablet, Rfl: 0    methocarbamol (ROBAXIN) 750 mg tablet, 1/2 to 1 tablet every 6-8 hours or hs prn for muscle pain, spasms  Home use only  , Disp: 24 tablet, Rfl: 0    naproxen (NAPROSYN) 500 mg tablet, Take 1 tablet (500 mg total) by mouth 2 (two) times a day with meals, Disp: 30 tablet, Rfl: 0    naproxen (NAPROSYN) 500 mg tablet, Take 1 tablet (500 mg total) by mouth 2 (two) times a day with meals, Disp: 15 tablet, Rfl: 0    predniSONE 20 mg tablet, One po bid x 5 days  Take with food (Patient not taking: Reported on 12/27/2020), Disp: 10 tablet, Rfl: 0    Current Allergies     Allergies as of 03/23/2021    (No Known Allergies)            The following portions of the patient's history were reviewed and updated as appropriate: allergies, current medications, past family history, past medical history, past social history, past surgical history and problem list      Past Medical History:   Diagnosis Date    Abdominal pain     Amenorrhea     Bacterial vaginosis     Breast pain     Daytime somnolence     Diarrhea     Disease of thyroid gland     Dysmenorrhea     Dysuria     Fibroids     Heavy menses     Hepatic steatosis     Hyperglycemia     Irregular menses     Low back pain     Lumbar spondylosis 7/30/2019    Menometrorrhagia     Nausea & vomiting     Obesity     Oligomenorrhea     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    Right shoulder pain     Uterine fibroid     Wears glasses        Past Surgical History:   Procedure Laterality Date    ABDOMINAL ADHESION SURGERY N/A 5/19/2017    Procedure: LYSIS ADHESIONS;  Surgeon: [facility] ;  [facility];  Service:     CYSTOSCOPY N/A 5/19/2017    Procedure: Estefany Thakkar;  Surgeon: [facility], ;  [facility];  Service:    Sol Tay HYSTERECTOMY  2017    MA COLONOSCOPY FLX DX W/COLLJ SPEC WHEN PFRMD N/A 4/24/2018    Procedure: COLONOSCOPY;  Surgeon: Zeina Dejesus MD;  [facility];   Service: General    MA LAPAROSCOPY W TOT HYSTERECTUTERUS <=250 GRAM  W TUBE/OVARY N/A 5/19/2017    Procedure: HYSTERECTOMY LAPAROSCOPIC TOTAL ,BILATERAL SALPINGECTOMY;  Surgeon: Imani Thomas DO;  [facility];  Service: Gynecology       Family History   Problem Relation Age of Onset    Diabetes Mother     Hypertension Mother     Hyperlipidemia Mother     No Known Problems Father     No Known Problems Brother     No Known Problems Daughter     Throat cancer Maternal Grandmother     Stomach cancer Paternal Grandmother     No Known Problems Daughter     No Known Problems Daughter          Medications have been verified  Objective   Pulse 92   Temp 97 8 °F (36 6 °C)   Resp 20   Ht 5' 6" (1 676 m)   Wt 90 7 kg (200 lb)   LMP  (LMP Unknown)   SpO2 98%   BMI 32 28 kg/m²        Physical Exam     Physical Exam  Vitals signs and nursing note reviewed  Constitutional:       General: She is not in acute distress  Appearance: Normal appearance  She is well-developed  She is not ill-appearing or diaphoretic  HENT:      Head: Normocephalic and atraumatic  Right Ear: Tympanic membrane, ear canal and external ear normal       Left Ear: Tympanic membrane, ear canal and external ear normal       Nose: Congestion present  No rhinorrhea  Mouth/Throat:      Pharynx: Posterior oropharyngeal erythema present  No oropharyngeal exudate  Eyes:      General:         Right eye: No discharge  Left eye: No discharge  Conjunctiva/sclera: Conjunctivae normal    Neck:      Musculoskeletal: Normal range of motion and neck supple  Cardiovascular:      Rate and Rhythm: Normal rate and regular rhythm  Heart sounds: Normal heart sounds  Pulmonary:      Effort: Pulmonary effort is normal  No respiratory distress  Breath sounds: Normal breath sounds  No wheezing, rhonchi or rales  Lymphadenopathy:      Cervical: No cervical adenopathy  Skin:     General: Skin is warm  Capillary Refill: Capillary refill takes less than 2 seconds  Findings: No rash  Neurological:      Mental Status: She is alert

## 2021-03-24 LAB — SARS-COV-2 RNA RESP QL NAA+PROBE: NEGATIVE

## 2021-05-19 ENCOUNTER — OFFICE VISIT (OUTPATIENT)
Dept: OBGYN CLINIC | Facility: CLINIC | Age: 46
End: 2021-05-19
Payer: COMMERCIAL

## 2021-05-19 VITALS — WEIGHT: 214 LBS | SYSTOLIC BLOOD PRESSURE: 126 MMHG | BODY MASS INDEX: 34.54 KG/M2 | DIASTOLIC BLOOD PRESSURE: 80 MMHG

## 2021-05-19 DIAGNOSIS — G89.29 CHRONIC PELVIC PAIN IN FEMALE: Primary | ICD-10-CM

## 2021-05-19 DIAGNOSIS — R10.2 CHRONIC PELVIC PAIN IN FEMALE: Primary | ICD-10-CM

## 2021-05-19 PROCEDURE — 1036F TOBACCO NON-USER: CPT | Performed by: OBSTETRICS & GYNECOLOGY

## 2021-05-19 PROCEDURE — 99202 OFFICE O/P NEW SF 15 MIN: CPT | Performed by: OBSTETRICS & GYNECOLOGY

## 2021-05-19 NOTE — PROGRESS NOTES
CC:    Pelvic pain    HPI: Janet Decker presents for evaluation of pelvic pain that she states has been present for about a year  The pain comes as twinges, worse with sitting and other positions, and does not seem to interfere with her bowel or bladder habits  The patient her only takes medications for this  She did have a total laparoscopic hysterectomy with ovarian preservation in 2017  She did have a normal pelvic ultrasound in 2018  Past Medical History:  Past Medical History:   Diagnosis Date    Abdominal pain     Amenorrhea     Bacterial vaginosis     Breast pain     Daytime somnolence     Depression     Diarrhea     Disease of thyroid gland     Dysmenorrhea     Dysuria     Fibroids     Heavy menses     Hepatic steatosis     Hyperglycemia     Irregular menses     Low back pain     Lumbar spondylosis 7/30/2019    Menometrorrhagia     Nausea & vomiting     Obesity     Oligomenorrhea     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    Right shoulder pain     Uterine fibroid     Wears glasses        Past Surgical History:  Past Surgical History:   Procedure Laterality Date    ABDOMINAL ADHESION SURGERY N/A 5/19/2017    Procedure: LYSIS ADHESIONS;  Surgeon: Ethan Linder DO;  [facility];  Service:     CYSTOSCOPY N/A 5/19/2017    Procedure: Jeronimo Brush;  Surgeon: Ethan Linder DO;  [facility];  Service:    Bethanne Searing HYSTERECTOMY  2017    HI COLONOSCOPY FLX DX W/COLLJ SPEC WHEN PFRMD N/A 4/24/2018    Procedure: COLONOSCOPY;  Surgeon: Hima Adam MD;  [facility];   Service: General    HI LAPAROSCOPY W TOT HYSTERECTUTERUS <=250 GRAM  W TUBE/OVARY N/A 5/19/2017    Procedure: HYSTERECTOMY LAPAROSCOPIC TOTAL ,BILATERAL SALPINGECTOMY;  Surgeon: Ethan Linder DO;  [facility];  Service: Gynecology       Past OB/Gyn History:   refer to HPI    ALLERGIES: No Known Allergies    MEDS:   Current Outpatient Medications:    albuterol (2 5 mg/3 mL) 0 083 % nebulizer solution    albuterol (PROVENTIL HFA,VENTOLIN HFA) 90 mcg/act inhaler    ALPRAZolam (XANAX) 0 25 mg tablet    cetirizine (ZyrTEC) 10 mg tablet    diazepam (VALIUM) 5 mg tablet    DULoxetine (CYMBALTA) 60 mg delayed release capsule    fluticasone (FLONASE) 50 mcg/act nasal spray    hydrOXYzine HCL (ATARAX) 25 mg tablet    levothyroxine 50 mcg tablet    methocarbamol (ROBAXIN) 500 mg tablet    methocarbamol (ROBAXIN) 750 mg tablet    naproxen (NAPROSYN) 500 mg tablet    naproxen (NAPROSYN) 500 mg tablet    predniSONE 20 mg tablet    Review of Systems:  Skin: No rashes or discolorations of any concern  RESP: Denies SOB, no cough  CV: Denies chest pain or palpitations  Breasts: Denies masses, pain, skin changes and nipple discharge  GI: Denies abdominal pain, heartburn, nausea, vomiting, changes in bowel habits  : Denies dysuria, frequency, CVA tenderness, incontinence and hematuria  Genitalia: Denies abnormal vaginal discharge, external lesions, rashes, pressure,or abnormal bleeding  As longstanding pelvic pain  Rectal:  Denies pain, bleeding, hemorrhoids,    Physical Exam:  /80 (BP Location: Left arm, Patient Position: Sitting, Cuff Size: Standard)   Wt 97 1 kg (214 lb)   LMP  (LMP Unknown)   BMI 34 54 kg/m²    Gen: The patient was alert and oriented x3, pleasant well-appearing female in no acute distress  Abd:  Soft, nontender, nondistended, no masses or organomegaly   No point tenderness guarding or rebound present  Back:  No CVA tenderness, no tenderness to palpation along spine  Pelvic  Normal appearing external female genitalia, no visible lesions, no rashes  Vagina is free of discharge, normal vaginal epithelium, no abnormal  lesions, no evidence of prolapse anteriorly or posteriorly  The uterus and cervix are surgically absent    No palpable adnexal masses  , but there is some tenderness mostly on the right with palpation    No anoperineal lesions  Skin:  No concerning lesions  Extremeties: No edema      Assessment & Plan:   1  Chronic pelvic pain, rule out ovarian cyst   Patient referred to the hospital for pelvic ultrasound  In addition will obtain a urinalysis to rule out an occult bladder infection

## 2021-08-18 ENCOUNTER — OFFICE VISIT (OUTPATIENT)
Dept: URGENT CARE | Age: 46
End: 2021-08-18
Payer: COMMERCIAL

## 2021-08-18 VITALS
OXYGEN SATURATION: 98 % | HEART RATE: 77 BPM | WEIGHT: 205 LBS | BODY MASS INDEX: 32.95 KG/M2 | TEMPERATURE: 97.9 F | RESPIRATION RATE: 17 BRPM | HEIGHT: 66 IN

## 2021-08-18 DIAGNOSIS — R51.9 ACUTE INTRACTABLE HEADACHE, UNSPECIFIED HEADACHE TYPE: ICD-10-CM

## 2021-08-18 DIAGNOSIS — R11.2 NON-INTRACTABLE VOMITING WITH NAUSEA, UNSPECIFIED VOMITING TYPE: Primary | ICD-10-CM

## 2021-08-18 DIAGNOSIS — R52 BODY ACHES: ICD-10-CM

## 2021-08-18 DIAGNOSIS — R19.7 DIARRHEA, UNSPECIFIED TYPE: ICD-10-CM

## 2021-08-18 PROCEDURE — U0005 INFEC AGEN DETEC AMPLI PROBE: HCPCS | Performed by: PHYSICIAN ASSISTANT

## 2021-08-18 PROCEDURE — 99213 OFFICE O/P EST LOW 20 MIN: CPT | Performed by: PHYSICIAN ASSISTANT

## 2021-08-18 PROCEDURE — U0003 INFECTIOUS AGENT DETECTION BY NUCLEIC ACID (DNA OR RNA); SEVERE ACUTE RESPIRATORY SYNDROME CORONAVIRUS 2 (SARS-COV-2) (CORONAVIRUS DISEASE [COVID-19]), AMPLIFIED PROBE TECHNIQUE, MAKING USE OF HIGH THROUGHPUT TECHNOLOGIES AS DESCRIBED BY CMS-2020-01-R: HCPCS | Performed by: PHYSICIAN ASSISTANT

## 2021-08-18 RX ORDER — ONDANSETRON 4 MG/1
TABLET, FILM COATED ORAL
COMMUNITY

## 2021-08-18 RX ORDER — ONDANSETRON 4 MG/1
4 TABLET, ORALLY DISINTEGRATING ORAL ONCE
Status: COMPLETED | OUTPATIENT
Start: 2021-08-18 | End: 2021-08-18

## 2021-08-18 RX ORDER — BUSPIRONE HYDROCHLORIDE 5 MG/1
5 TABLET ORAL 2 TIMES DAILY
COMMUNITY
Start: 2021-05-03 | End: 2022-05-03

## 2021-08-18 RX ORDER — ONDANSETRON 4 MG/1
4 TABLET, ORALLY DISINTEGRATING ORAL EVERY 6 HOURS PRN
Qty: 20 TABLET | Refills: 0 | Status: SHIPPED | OUTPATIENT
Start: 2021-08-18

## 2021-08-18 RX ORDER — ONDANSETRON 4 MG/1
4 TABLET, ORALLY DISINTEGRATING ORAL EVERY 6 HOURS PRN
Status: DISCONTINUED | OUTPATIENT
Start: 2021-08-18 | End: 2021-08-18

## 2021-08-18 RX ADMIN — ONDANSETRON 4 MG: 4 TABLET, ORALLY DISINTEGRATING ORAL at 12:50

## 2021-08-18 NOTE — PATIENT INSTRUCTIONS
COVID testing was obtained today  Isolate until test results are available  You may view your results on MyChart  If you do not have a MyChart, you can create one with the activation code you were given today  If results are negative and feeling well, may resume normal activities  If results are positive, will need to isolate for 10-14 days from onset of symptoms  Any worsening of symptoms especially any difficulty breathing go to the emergency room  Follow-up with your family doctor for further treatment if needed  Nutrition Tips for Relief of Diarrhea   WHAT YOU NEED TO KNOW:   There are diet changes you can make to help relieve or stop diarrhea  These changes include limiting or avoiding foods and liquids that are high in sugar, fat, fiber, and lactose  Lactose is a sugar found in milk products  Milk products can cause diarrhea in people who are lactose intolerant  You should also drink extra liquids to replace fluids that are lost when you have diarrhea  Diarrhea can lead to dehydration  DISCHARGE INSTRUCTIONS:   Foods to limit or avoid:   · Dairy:      ? Whole milk    ? Half-and-half, cream, and sour cream    ? Regular (whole milk) ice cream    · Grains:      ? Whole wheat and whole grain breads, pasta, cereals, and crackers    ? Rm Erika and wild rice    ? Breads and cereals with seeds or nuts    ? Popcorn    · Fruit and vegetables:      ? All raw fruits, except bananas and melon    ? Dried fruits, including prunes and raisins    ? Canned fruit in heavy syrup    ? Prune juice and any fruit juice with pulp    ? Raw vegetables, except lettuce     ? Fried vegetables    ? Corn, raw and cooked broccoli, cabbage, cauliflower, and william greens    · Protein:      ? Fried meat, poultry, and fish    ? High-fat luncheon meats, such as bologna    ? Fatty meats, such as sausage, euceda, and hot dogs    ? Beans and nuts    · Liquids:      ? Sodas and fruit-flavored drinks    ?  Drinks that contain caffeine, such as energy drinks, coffee, and tea     ? Drinks that contain alcohol or sugar alcohol, such as sorbitol    Foods and liquids you may eat or drink:  Most people can tolerate the foods and liquids listed below  If any of them make your symptoms worse, stop eating or drinking them until you feel better  If you are lactose intolerant, avoid milk products  · Dairy:      ? Skim or low-fat milk or evaporated milk    ? Soy milk or buttermilk     ? Low-fat, part-skim, and aged cheese    ? Yogurt, low-fat ice cream, or sherbert    · Grains:  (Choose foods with less than 2 grams of dietary fiber per serving )     ? White or refined flour breads, bagels, pasta, and crackers    ? Cold or hot cereals made from white or refined flour such as puffed rice, cornflakes, or cream of wheat    ? White rice    · Fruit and vegetables:      ? Bananas or melon    ? Fruit juice without pulp, except prune juice    ? Canned fruit in juice or light syrup    ? Lettuce and most well-cooked vegetables without seeds or skins     ? Strained vegetable juice    · Protein:      ? Tender, well-cooked meat, poultry, or fish    ? Well-cooked eggs or soy foods (cooked without added fat)    ? Smooth nut butters    · Fats:  (Limit fats to less than 8 teaspoons a day)     ? Oil, butter, or margarine, or mayonnaise    ? Cream cheese or salad dressings    · Liquids:      ? For infants, breast milk or formula    ? Oral rehydration solution     ? Decaffeinated coffee or caffeine-free teas    ? Soft drinks without caffeine    Other guidelines to follow:   · Drink liquids as directed  You may need to drink more liquids than usual to prevent dehydration  Ask how much liquid to drink each day and which liquids are best for you  You may need to drink an oral rehydration solution (ORS)  An ORS helps replace fluids and electrolytes that you lose when you have diarrhea  · Eat small meals or snacks every 3 to 4 hours  instead of large meals   Continue eating even if you still have diarrhea  Your diarrhea will continue for a few days but should gradually go away  © Copyright [employer] 2021 Information is for End User's use only and may not be sold, redistributed or otherwise used for commercial purposes  All illustrations and images included in CareNotes® are the copyrighted property of A D A M , Inc  or Cornelia Sanders  The above information is an  only  It is not intended as medical advice for individual conditions or treatments  Talk to your doctor, nurse or pharmacist before following any medical regimen to see if it is safe and effective for you  Acute Nausea and Vomiting   WHAT YOU NEED TO KNOW:   Acute nausea and vomiting start suddenly, worsen quickly, and last a short time  DISCHARGE INSTRUCTIONS:   Return to the emergency department if:   · You see blood in your vomit or your bowel movements  · You have sudden, severe pain in your chest and upper abdomen after hard vomiting or retching  · You have swelling in your neck and chest      · You are dizzy, cold, and thirsty and your eyes and mouth are dry  · You are urinating very little or not at all  · You have muscle weakness, leg cramps, and trouble breathing  · Your heart is beating much faster than normal      · You continue to vomit for more than 48 hours  Contact your healthcare provider if:   · You have frequent dry heaves (vomiting but nothing comes out)  · Your nausea and vomiting does not get better or go away after you use medicine  · You have questions or concerns about your condition or treatment  Medicines: You may need any of the following:  · Medicines  may be given to calm your stomach and stop your vomiting  You may also need medicines to help you feel more relaxed or to stop nausea and vomiting caused by motion sickness  · Gastrointestinal stimulants  are used to help empty your stomach and bowels   This may help decrease nausea and vomiting  · Take your medicine as directed  Contact your healthcare provider if you think your medicine is not helping or if you have side effects  Tell him or her if you are allergic to any medicine  Keep a list of the medicines, vitamins, and herbs you take  Include the amounts, and when and why you take them  Bring the list or the pill bottles to follow-up visits  Carry your medicine list with you in case of an emergency  Prevent or manage acute nausea and vomiting:   · Do not drink alcohol  Alcohol may upset or irritate your stomach  Too much alcohol can also cause acute nausea and vomiting  · Control stress  Headaches due to stress may cause nausea and vomiting  Find ways to relax and manage your stress  Get more rest and sleep  · Drink more liquids as directed  Vomiting can lead to dehydration  It is important to drink more liquids to help replace lost body fluids  Ask your healthcare provider how much liquid to drink each day and which liquids are best for you  Your provider may recommend that you drink an oral rehydration solution (ORS)  ORS contains water, salts, and sugar that are needed to replace the lost body fluids  Ask what kind of ORS to use, how much to drink, and where to get it  · Eat smaller meals, more often  Eat small amounts of food every 2 to 3 hours, even if you are not hungry  Food in your stomach may decrease your nausea  · Talk to your healthcare provider before you take over-the-counter (OTC) medicines  These medicines can cause serious problems if you use certain other medicines, or you have a medical condition  You may have problems if you use too much or use them for longer than the label says  Follow directions on the label carefully  Follow up with your healthcare provider as directed:  Write down your questions so you remember to ask them during your follow-up visits    © Copyright [employer] 2021 Information is for End User's use only and may not be sold, redistributed or otherwise used for commercial purposes  All illustrations and images included in CareNotes® are the copyrighted property of A D A M [employer] Inc  or Cornelia Sanders  The above information is an  only  It is not intended as medical advice for individual conditions or treatments  Talk to your doctor, nurse or pharmacist before following any medical regimen to see if it is safe and effective for you

## 2021-08-18 NOTE — LETTER
August 18, 2021     Patient: Saravanan Culp   YOB: 1975   Date of Visit: 8/18/2021       To Whom It May Concern: It is my medical opinion that Saravanan Culp  should remain out of work until test results are available  If you have any questions or concerns, please don't hesitate to call           Sincerely,        Otto Guaman PA-C    CC: No Recipients

## 2021-08-18 NOTE — PROGRESS NOTES
[facility]        NAME: Harinder Izaguirre is a 41 y o  female  : 1975    MRN: [id number]  DATE: 2021  TIME: 12:57 PM    Assessment and Plan   Non-intractable vomiting with nausea, unspecified vomiting type [R11 2]  1  Non-intractable vomiting with nausea, unspecified vomiting type  ondansetron (ZOFRAN-ODT) 4 mg disintegrating tablet    ondansetron (ZOFRAN-ODT) dispersible tablet 4 mg    DISCONTINUED: ondansetron (ZOFRAN-ODT) dispersible tablet 4 mg   2  Acute intractable headache, unspecified headache type  Novel Coronavirus (Covid-19),PCR [facility] - Office Collection   3  Diarrhea, unspecified type     4  Body aches           Patient Instructions     Follow up with PCP in 3-5 days  Proceed to  ER if symptoms worsen  Chief Complaint     Chief Complaint   Patient presents with    Headache     pt c/o body aches, N/V/D, headaches, and dizziness x2 days  pt is vaccinated         History of Present Illness         44-year-old female presents for 2 day complaint of nausea, vomiting, diarrhea, headache, body aches  Patient states she was vaccinated in May with [address] [place] [address]  She has no known positive COVID exposures  Review of Systems   Review of Systems   Constitutional: Positive for fatigue  Negative for activity change, appetite change, chills, diaphoresis and fever  HENT: Negative  Eyes: Negative  Respiratory: Negative  Cardiovascular: Negative  Gastrointestinal: Positive for abdominal pain, diarrhea, nausea and vomiting  Skin: Negative  Neurological: Positive for dizziness and headaches  Negative for light-headedness           Current Medications       Current Outpatient Medications:     albuterol (2 5 mg/3 mL) 0 083 % nebulizer solution, Take 1 vial (2 5 mg total) by nebulization every 4 (four) hours as needed for wheezing or shortness of breath, Disp: 60 vial, Rfl: 0    albuterol (PROVENTIL HFA,VENTOLIN HFA) 90 mcg/act inhaler, Inhale 2 puffs every 6 (six) hours as needed for wheezing, Disp: 1 Inhaler, Rfl: 0    busPIRone (BUSPAR) 5 mg tablet, Take 5 mg by mouth 2 (two) times a day, Disp: , Rfl:     cetirizine (ZyrTEC) 10 mg tablet, Take 1 tablet (10 mg total) by mouth daily, Disp: 30 tablet, Rfl: 0    DULoxetine (CYMBALTA) 60 mg delayed release capsule, Take 60 mg by mouth daily, Disp: , Rfl:     fluticasone (FLONASE) 50 mcg/act nasal spray, 2 sprays into each nostril daily, Disp: 16 g, Rfl: 0    hydrOXYzine HCL (ATARAX) 25 mg tablet, Take 1 tablet (25 mg total) by mouth daily at bedtime, Disp: 30 tablet, Rfl: 1    methocarbamol (ROBAXIN) 500 mg tablet, Take 1 tablet (500 mg total) by mouth 2 (two) times a day, Disp: 20 tablet, Rfl: 0    methocarbamol (ROBAXIN) 750 mg tablet, 1/2 to 1 tablet every 6-8 hours or hs prn for muscle pain, spasms  Home use only  , Disp: 24 tablet, Rfl: 0    naproxen (NAPROSYN) 500 mg tablet, Take 1 tablet (500 mg total) by mouth 2 (two) times a day with meals, Disp: 30 tablet, Rfl: 0    naproxen (NAPROSYN) 500 mg tablet, Take 1 tablet (500 mg total) by mouth 2 (two) times a day with meals, Disp: 15 tablet, Rfl: 0    ondansetron (Zofran) 4 mg tablet, Take by mouth, Disp: , Rfl:     ALPRAZolam (XANAX) 0 25 mg tablet, Take 1 tablet (0 25 mg total) by mouth 2 (two) times a day as needed for anxiety for up to 7 days, Disp: 14 tablet, Rfl: 0    diazepam (VALIUM) 5 mg tablet, Take 1 tablet (5 mg total) by mouth 2 (two) times a day for 5 days, Disp: 10 tablet, Rfl: 0    levothyroxine 50 mcg tablet, Take 1 tablet (50 mcg total) by mouth daily for 255 days, Disp: 30 tablet, Rfl: 5    ondansetron (ZOFRAN-ODT) 4 mg disintegrating tablet, Take 1 tablet (4 mg total) by mouth every 6 (six) hours as needed for nausea or vomiting, Disp: 20 tablet, Rfl: 0    predniSONE 20 mg tablet, One po bid x 5 days    Take with food (Patient not taking: Reported on 12/27/2020), Disp: 10 tablet, Rfl: 0  No current facility-administered medications for this visit  Current Allergies     Allergies as of 08/18/2021    (No Known Allergies)            The following portions of the patient's history were reviewed and updated as appropriate: allergies, current medications, past family history, past medical history, past social history, past surgical history and problem list     Objective   Pulse 77   Temp 97 9 °F (36 6 °C) (Tympanic)   Resp 17   Ht 5' 6" (1 676 m)   Wt 93 kg (205 lb)   LMP  (LMP Unknown)   SpO2 98%   BMI 33 09 kg/m²        Physical Exam     Physical Exam  Vitals and nursing note reviewed  Constitutional:       General: She is not in acute distress  Appearance: She is not ill-appearing  HENT:      Head: Normocephalic and atraumatic  Right Ear: Tympanic membrane, ear canal and external ear normal       Left Ear: Tympanic membrane, ear canal and external ear normal       Nose: Nose normal       Mouth/Throat:      Mouth: Mucous membranes are moist       Pharynx: No oropharyngeal exudate or posterior oropharyngeal erythema  Eyes:      Conjunctiva/sclera: Conjunctivae normal    Cardiovascular:      Rate and Rhythm: Normal rate and regular rhythm  Pulmonary:      Effort: Pulmonary effort is normal       Breath sounds: Normal breath sounds  Abdominal:      General: Bowel sounds are normal       Tenderness: There is no abdominal tenderness  There is no guarding or rebound  Neurological:      Mental Status: She is alert

## 2021-08-19 LAB — SARS-COV-2 RNA RESP QL NAA+PROBE: NEGATIVE

## 2022-10-18 ENCOUNTER — ANNUAL EXAM (OUTPATIENT)
Dept: GYNECOLOGY | Facility: CLINIC | Age: 47
End: 2022-10-18
Payer: MEDICARE

## 2022-10-18 VITALS
WEIGHT: 186.4 LBS | SYSTOLIC BLOOD PRESSURE: 120 MMHG | HEIGHT: 66 IN | DIASTOLIC BLOOD PRESSURE: 76 MMHG | BODY MASS INDEX: 29.96 KG/M2

## 2022-10-18 DIAGNOSIS — R10.2 PELVIC PAIN IN FEMALE: ICD-10-CM

## 2022-10-18 DIAGNOSIS — Z90.79 STATUS POST BILATERAL SALPINGECTOMY: ICD-10-CM

## 2022-10-18 DIAGNOSIS — Z90.710 STATUS POST LAPAROSCOPIC HYSTERECTOMY: ICD-10-CM

## 2022-10-18 DIAGNOSIS — Z12.11 SCREENING FOR COLON CANCER: ICD-10-CM

## 2022-10-18 DIAGNOSIS — Z12.31 ENCOUNTER FOR SCREENING MAMMOGRAM FOR BREAST CANCER: ICD-10-CM

## 2022-10-18 DIAGNOSIS — R10.31 RIGHT LOWER QUADRANT PAIN: ICD-10-CM

## 2022-10-18 DIAGNOSIS — Z01.419 WOMEN'S ANNUAL ROUTINE GYNECOLOGICAL EXAMINATION: ICD-10-CM

## 2022-10-18 PROCEDURE — 99396 PREV VISIT EST AGE 40-64: CPT | Performed by: OBSTETRICS & GYNECOLOGY

## 2022-10-18 RX ORDER — LEVOTHYROXINE SODIUM 0.07 MG/1
75 TABLET ORAL DAILY
COMMUNITY
Start: 2022-09-02

## 2022-10-18 RX ORDER — TRIAMCINOLONE ACETONIDE 1 MG/G
1 CREAM TOPICAL 2 TIMES DAILY
COMMUNITY
Start: 2022-05-15

## 2022-10-18 NOTE — PROGRESS NOTES
Assessment     Annual well-woman exam    Status post total laparoscopic hysterectomy with bilateral salpingectomy     Colon cancer screening     Right lower quadrant pain     hypothyroidism        Plan     Screening laboratory studies ordered    Screening mammography ordered    Cologuard ordered for colon cancer screening     Pelvic ultrasound ordered, right lower quadrant pain     Pap smear deferred no longer indicated history of laparoscopic hysterectomy  All questions answered  Subjective   Here for annual exam     Nena Nur is a 40 y o  female who presents for annual exam  Periods are absent since her total hysterectomy more than 5 years ago  She does have some recurrent right lower quadrant pain and tenderness, not severe  Denies hot flashes vaginal dryness or night sweats  Denies any usual vaginal discharge odor or irritation denies vaginal bleeding  The patient reports that there is not domestic violence in her life  Current contraception: status post hysterectomy  History of abnormal Pap smear: no  Family history of uterine or ovarian cancer: no  Regular self breast exam: yes  History of abnormal mammogram: no  Family history of breast cancer: no  History of abnormal lipids:  unknown  Menstrual History:  OB History        3    Para   3    Term   3       0    AB   0    Living   3       SAB   0    IAB   0    Ectopic   0    Multiple   0    Live Births   0                Menarche age:  16  No LMP recorded (lmp unknown)  Patient has had a hysterectomy  Review of Systems  Pertinent items are noted in HPI        Objective  No acute distress   /76 (BP Location: Right arm, Patient Position: Sitting, Cuff Size: Standard)   Ht 5' 6" (1 676 m)   Wt 84 6 kg (186 lb 6 4 oz)   LMP  (LMP Unknown)   BMI 30 09 kg/m²     General:   alert and oriented, in no acute distress, alert, appears stated age and cooperative   Heart: regular rate and rhythm, S1, S2 normal, no murmur, click, rub or gallop   Lungs: clear to auscultation bilaterally   Abdomen: soft, non-tender, without masses or organomegaly   Vulva: normal, Bartholin's, Urethra, Brenda's normal, female escutcheon   Vagina: normal mucosa, normal discharge, no palpable nodules   Cervix: surgically absent   Uterus: surgically absent   Adnexa: normal adnexa and positive for: fullness, tenderness and  right lower quadrant   Bilateral breast exam in the sitting and supine position with chaperone present, no visible or palpable breast lesions identified  No breast masses noted  No supraclavicular or axillary lymphadenopathy noted  No nipple discharge  Reviewed self-breast exam techniques     Rectal exam,  deferred

## 2022-10-28 LAB — COLOGUARD RESULT REPORTABLE: NEGATIVE

## 2022-11-03 ENCOUNTER — ULTRASOUND (OUTPATIENT)
Dept: GYNECOLOGY | Facility: CLINIC | Age: 47
End: 2022-11-03

## 2022-11-03 DIAGNOSIS — R10.31 RLQ ABDOMINAL PAIN: Primary | ICD-10-CM

## 2022-11-03 NOTE — PROGRESS NOTES
AMB US Pelvic Non OB    Date/Time: 11/3/2022 6:49 AM  Performed by: Abdi Mathias  Authorized by: Balwinder Brown MD   Universal Protocol:  Patient identity confirmed: verbally with patient      Procedure details:     Technique:  Transvaginal US, Non-OB    Position: lithotomy exam    Left ovary findings:     Left ovary:  Visualized    Length (cm): 3 9    Height (cm): 2 11    Width (cm): 2 53  Right ovary findings:     Right ovary:  Visualized    Length (cm): 2 39    Height (cm): 1 44    Width (cm): 2 03  Other findings:     Free pelvic fluid: not identified      Free peritoneal fluid: not identified    Post-Procedure Details:     Impression:  The uterus has been surgically removed  The right ovary is only seen transabdominally and demonstrates a 1cm follicle  The left ovary is only seen transvaginally and demonstrates a 6 1DT follicle  No free fluid  Patient     Tolerance: Tolerated well, no immediate complications    Complications: no complications    Additional Procedure Comments:      [employer] F8 E8C-RS transvaginal transducer Serial # [id number] was used to perform the examination today and subsequently followed with high level disinfection utilizing Trophon EPR procedure  [facility]     [address] [place] [address]  Phone:  [phone]  Fax:  [phone]    AMB US Pelvic Non OB    Date/Time: 11/3/2022 1:34 PM  Performed by: Abdi Mathias  Authorized by: Balwinder Brown MD   Universal Protocol:  Patient identity confirmed: verbally with patient      Procedure details:     Technique:  US Pelvic, Non-OB with complete exam    Position: supine exam    Post-Procedure Details:     Impression:  The exam was performed both transabdominally and transvaginally  Please see TVS for measurements and findings  Tolerance:   Tolerated well, no immediate complications    Complications: no complications

## 2022-11-08 ENCOUNTER — OFFICE VISIT (OUTPATIENT)
Dept: GYNECOLOGY | Facility: CLINIC | Age: 47
End: 2022-11-08

## 2022-11-08 VITALS — DIASTOLIC BLOOD PRESSURE: 80 MMHG | BODY MASS INDEX: 30.18 KG/M2 | WEIGHT: 187 LBS | SYSTOLIC BLOOD PRESSURE: 116 MMHG

## 2022-11-08 DIAGNOSIS — Z90.710 STATUS POST LAPAROSCOPIC HYSTERECTOMY: ICD-10-CM

## 2022-11-08 DIAGNOSIS — E03.9 ACQUIRED HYPOTHYROIDISM: ICD-10-CM

## 2022-11-08 DIAGNOSIS — Z90.79 STATUS POST BILATERAL SALPINGECTOMY: ICD-10-CM

## 2022-11-08 DIAGNOSIS — N94.10 DYSPAREUNIA, FEMALE: Primary | ICD-10-CM

## 2022-11-08 DIAGNOSIS — R10.2 PELVIC PAIN IN FEMALE: ICD-10-CM

## 2022-11-08 DIAGNOSIS — R07.89 CHEST WALL PAIN: ICD-10-CM

## 2022-11-08 RX ORDER — NAPROXEN 500 MG/1
500 TABLET ORAL 2 TIMES DAILY WITH MEALS
Qty: 30 TABLET | Refills: 0 | Status: SHIPPED | OUTPATIENT
Start: 2022-11-08

## 2022-11-08 NOTE — PROGRESS NOTES
Assessment/Plan:    Diagnoses and all orders for this visit:    Dyspareunia, female  -     norethindrone (AYGESTIN) 5 mg tablet; Take 1 tablet (5 mg total) by mouth daily    Pelvic pain in female  -     norethindrone (AYGESTIN) 5 mg tablet; Take 1 tablet (5 mg total) by mouth daily    Chest wall pain  -     naproxen (NAPROSYN) 500 mg tablet; Take 1 tablet (500 mg total) by mouth 2 (two) times a day with meals    Status post laparoscopic hysterectomy    Status post bilateral salpingectomy    Acquired hypothyroidism        Subjective: here for follow-up and test results     Patient ID: Bhumi Quintero is a 40 y o  female  HPI    29-year-old female  status post total laparoscopic hysterectomy with bilateral salpingectomy in 2017  She had been seen infrequently since that time  She is here recently for annual exam was complaining of pelvic pain symptoms  Pain was especially bowels on the right lower quadrant  On a pain scale is 9 5/10  Occasional pain at rest most often with dyspareunia to a point where she has to stop her is unable to finish  We did pelvic ultrasound this past week which revealed surgically absent uterus right ovary was seen only transabdominally and demonstrated 1 cm follicle  Left ovary seen only transvaginally and demonstrated 1 4 cm follicle  There is no free fluid no other abnormalities were detected  I reviewed surgical photographs which revealed extensive pelvic adhesions  Tissue exam revealed adenomyosis  I suspect she likely has pelvic endometriosis causing her the pain and discomfort  We did discuss options of treatment including diagnostic laparoscopy with possible oophorectomy  Also discussed about hormonal suppression with norethindrone 5 mg 1 daily for the next 6 weeks return for follow-up to reassess her symptoms to see if they have improved at all  She also has a prescription for Naprosyn which I gave her refill for use for when she has pain    She states she is not currently sexually active right now  Screening laboratory studies included a TSH which was normal CMP which was normal she did Cologuard test which was normal as well  She return in 6 weeks time to discuss symptoms  Review of Systems    Unchanged    Objective: no acute distress  /80 (BP Location: Left arm, Patient Position: Sitting, Cuff Size: Standard)   Wt 84 8 kg (187 lb)   LMP  (LMP Unknown)   BMI 30 18 kg/m²      Physical Exam  Vitals and nursing note reviewed  Constitutional:       Appearance: Normal appearance  She is obese  HENT:      Head: Normocephalic  Eyes:      Extraocular Movements: Extraocular movements intact  Pupils: Pupils are equal, round, and reactive to light  Pulmonary:      Effort: Pulmonary effort is normal    Genitourinary:     Comments:  Pelvic exam deferred  Musculoskeletal:         General: Normal range of motion  Cervical back: Normal range of motion  Neurological:      Mental Status: She is alert and oriented to person, place, and time  Psychiatric:         Mood and Affect: Mood normal          Behavior: Behavior normal          Thought Content: Thought content normal          Judgment: Judgment normal         Please note    In addition to the time spent discussing the findings and results of today's visit and exam, I spent approximately 20 minutes of face-to-face time with the patient, greater than 50% of which was spent in counseling and coordination of care for this patient

## 2023-01-15 ENCOUNTER — OFFICE VISIT (OUTPATIENT)
Dept: URGENT CARE | Age: 48
End: 2023-01-15

## 2023-01-15 VITALS
OXYGEN SATURATION: 97 % | TEMPERATURE: 98.7 F | RESPIRATION RATE: 18 BRPM | WEIGHT: 180 LBS | HEIGHT: 66 IN | BODY MASS INDEX: 28.93 KG/M2 | HEART RATE: 76 BPM

## 2023-01-15 DIAGNOSIS — B34.9 VIRAL SYNDROME: Primary | ICD-10-CM

## 2023-01-15 DIAGNOSIS — R52 BODY ACHES: ICD-10-CM

## 2023-01-15 LAB
SARS-COV-2 AG UPPER RESP QL IA: NEGATIVE
VALID CONTROL: NORMAL

## 2023-01-15 NOTE — PATIENT INSTRUCTIONS
No signs of bacterial infection on exam today  Signs & symptoms consistent with viral illness  OTC medications & supportive care as directed  Most viral illnesses last about 7-10 days with days 3-5 being the worst in severity of symptoms  If symptoms persist past 10 days, follow up with PCP  If symptoms worsen, proceed to the ER

## 2023-01-15 NOTE — PROGRESS NOTES
[employer]        NAME: Murtis Bloch is a 56 y o  female  : 1975    MRN: [id number]  DATE: January 15, 2023  TIME: 10:31 AM    Assessment and Plan   Viral syndrome [B34 9]  1  Viral syndrome        2  Body aches  Poct Covid 19 Rapid Antigen Test            Patient Instructions     Rapid COVID testing negative  No signs of bacterial infection on exam today  OTC meds & supportive care  Push fluids  Follow up with PCP in 2-3 days  Proceed to ER if symptoms worsen  Chief Complaint     Chief Complaint   Patient presents with   • Generalized Body Aches     Body aches, dizziness, nausea began yesterday         History of Present Illness     56 y o  F  PMH asthma, WOODROW  Wixela BID, Albuterol PRN  Treated for asthma exac end of December with steroid burst  Recently took Azithromycin for nail infection from Podiatry  Nausea, dizziness, body aches x 1 day  Denies CP or SOB; no fevers  +COVID exposure  Vaccinated against COVID; no Flu vaccine  No OTC meds    Review of Systems   Review of Systems   Constitutional: Negative for chills, fatigue and fever  HENT: Negative for congestion, ear pain, facial swelling, hearing loss, rhinorrhea, sinus pressure, sneezing, sore throat and trouble swallowing  Eyes: Negative for pain, redness and visual disturbance  Respiratory: Negative for cough, chest tightness, shortness of breath and wheezing  Cardiovascular: Negative for chest pain and palpitations  Gastrointestinal: Positive for nausea  Negative for abdominal pain, diarrhea and vomiting  Genitourinary: Negative for dysuria, flank pain, hematuria and pelvic pain  Musculoskeletal: Positive for myalgias  Negative for arthralgias and back pain  Skin: Negative for color change and rash  Neurological: Positive for dizziness  Negative for seizures, syncope, weakness, light-headedness and headaches  Psychiatric/Behavioral: Negative for confusion, hallucinations and sleep disturbance   The patient is not nervous/anxious  All other systems reviewed and are negative          Current Medications       Current Outpatient Medications:   •  levothyroxine 75 mcg tablet, Take 75 mcg by mouth daily, Disp: , Rfl:   •  norethindrone (AYGESTIN) 5 mg tablet, Take 1 tablet (5 mg total) by mouth daily, Disp: 30 tablet, Rfl: 2  •  albuterol (2 5 mg/3 mL) 0 083 % nebulizer solution, Take 1 vial (2 5 mg total) by nebulization every 4 (four) hours as needed for wheezing or shortness of breath (Patient not taking: Reported on 1/15/2023), Disp: 60 vial, Rfl: 0  •  albuterol (PROVENTIL HFA,VENTOLIN HFA) 90 mcg/act inhaler, Inhale 2 puffs every 6 (six) hours as needed for wheezing (Patient not taking: Reported on 1/15/2023), Disp: 1 Inhaler, Rfl: 0  •  ALPRAZolam (XANAX) 0 25 mg tablet, Take 1 tablet (0 25 mg total) by mouth 2 (two) times a day as needed for anxiety for up to 7 days (Patient not taking: Reported on 1/15/2023), Disp: 14 tablet, Rfl: 0  •  busPIRone (BUSPAR) 5 mg tablet, Take 5 mg by mouth 2 (two) times a day (Patient not taking: Reported on 1/15/2023), Disp: , Rfl:   •  cetirizine (ZyrTEC) 10 mg tablet, Take 1 tablet (10 mg total) by mouth daily (Patient not taking: Reported on 1/15/2023), Disp: 30 tablet, Rfl: 0  •  diazepam (VALIUM) 5 mg tablet, Take 1 tablet (5 mg total) by mouth 2 (two) times a day for 5 days, Disp: 10 tablet, Rfl: 0  •  DULoxetine (CYMBALTA) 60 mg delayed release capsule, Take 60 mg by mouth daily (Patient not taking: Reported on 1/15/2023), Disp: , Rfl:   •  fluticasone (FLONASE) 50 mcg/act nasal spray, 2 sprays into each nostril daily (Patient not taking: Reported on 1/15/2023), Disp: 16 g, Rfl: 0  •  hydrOXYzine HCL (ATARAX) 25 mg tablet, Take 1 tablet (25 mg total) by mouth daily at bedtime (Patient not taking: Reported on 1/15/2023), Disp: 30 tablet, Rfl: 1  •  methocarbamol (ROBAXIN) 500 mg tablet, Take 1 tablet (500 mg total) by mouth 2 (two) times a day (Patient not taking: Reported on 1/15/2023), Disp: 20 tablet, Rfl: 0  •  methocarbamol (ROBAXIN) 750 mg tablet, 1/2 to 1 tablet every 6-8 hours or hs prn for muscle pain, spasms  Home use only  (Patient not taking: Reported on 1/15/2023), Disp: 24 tablet, Rfl: 0  •  naproxen (NAPROSYN) 500 mg tablet, Take 1 tablet (500 mg total) by mouth 2 (two) times a day with meals (Patient not taking: Reported on 1/15/2023), Disp: 15 tablet, Rfl: 0  •  naproxen (NAPROSYN) 500 mg tablet, Take 1 tablet (500 mg total) by mouth 2 (two) times a day with meals, Disp: 30 tablet, Rfl: 0  •  ondansetron (ZOFRAN) 4 mg tablet, Take by mouth (Patient not taking: Reported on 1/15/2023), Disp: , Rfl:   •  ondansetron (ZOFRAN-ODT) 4 mg disintegrating tablet, Take 1 tablet (4 mg total) by mouth every 6 (six) hours as needed for nausea or vomiting (Patient not taking: Reported on 1/15/2023), Disp: 20 tablet, Rfl: 0  •  predniSONE 20 mg tablet, One po bid x 5 days    Take with food (Patient not taking: Reported on 12/27/2020), Disp: 10 tablet, Rfl: 0  •  triamcinolone (KENALOG) 0 1 % cream, Apply 1 application topically 2 (two) times a day (Patient not taking: Reported on 1/15/2023), Disp: , Rfl:     Current Allergies     Allergies as of 01/15/2023   • (No Known Allergies)            The following portions of the patient's history were reviewed and updated as appropriate: allergies, current medications, past family history, past medical history, past social history, past surgical history and problem list      Past Medical History:   Diagnosis Date   • Abdominal pain    • Amenorrhea    • Bacterial vaginosis    • Breast pain    • Daytime somnolence    • Depression    • Diarrhea    • Disease of thyroid gland    • Dysmenorrhea    • Dysuria    • Fibroids    • Heavy menses    • Hepatic steatosis    • Hyperglycemia    • Irregular menses    • Low back pain    • Lumbar spondylosis 7/30/2019   • Menometrorrhagia    • Nausea & vomiting    • Obesity    • Oligomenorrhea    • 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   • Right shoulder pain    • Uterine fibroid    • Wears glasses        Past Surgical History:   Procedure Laterality Date   • ABDOMINAL ADHESION SURGERY N/A 5/19/2017    Procedure: LYSIS ADHESIONS;  Surgeon: Dorothy Wright DO;  Location: [facility];  Service:    • CYSTOSCOPY N/A 5/19/2017    Procedure: Jose A Brightly;  Surgeon: Dorothy Wright DO;  Location: [facility];  Service:    • CYSTOSCOPY     • HYSTERECTOMY  2017   • DE COLONOSCOPY FLX DX W/COLLJ SPEC WHEN PFRMD N/A 4/24/2018    Procedure: COLONOSCOPY;  Surgeon: Vi Rodriguez MD;  Location: [facility]; Service: General   • DE LAPS TOTAL HYSTERECT 250 GM/< W/RMVL TUBE/OVARY N/A 5/19/2017    Procedure: HYSTERECTOMY LAPAROSCOPIC TOTAL ,BILATERAL SALPINGECTOMY;  Surgeon: Dorothy Wright DO;  Location: [facility];  Service: Gynecology       Family History   Problem Relation Age of Onset   • Diabetes Mother    • Hypertension Mother    • Hyperlipidemia Mother    • No Known Problems Father    • No Known Problems Brother    • No Known Problems Daughter    • Throat cancer Maternal Grandmother    • Stomach cancer Paternal Grandmother    • No Known Problems Daughter    • No Known Problems Daughter          Medications have been verified  Objective   Pulse 76   Temp 98 7 °F (37 1 °C)   Resp 18   Ht 5' 6" (1 676 m)   Wt 81 6 kg (180 lb)   LMP  (LMP Unknown)   SpO2 97%   BMI 29 05 kg/m²   No LMP recorded (lmp unknown)  Patient has had a hysterectomy  Physical Exam     Physical Exam  Vitals reviewed  Constitutional:       General: She is not in acute distress  Appearance: Normal appearance  She is not toxic-appearing  HENT:      Head: Normocephalic  Right Ear: Tympanic membrane normal  Tympanic membrane is not erythematous or bulging  Left Ear: Tympanic membrane normal  Tympanic membrane is not erythematous or bulging  Nose: No congestion or rhinorrhea        Right Sinus: No maxillary sinus tenderness or frontal sinus tenderness  Left Sinus: No maxillary sinus tenderness or frontal sinus tenderness  Mouth/Throat:      Pharynx: Uvula midline  No oropharyngeal exudate, posterior oropharyngeal erythema or uvula swelling  Tonsils: No tonsillar exudate or tonsillar abscesses  Eyes:      Extraocular Movements: Extraocular movements intact  Conjunctiva/sclera: Conjunctivae normal       Pupils: Pupils are equal, round, and reactive to light  Cardiovascular:      Rate and Rhythm: Normal rate and regular rhythm  Pulses: Normal pulses  Heart sounds: Normal heart sounds  Pulmonary:      Effort: Pulmonary effort is normal  No tachypnea or respiratory distress  Breath sounds: Normal breath sounds and air entry  No decreased breath sounds, wheezing, rhonchi or rales  Abdominal:      General: Bowel sounds are normal       Palpations: Abdomen is soft  Tenderness: There is no abdominal tenderness  There is no right CVA tenderness or guarding  Musculoskeletal:         General: Normal range of motion  Cervical back: Normal range of motion  Lymphadenopathy:      Cervical: No cervical adenopathy  Skin:     General: Skin is warm and dry  Neurological:      General: No focal deficit present  Mental Status: She is alert  Sensory: Sensation is intact  Motor: Motor function is intact  Coordination: Coordination is intact  Gait: Gait is intact  Deep Tendon Reflexes: Reflexes are normal and symmetric

## 2023-05-21 ENCOUNTER — APPOINTMENT (EMERGENCY)
Dept: CT IMAGING | Facility: HOSPITAL | Age: 48
End: 2023-05-21

## 2023-05-21 ENCOUNTER — HOSPITAL ENCOUNTER (EMERGENCY)
Facility: HOSPITAL | Age: 48
Discharge: HOME | End: 2023-05-21
Attending: EMERGENCY MEDICINE

## 2023-05-21 VITALS
OXYGEN SATURATION: 100 % | TEMPERATURE: 97.9 F | SYSTOLIC BLOOD PRESSURE: 118 MMHG | BODY MASS INDEX: 29.59 KG/M2 | RESPIRATION RATE: 16 BRPM | DIASTOLIC BLOOD PRESSURE: 78 MMHG | HEART RATE: 59 BPM | WEIGHT: 183.3 LBS

## 2023-05-21 DIAGNOSIS — M51.26 LUMBAR DISC HERNIATION: Primary | ICD-10-CM

## 2023-05-21 RX ORDER — GINSENG 100 MG
1 CAPSULE ORAL ONCE
Status: DISCONTINUED | OUTPATIENT
Start: 2023-05-21 | End: 2023-05-21

## 2023-05-21 RX ORDER — PREDNISONE 20 MG/1
20 TABLET ORAL DAILY
Qty: 15 TABLET | Refills: 0 | Status: SHIPPED | OUTPATIENT
Start: 2023-05-21

## 2023-05-21 RX ORDER — KETOROLAC TROMETHAMINE 30 MG/ML
15 INJECTION, SOLUTION INTRAMUSCULAR; INTRAVENOUS ONCE
Status: COMPLETED | OUTPATIENT
Start: 2023-05-21 | End: 2023-05-21

## 2023-05-21 RX ORDER — NAPROXEN 375 MG/1
375 TABLET ORAL 2 TIMES DAILY WITH MEALS
Qty: 20 TABLET | Refills: 0 | Status: SHIPPED | OUTPATIENT
Start: 2023-05-21 | End: 2023-05-21 | Stop reason: SDUPTHER

## 2023-05-21 RX ORDER — CYCLOBENZAPRINE HCL 10 MG
10 TABLET ORAL 2 TIMES DAILY PRN
Qty: 20 TABLET | Refills: 0 | Status: SHIPPED | OUTPATIENT
Start: 2023-05-21 | End: 2023-05-21 | Stop reason: SDUPTHER

## 2023-05-21 RX ORDER — CYCLOBENZAPRINE HCL 10 MG
10 TABLET ORAL ONCE
Status: COMPLETED | OUTPATIENT
Start: 2023-05-21 | End: 2023-05-21

## 2023-05-21 RX ORDER — CYCLOBENZAPRINE HCL 10 MG
10 TABLET ORAL 2 TIMES DAILY PRN
Qty: 20 TABLET | Refills: 0 | Status: SHIPPED | OUTPATIENT
Start: 2023-05-21

## 2023-05-21 RX ORDER — NAPROXEN 375 MG/1
375 TABLET ORAL 2 TIMES DAILY WITH MEALS
Qty: 20 TABLET | Refills: 0 | Status: SHIPPED | OUTPATIENT
Start: 2023-05-21

## 2023-05-21 RX ORDER — PREDNISONE 20 MG/1
20 TABLET ORAL DAILY
Qty: 15 TABLET | Refills: 0 | Status: SHIPPED | OUTPATIENT
Start: 2023-05-21 | End: 2023-05-21 | Stop reason: SDUPTHER

## 2023-05-21 RX ADMIN — KETOROLAC TROMETHAMINE 15 MG: 30 INJECTION, SOLUTION INTRAMUSCULAR; INTRAVENOUS at 12:02

## 2023-05-21 RX ADMIN — CYCLOBENZAPRINE HYDROCHLORIDE 10 MG: 10 TABLET, FILM COATED ORAL at 12:02

## 2023-05-21 RX ADMIN — PREDNISONE 50 MG: 20 TABLET ORAL at 13:31

## 2023-05-21 NOTE — Clinical Note
Karoline Curtis was seen and treated in our emergency department on 5/21/2023  Diagnosis:     Cleopatra Connolly  may return to work on return date  She may return on this date: 05/24/2023         If you have any questions or concerns, please don't hesitate to call        Kathleen Rodriguez MD    ______________________________           _______________          _______________  Hospital Representative                              Date                                Time

## 2023-05-21 NOTE — ED PROVIDER NOTES
History  Chief Complaint   Patient presents with   • Back Pain     Lower back pain that radiates down both legs     55 yo female presenting the emergency department with low back pain radiating to the bilateral legs  Notes that has been going on for the past 2 to 3 days  Has chronic back pain but worsened over the past few days  No trauma  No falls  No fever chills cough congestion rhinorrhea  No nausea vomiting diarrhea  No urinary or bowel incontinence  No weakness of the lower extremity  Prior to Admission Medications   Prescriptions Last Dose Informant Patient Reported? Taking?    ALPRAZolam (XANAX) 0 25 mg tablet   No No   Sig: Take 1 tablet (0 25 mg total) by mouth 2 (two) times a day as needed for anxiety for up to 7 days   Patient not taking: Reported on 1/15/2023   DULoxetine (CYMBALTA) 60 mg delayed release capsule   Yes No   Sig: Take 60 mg by mouth daily   Patient not taking: Reported on 1/15/2023   albuterol (2 5 mg/3 mL) 0 083 % nebulizer solution   No No   Sig: Take 1 vial (2 5 mg total) by nebulization every 4 (four) hours as needed for wheezing or shortness of breath   Patient not taking: Reported on 1/15/2023   albuterol (PROVENTIL HFA,VENTOLIN HFA) 90 mcg/act inhaler   No No   Sig: Inhale 2 puffs every 6 (six) hours as needed for wheezing   Patient not taking: Reported on 1/15/2023   busPIRone (BUSPAR) 5 mg tablet   Yes No   Sig: Take 5 mg by mouth 2 (two) times a day   Patient not taking: Reported on 1/15/2023   cetirizine (ZyrTEC) 10 mg tablet   No No   Sig: Take 1 tablet (10 mg total) by mouth daily   Patient not taking: Reported on 1/15/2023   diazepam (VALIUM) 5 mg tablet   No No   Sig: Take 1 tablet (5 mg total) by mouth 2 (two) times a day for 5 days   fluticasone (FLONASE) 50 mcg/act nasal spray   No No   Si sprays into each nostril daily   Patient not taking: Reported on 1/15/2023   hydrOXYzine HCL (ATARAX) 25 mg tablet   No No   Sig: Take 1 tablet (25 mg total) by mouth daily at bedtime   Patient not taking: Reported on 1/15/2023   levothyroxine 75 mcg tablet   Yes No   Sig: Take 75 mcg by mouth daily   methocarbamol (ROBAXIN) 500 mg tablet   No No   Sig: Take 1 tablet (500 mg total) by mouth 2 (two) times a day   Patient not taking: Reported on 1/15/2023   methocarbamol (ROBAXIN) 750 mg tablet   No No   Si/2 to 1 tablet every 6-8 hours or hs prn for muscle pain, spasms  Home use only  Patient not taking: Reported on 1/15/2023   naproxen (NAPROSYN) 500 mg tablet   No No   Sig: Take 1 tablet (500 mg total) by mouth 2 (two) times a day with meals   Patient not taking: Reported on 1/15/2023   naproxen (NAPROSYN) 500 mg tablet   No No   Sig: Take 1 tablet (500 mg total) by mouth 2 (two) times a day with meals   norethindrone (AYGESTIN) 5 mg tablet   No No   Sig: Take 1 tablet (5 mg total) by mouth daily   ondansetron (ZOFRAN) 4 mg tablet   Yes No   Sig: Take by mouth   Patient not taking: Reported on 1/15/2023   ondansetron (ZOFRAN-ODT) 4 mg disintegrating tablet   No No   Sig: Take 1 tablet (4 mg total) by mouth every 6 (six) hours as needed for nausea or vomiting   Patient not taking: Reported on 1/15/2023   predniSONE 20 mg tablet   No No   Sig: One po bid x 5 days    Take with food   Patient not taking: Reported on 2020   triamcinolone (KENALOG) 0 1 % cream   Yes No   Sig: Apply 1 application topically 2 (two) times a day   Patient not taking: Reported on 1/15/2023      Facility-Administered Medications: None       Past Medical History:   Diagnosis Date   • Abdominal pain    • Amenorrhea    • Bacterial vaginosis    • Breast pain    • Daytime somnolence    • Depression    • Diarrhea    • Disease of thyroid gland    • Dysmenorrhea    • Dysuria    • Fibroids    • Heavy menses    • Hepatic steatosis    • Hyperglycemia    • Irregular menses    • Low back pain    • Lumbar spondylosis 2019   • Menometrorrhagia    • Nausea & vomiting    • Obesity    • Oligomenorrhea • 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   • Right shoulder pain    • Uterine fibroid    • Wears glasses        Past Surgical History:   Procedure Laterality Date   • ABDOMINAL ADHESION SURGERY N/A 5/19/2017    Procedure: LYSIS ADHESIONS;  Surgeon: [facility] ;  Location: [facility];  Service:    • CYSTOSCOPY N/A 5/19/2017    Procedure: Rhae Carlson;  Surgeon: [facility] ;  Location: [facility];  Service:    • CYSTOSCOPY     • HYSTERECTOMY  2017   • TN COLONOSCOPY FLX DX W/COLLJ SPEC WHEN PFRMD N/A 4/24/2018    Procedure: COLONOSCOPY;  Surgeon: Mili Lopez MD;  Location: [facility]; Service: General   • TN LAPS TOTAL HYSTERECT 250 GM/< W/RMVL TUBE/OVARY N/A 5/19/2017    Procedure: HYSTERECTOMY LAPAROSCOPIC TOTAL ,BILATERAL SALPINGECTOMY;  Surgeon: [facility] ;  Location: [facility];  Service: Gynecology       Family History   Problem Relation Age of Onset   • Diabetes Mother    • Hypertension Mother    • Hyperlipidemia Mother    • No Known Problems Father    • No Known Problems Brother    • No Known Problems Daughter    • Throat cancer Maternal Grandmother    • Stomach cancer Paternal Grandmother    • No Known Problems Daughter    • No Known Problems Daughter      I have reviewed and agree with the history as documented  E-Cigarette/Vaping   • E-Cigarette Use Never User      E-Cigarette/Vaping Substances   • Nicotine No    • THC No    • CBD No    • Flavoring No    • Other No    • Unknown No      Social History     Tobacco Use   • Smoking status: Never   • Smokeless tobacco: Never   Vaping Use   • Vaping Use: Never used   Substance Use Topics   • Alcohol use: No     Comment: social   • Drug use: No       Review of Systems   Constitutional: Negative for chills and fever  HENT: Negative for ear pain and sore throat  Eyes: Negative for pain and visual disturbance  Respiratory: Negative for cough and shortness of breath      Cardiovascular: Negative for chest pain and palpitations  Gastrointestinal: Negative for abdominal pain and vomiting  Genitourinary: Negative for dysuria and hematuria  Musculoskeletal: Positive for back pain  Negative for arthralgias  Skin: Negative for color change and rash  Neurological: Negative for seizures and syncope  All other systems reviewed and are negative  Physical Exam  Physical Exam  Vitals and nursing note reviewed  Constitutional:       General: She is not in acute distress  Appearance: She is well-developed  HENT:      Head: Normocephalic and atraumatic  Eyes:      Conjunctiva/sclera: Conjunctivae normal    Cardiovascular:      Rate and Rhythm: Normal rate and regular rhythm  Heart sounds: No murmur heard  Pulmonary:      Effort: Pulmonary effort is normal  No respiratory distress  Breath sounds: Normal breath sounds  Abdominal:      Palpations: Abdomen is soft  Tenderness: There is no abdominal tenderness  Musculoskeletal:         General: No swelling  Cervical back: Neck supple  Comments: Midline L-spine tenderness otherwise no midline C or T-spine tenderness to palpation  Skin:     General: Skin is warm and dry  Capillary Refill: Capillary refill takes less than 2 seconds  Neurological:      General: No focal deficit present  Mental Status: She is alert and oriented to person, place, and time  Mental status is at baseline  Cranial Nerves: No cranial nerve deficit  Motor: No weakness     Psychiatric:         Mood and Affect: Mood normal          Vital Signs  ED Triage Vitals   Temperature Pulse Respirations Blood Pressure SpO2   05/21/23 1151 05/21/23 1151 05/21/23 1151 05/21/23 1151 05/21/23 1151   97 9 °F (36 6 °C) 59 16 118/78 100 %      Temp Source Heart Rate Source Patient Position - Orthostatic VS BP Location FiO2 (%)   05/21/23 1151 05/21/23 1151 05/21/23 1151 05/21/23 1151 --   Oral Monitor Sitting Left arm       Pain Score       05/21/23 1202       10 - Worst Possible Pain           Vitals:    05/21/23 1151   BP: 118/78   Pulse: 59   Patient Position - Orthostatic VS: Sitting         Visual Acuity      ED Medications  Medications   ketorolac (TORADOL) injection 15 mg (15 mg Intramuscular Given 5/21/23 1202)   cyclobenzaprine (FLEXERIL) tablet 10 mg (10 mg Oral Given 5/21/23 1202)   predniSONE tablet 50 mg (50 mg Oral Given 5/21/23 1331)       Diagnostic Studies  Results Reviewed     None                 CT spine lumbar without contrast   Final Result by Genesis Adrian MD (05/21 1307)      1  Partially corticated lucency involving the left L5 transverse process suggestive of an ununited secondary ossification center or remote injury  The lumbar vertebral body heights are maintained demonstrating no acute fracture or subluxation  2  Disc bulges are noted at the L4-5 and L5-S1 levels with mild central canal narrowing at L4-5  Mild bilateral L4-5 and L5-S1 neural foraminal narrowing  3  Left-sided pelvocaliectasis without significant ureteral dilatation, possibly related to left UPJ stricture            Workstation performed: [id number]                    Procedures  Procedures         ED Course                               SBIRT 22yo+    Flowsheet Row Most Recent Value   Initial Alcohol Screen: US AUDIT-C     1  How often do you have a drink containing alcohol? 0 Filed at: 05/21/2023 1159   2  How many drinks containing alcohol do you have on a typical day you are drinking? 0 Filed at: 05/21/2023 1159   3a  Male UNDER 65: How often do you have five or more drinks on one occasion? 0 Filed at: 05/21/2023 1159   3b  FEMALE Any Age, or MALE 65+: How often do you have 4 or more drinks on one occassion? 0 Filed at: 05/21/2023 1159   Audit-C Score 0 Filed at: 05/21/2023 1159   MAHENDRA: How many times in the past year have you    Used an illegal drug or used a prescription medication for non-medical reasons?  Never Filed at: 05/21/2023 1154 Medical Decision Making  60-year-old female presenting emerged department with low back pain radiating into bilateral legs  Differential includes disc herniation, fracture, nerve compression  CT L-spine shows disc herniation of the L-spine, likely cause of symptoms  Will prescribe steroids, Flexeril as well as NSAIDs  PCP follow-up  Amount and/or Complexity of Data Reviewed  Radiology: ordered  Risk  Prescription drug management  Disposition  Final diagnoses:   Lumbar disc herniation     Time reflects when diagnosis was documented in both MDM as applicable and the Disposition within this note     Time User Action Codes Description Comment    5/21/2023  1:34 PM Valentina Bull Add [M50 20] Cervical disc herniation     5/21/2023  1:34 PM Valentina Bull Remove [M50 20] Cervical disc herniation     5/21/2023  1:34 PM Valentina Bull Add [M51 26] Lumbar disc herniation       ED Disposition     ED Disposition   Discharge    Condition   Stable    Date/Time   Sun May 21, 2023  1:33 PM    Comment   Ann Sweeney discharge to home/self care  Follow-up Information     Follow up With Specialties Details Why [address]z MD    [place] [facility] [address] [phone]            Discharge Medication List as of 5/21/2023  1:35 PM      START taking these medications    Details   cyclobenzaprine (FLEXERIL) 10 mg tablet Take 1 tablet (10 mg total) by mouth 2 (two) times a day as needed for muscle spasms, Starting Sun 5/21/2023, Normal      !! naproxen (NAPROSYN) 375 mg tablet Take 1 tablet (375 mg total) by mouth 2 (two) times a day with meals, Starting Sun 5/21/2023, Normal      !! predniSONE 20 mg tablet Take 1 tablet (20 mg total) by mouth daily, Starting Sun 5/21/2023, Normal       !! - Potential duplicate medications found  Please discuss with provider        CONTINUE these medications which have NOT CHANGED    Details   albuterol (2 5 mg/3 mL) 0 083 % nebulizer solution Take 1 vial (2 5 mg total) by nebulization every 4 (four) hours as needed for wheezing or shortness of breath, Starting Sat 2/15/2020, Normal      albuterol (PROVENTIL HFA,VENTOLIN HFA) 90 mcg/act inhaler Inhale 2 puffs every 6 (six) hours as needed for wheezing, Starting Sat 2/15/2020, Normal      ALPRAZolam (XANAX) 0 25 mg tablet Take 1 tablet (0 25 mg total) by mouth 2 (two) times a day as needed for anxiety for up to 7 days, Starting Tue 10/22/2019, Until Sun 1/15/2023 at 2359, Print      busPIRone (BUSPAR) 5 mg tablet Take 5 mg by mouth 2 (two) times a day, Starting Mon 5/3/2021, Until Tue 5/3/2022, Historical Med      cetirizine (ZyrTEC) 10 mg tablet Take 1 tablet (10 mg total) by mouth daily, Starting Sat 2/15/2020, Normal      diazepam (VALIUM) 5 mg tablet Take 1 tablet (5 mg total) by mouth 2 (two) times a day for 5 days, Starting Wed 5/6/2020, Until Mon 5/11/2020, Print      DULoxetine (CYMBALTA) 60 mg delayed release capsule Take 60 mg by mouth daily, Starting Thu 1/31/2019, Historical Med      fluticasone (FLONASE) 50 mcg/act nasal spray 2 sprays into each nostril daily, Starting Sat 2/15/2020, Normal      hydrOXYzine HCL (ATARAX) 25 mg tablet Take 1 tablet (25 mg total) by mouth daily at bedtime, Starting Tue 8/27/2019, Normal      levothyroxine 75 mcg tablet Take 75 mcg by mouth daily, Starting Fri 9/2/2022, Historical Med      !! methocarbamol (ROBAXIN) 500 mg tablet Take 1 tablet (500 mg total) by mouth 2 (two) times a day, Starting Wed 5/6/2020, Print      !! methocarbamol (ROBAXIN) 750 mg tablet 1/2 to 1 tablet every 6-8 hours or hs prn for muscle pain, spasms    Home use only , Normal      !! naproxen (NAPROSYN) 500 mg tablet Take 1 tablet (500 mg total) by mouth 2 (two) times a day with meals, Starting Sat 6/20/2020, Print      !! naproxen (NAPROSYN) 500 mg tablet Take 1 tablet (500 mg total) by mouth 2 (two) times a day with meals, Starting Tue 11/8/2022, Normal norethindrone (AYGESTIN) 5 mg tablet Take 1 tablet (5 mg total) by mouth daily, Starting Tue 11/8/2022, Until Sun 1/15/2023, Normal      ondansetron (ZOFRAN) 4 mg tablet Take by mouth, Historical Med      ondansetron (ZOFRAN-ODT) 4 mg disintegrating tablet Take 1 tablet (4 mg total) by mouth every 6 (six) hours as needed for nausea or vomiting, Starting Wed 8/18/2021, Normal      !! predniSONE 20 mg tablet One po bid x 5 days  Take with food, Normal      triamcinolone (KENALOG) 0 1 % cream Apply 1 application topically 2 (two) times a day, Starting Sun 5/15/2022, Historical Med       !! - Potential duplicate medications found  Please discuss with provider                PDMP Review       Value Time User    PDMP Reviewed  Yes 10/22/2019 11:08 AM Katheen Castleman, [facility] Provider  Electronically Signed by           Dc Tay MD  05/21/23 2633

## 2023-05-22 ENCOUNTER — TELEPHONE (OUTPATIENT)
Dept: PHYSICAL THERAPY | Facility: OTHER | Age: 48
End: 2023-05-22

## 2023-05-22 NOTE — TELEPHONE ENCOUNTER
Call placed to the patient per [facility] referral     V/M left for patient to call back  Phone number and hours of business provided  This is the 1st attempt to reach the patient  Will defer referral per protocol

## 2023-05-26 NOTE — TELEPHONE ENCOUNTER
Call placed to the patient per [facility] referral     V/M left for patient to call back  Phone number and hours of business provided  This is the 2nd attempt to reach the patient  Will defer referral per protocol

## 2023-11-08 ENCOUNTER — HOSPITAL ENCOUNTER (EMERGENCY)
Facility: HOSPITAL | Age: 48
Discharge: HOME | End: 2023-11-08
Attending: EMERGENCY MEDICINE | Admitting: EMERGENCY MEDICINE
Payer: COMMERCIAL

## 2023-11-08 VITALS
RESPIRATION RATE: 18 BRPM | TEMPERATURE: 98.5 F | HEART RATE: 71 BPM | OXYGEN SATURATION: 100 % | DIASTOLIC BLOOD PRESSURE: 87 MMHG | BODY MASS INDEX: 31.3 KG/M2 | WEIGHT: 193.9 LBS | SYSTOLIC BLOOD PRESSURE: 169 MMHG

## 2023-11-08 DIAGNOSIS — M54.9 BACK PAIN: ICD-10-CM
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
## 2023-11-08 DIAGNOSIS — M54.2 NECK PAIN: ICD-10-CM

## 2023-11-08 PROCEDURE — 99284 EMERGENCY DEPT VISIT MOD MDM: CPT

## 2023-11-08 PROCEDURE — 99284 EMERGENCY DEPT VISIT MOD MDM: CPT | Performed by: EMERGENCY MEDICINE

## 2023-11-08 PROCEDURE — 96372 THER/PROPH/DIAG INJ SC/IM: CPT

## 2023-11-08 RX ORDER — ACETAMINOPHEN 325 MG/1
650 TABLET ORAL ONCE
Status: COMPLETED | OUTPATIENT
Start: 2023-11-08 | End: 2023-11-08

## 2023-11-08 RX ORDER — METHOCARBAMOL 500 MG/1
500 TABLET, FILM COATED ORAL 2 TIMES DAILY
Qty: 20 TABLET | Refills: 0 | Status: SHIPPED | OUTPATIENT
Start: 2023-11-08

## 2023-11-08 RX ORDER — KETOROLAC TROMETHAMINE 30 MG/ML
15 INJECTION, SOLUTION INTRAMUSCULAR; INTRAVENOUS ONCE
Status: COMPLETED | OUTPATIENT
Start: 2023-11-08 | End: 2023-11-08

## 2023-11-08 RX ADMIN — KETOROLAC TROMETHAMINE 15 MG: 30 INJECTION, SOLUTION INTRAMUSCULAR; INTRAVENOUS at 20:44

## 2023-11-08 RX ADMIN — ACETAMINOPHEN 650 MG: 325 TABLET ORAL at 20:43

## 2023-11-08 NOTE — Clinical Note
Fadi Beatakael was seen and treated in our emergency department on 11/8/2023. Diagnosis:     Sukumar Palomo  may return to work on return date. She may return on this date: 11/11/2023         If you have any questions or concerns, please don't hesitate to call.       Kerri Dennis MD    ______________________________           _______________          _______________  [facility] Representative                              Date                                Time
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
## 2024-02-21 PROBLEM — J01.00 ACUTE NON-RECURRENT MAXILLARY SINUSITIS: Status: RESOLVED | Noted: 2019-10-22 | Resolved: 2024-02-21

## 2025-02-02 ENCOUNTER — OFFICE VISIT (OUTPATIENT)
Dept: URGENT CARE | Age: 50
End: 2025-02-02
Payer: COMMERCIAL

## 2025-02-02 VITALS
BODY MASS INDEX: 32.14 KG/M2 | RESPIRATION RATE: 18 BRPM | DIASTOLIC BLOOD PRESSURE: 70 MMHG | HEART RATE: 66 BPM | SYSTOLIC BLOOD PRESSURE: 136 MMHG | OXYGEN SATURATION: 99 % | WEIGHT: 200 LBS | HEIGHT: 66 IN | TEMPERATURE: 97.2 F

## 2025-02-02 DIAGNOSIS — R51.9 ACUTE NONINTRACTABLE HEADACHE, UNSPECIFIED HEADACHE TYPE: Primary | ICD-10-CM

## 2025-02-02 DIAGNOSIS — R42 DIZZINESS: ICD-10-CM

## 2025-02-02 PROCEDURE — G0382 LEV 3 HOSP TYPE B ED VISIT: HCPCS

## 2025-02-02 PROCEDURE — S9083 URGENT CARE CENTER GLOBAL: HCPCS

## 2025-02-02 RX ORDER — DICLOFENAC POTASSIUM 50 MG/1
50 TABLET, FILM COATED ORAL 2 TIMES DAILY PRN
COMMUNITY
Start: 2024-08-29 | End: 2025-08-29

## 2025-02-02 NOTE — PROGRESS NOTES
[facility]        NAME: Ruth Ann Durán is a 49 y.o. female  : 1975    MRN: [id number]  DATE: 2025  TIME: 12:52 PM    Assessment and Plan   Acute nonintractable headache, unspecified headache type [R51.9]  1. Acute nonintractable headache, unspecified headache type        2. Dizziness          Discussed with patient at length risks vs. benefits of transferring to ER for further evaluation and treatment. Patient verbalized understanding. Patient states she will be self transporting via private vehicle, driven by her , to [facility].    Patient Instructions     Proceed to ER for further evaluation and treatment.    Chief Complaint     Chief Complaint   Patient presents with    Dizziness     Patient reports migraine, vomiting, diarrhea, and dizziness X 2 days. Patient reports has taken medication for migraine with no relief.         History of Present Illness       Patient is a 48 yo female with significant PMH of migraines and asthma presenting in the clinic today for dizziness x 2 days. Admits chills, cough, congestion, sore throat, ear pain, headache, nausea, vomiting (3 episodes of NBNB emesis), and diarrhea (2 episodes). Admits abdominal burning sensation. Admits tingling sensation of b/l hands with increased headache. Denies fever, body aches, visual changes, neck pain, chest pain, SOB, hematochezia, decreased urination, decreased fluid intake, and abdominal pain. Admits the use of aspirin, Flonase, albuterol inhaler, Tums, and OTC cold and flu medication for sx management. Positive recent sick contacts at work. Patient states this feels similar to prior migraines.        Review of Systems   Review of Systems   Constitutional:  Positive for chills. Negative for fatigue and fever.   HENT:  Positive for congestion, ear pain and sore throat. Negative for postnasal drip, rhinorrhea, sinus pressure and sinus pain.    Eyes:  Negative for visual disturbance.    Respiratory:  Positive for cough. Negative for shortness of breath.    Cardiovascular:  Negative for chest pain.   Gastrointestinal:  Positive for diarrhea, nausea and vomiting. Negative for abdominal pain.   Musculoskeletal:  Negative for myalgias.   Skin:  Negative for rash.   Neurological:  Positive for dizziness and headaches.         Current Medications       Current Outpatient Medications:     diclofenac potassium (CATAFLAM) 50 mg tablet, Take 50 mg by mouth 2 (two) times a day as needed, Disp: , Rfl:     levothyroxine 75 mcg tablet, Take 75 mcg by mouth daily, Disp: , Rfl:     methocarbamol (ROBAXIN) 500 mg tablet, Take 1 tablet (500 mg total) by mouth 2 (two) times a day, Disp: 20 tablet, Rfl: 0    naproxen (NAPROSYN) 375 mg tablet, Take 1 tablet (375 mg total) by mouth 2 (two) times a day with meals, Disp: 20 tablet, Rfl: 0    albuterol (2.5 mg/3 mL) 0.083 % nebulizer solution, Take 1 vial (2.5 mg total) by nebulization every 4 (four) hours as needed for wheezing or shortness of breath (Patient not taking: Reported on 2/2/2025), Disp: 60 vial, Rfl: 0    albuterol (PROVENTIL HFA,VENTOLIN HFA) 90 mcg/act inhaler, Inhale 2 puffs every 6 (six) hours as needed for wheezing (Patient not taking: Reported on 2/2/2025), Disp: 1 Inhaler, Rfl: 0    ALPRAZolam (XANAX) 0.25 mg tablet, Take 1 tablet (0.25 mg total) by mouth 2 (two) times a day as needed for anxiety for up to 7 days (Patient not taking: Reported on 1/15/2023), Disp: 14 tablet, Rfl: 0    busPIRone (BUSPAR) 5 mg tablet, Take 5 mg by mouth 2 (two) times a day (Patient not taking: Reported on 1/15/2023), Disp: , Rfl:     cetirizine (ZyrTEC) 10 mg tablet, Take 1 tablet (10 mg total) by mouth daily (Patient not taking: Reported on 2/2/2025), Disp: 30 tablet, Rfl: 0    cyclobenzaprine (FLEXERIL) 10 mg tablet, Take 1 tablet (10 mg total) by mouth 2 (two) times a day as needed for muscle spasms (Patient not taking: Reported on 2/2/2025), Disp: 20 tablet, Rfl:  0    diazepam (VALIUM) 5 mg tablet, Take 1 tablet (5 mg total) by mouth 2 (two) times a day for 5 days, Disp: 10 tablet, Rfl: 0    DULoxetine (CYMBALTA) 60 mg delayed release capsule, Take 60 mg by mouth daily (Patient not taking: Reported on 2/2/2025), Disp: , Rfl:     fluticasone (FLONASE) 50 mcg/act nasal spray, 2 sprays into each nostril daily (Patient not taking: Reported on 2/2/2025), Disp: 16 g, Rfl: 0    hydrOXYzine HCL (ATARAX) 25 mg tablet, Take 1 tablet (25 mg total) by mouth daily at bedtime (Patient not taking: Reported on 2/2/2025), Disp: 30 tablet, Rfl: 1    methocarbamol (ROBAXIN) 500 mg tablet, Take 1 tablet (500 mg total) by mouth 2 (two) times a day (Patient not taking: Reported on 2/2/2025), Disp: 20 tablet, Rfl: 0    methocarbamol (ROBAXIN) 750 mg tablet, 1/2 to 1 tablet every 6-8 hours or hs prn for muscle pain, spasms.  Home use only. (Patient not taking: Reported on 2/2/2025), Disp: 24 tablet, Rfl: 0    naproxen (NAPROSYN) 500 mg tablet, Take 1 tablet (500 mg total) by mouth 2 (two) times a day with meals (Patient not taking: Reported on 2/2/2025), Disp: 15 tablet, Rfl: 0    naproxen (NAPROSYN) 500 mg tablet, Take 1 tablet (500 mg total) by mouth 2 (two) times a day with meals (Patient not taking: Reported on 2/2/2025), Disp: 30 tablet, Rfl: 0    norethindrone (AYGESTIN) 5 mg tablet, Take 1 tablet (5 mg total) by mouth daily, Disp: 30 tablet, Rfl: 2    ondansetron (ZOFRAN) 4 mg tablet, Take by mouth (Patient not taking: Reported on 2/2/2025), Disp: , Rfl:     ondansetron (ZOFRAN-ODT) 4 mg disintegrating tablet, Take 1 tablet (4 mg total) by mouth every 6 (six) hours as needed for nausea or vomiting (Patient not taking: Reported on 2/2/2025), Disp: 20 tablet, Rfl: 0    predniSONE 20 mg tablet, One po bid x 5 days.  Take with food (Patient not taking: Reported on 2/2/2025), Disp: 10 tablet, Rfl: 0    predniSONE 20 mg tablet, Take 1 tablet (20 mg total) by mouth daily (Patient not taking:  Reported on 2/2/2025), Disp: 15 tablet, Rfl: 0    triamcinolone (KENALOG) 0.1 % cream, Apply 1 application topically 2 (two) times a day (Patient not taking: Reported on 2/2/2025), Disp: , Rfl:     Current Allergies     Allergies as of 02/02/2025    (No Known Allergies)            The following portions of the patient's history were reviewed and updated as appropriate: allergies, current medications, past family history, past medical history, past social history, past surgical history and problem list.     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    Right shoulder pain     Uterine fibroid     Wears glasses        Past Surgical History:   Procedure Laterality Date    ABDOMINAL ADHESION SURGERY N/A 5/19/2017    Procedure: LYSIS ADHESIONS;  Surgeon: C William Riedel, DO;  [facility];  Service:     CYSTOSCOPY N/A 5/19/2017    Procedure: CYSTOSCOPY;  Surgeon: C William Riedel, DO;  [facility];  Service:     CYSTOSCOPY      HYSTERECTOMY  2017    DE COLONOSCOPY FLX DX W/COLLJ SPEC WHEN PFRMD N/A 4/24/2018    Procedure: COLONOSCOPY;  Surgeon: Winsome Christine MD;  [facility];  Service: General    DE LAPS TOTAL HYSTERECT 250 GM/< W/RMVL TUBE/OVARY N/A 5/19/2017    Procedure: HYSTERECTOMY LAPAROSCOPIC TOTAL ,BILATERAL SALPINGECTOMY;  Surgeon: C William Riedel, DO;  [facility];  Service: Gynecology       Family History   Problem Relation Age of Onset    Diabetes Mother     Hypertension Mother     Hyperlipidemia Mother     No Known Problems Father     No Known  "Problems Brother     No Known Problems Daughter     Throat cancer Maternal Grandmother     Stomach cancer Paternal Grandmother     No Known Problems Daughter     No Known Problems Daughter          Medications have been verified.        Objective   /70   Pulse 66   Temp (!) 97.2 °F (36.2 °C)   Resp 18   Ht 5' 6\" (1.676 m)   Wt 90.7 kg (200 lb)   LMP  (LMP Unknown)   SpO2 99%   BMI 32.28 kg/m²        Physical Exam     Physical Exam  Vitals reviewed.   Constitutional:       General: She is not in acute distress.     Appearance: Normal appearance. She is normal weight. She is not ill-appearing.   HENT:      Head: Normocephalic.      Right Ear: Hearing, tympanic membrane, ear canal and external ear normal. No middle ear effusion. There is no impacted cerumen. Tympanic membrane is not erythematous or bulging.      Left Ear: Hearing, tympanic membrane, ear canal and external ear normal.  No middle ear effusion. There is no impacted cerumen. Tympanic membrane is not erythematous or bulging.      Nose: Congestion present. No rhinorrhea.      Right Sinus: No maxillary sinus tenderness or frontal sinus tenderness.      Left Sinus: No maxillary sinus tenderness or frontal sinus tenderness.      Mouth/Throat:      Lips: Pink.      Mouth: Mucous membranes are moist.      Pharynx: Oropharynx is clear. Uvula midline. No pharyngeal swelling, oropharyngeal exudate, posterior oropharyngeal erythema or uvula swelling.   Eyes:      General:         Right eye: No discharge.         Left eye: No discharge.      Extraocular Movements: Extraocular movements intact.      Conjunctiva/sclera: Conjunctivae normal.      Pupils: Pupils are equal, round, and reactive to light.   Cardiovascular:      Rate and Rhythm: Normal rate and regular rhythm.      Pulses: Normal pulses.      Heart sounds: Normal heart sounds. No murmur heard.     No friction rub. No gallop.   Pulmonary:      Effort: Pulmonary effort is normal. No respiratory " distress.      Breath sounds: Normal breath sounds. No stridor. No wheezing, rhonchi or rales.   Musculoskeletal:      Cervical back: Normal range of motion and neck supple. No tenderness.   Lymphadenopathy:      Cervical: No cervical adenopathy.   Skin:     General: Skin is warm.      Findings: No rash.   Neurological:      Mental Status: She is alert and oriented to person, place, and time.      Gait: Gait normal.   Psychiatric:         Mood and Affect: Mood normal.         Behavior: Behavior normal.

## 2025-03-31 ENCOUNTER — HOSPITAL ENCOUNTER (EMERGENCY)
Facility: HOSPITAL | Age: 50
Discharge: HOME | End: 2025-03-31
Attending: EMERGENCY MEDICINE | Admitting: EMERGENCY MEDICINE
Payer: COMMERCIAL

## 2025-03-31 VITALS
OXYGEN SATURATION: 100 % | TEMPERATURE: 98.1 F | RESPIRATION RATE: 18 BRPM | HEART RATE: 78 BPM | BODY MASS INDEX: 27.79 KG/M2 | WEIGHT: 172.18 LBS | SYSTOLIC BLOOD PRESSURE: 145 MMHG | DIASTOLIC BLOOD PRESSURE: 87 MMHG

## 2025-03-31 DIAGNOSIS — T50.901A ACCIDENTAL OVERDOSE, INITIAL ENCOUNTER: Primary | ICD-10-CM

## 2025-03-31 PROCEDURE — 99284 EMERGENCY DEPT VISIT MOD MDM: CPT | Performed by: EMERGENCY MEDICINE

## 2025-03-31 PROCEDURE — 99282 EMERGENCY DEPT VISIT SF MDM: CPT

## 2025-04-01 NOTE — ED PROVIDER NOTES
Time reflects when diagnosis was documented in both MDM as applicable and the Disposition within this note       Time User Action Codes Description Comment    3/31/2025  8:37 PM KarenLiong Add [T50.901A] Accidental overdose, initial encounter           ED Disposition       ED Disposition   Discharge    Condition   Stable    Date/Time   Mon Mar 31, 2025  8:37 PM    Comment   Ruth Ann Velabruna discharge to home/self care.                   Assessment & Plan       Medical Decision Making  Problems Addressed:  Accidental overdose, initial encounter: acute illness or injury     Details: First time use of marijuana.  No Si intent.  Stable after monitoring.   to assume responsibility of patient.         ED Course as of 04/01/25 1609   Mon Mar 31, 2025   2038 Feeling improved.   here for patient.       Medications - No data to display    ED Risk Strat Scores                            SBIRT 20yo+      Flowsheet Row Most Recent Value   Initial Alcohol Screen: US AUDIT-C     1. How often do you have a drink containing alcohol? 0 Filed at: 03/31/2025 2012   2. How many drinks containing alcohol do you have on a typical day you are drinking?  0 Filed at: 03/31/2025 2012   3b. FEMALE Any Age, or MALE 65+: How often do you have 4 or more drinks on one occassion? 0 Filed at: 03/31/2025 2012   Audit-C Score 0 Filed at: 03/31/2025 2012   MAHENDRA: How many times in the past year have you...    Used an illegal drug or used a prescription medication for non-medical reasons? Never Filed at: 03/31/2025 2012                            History of Present Illness       Chief Complaint   Patient presents with    Recreational Drug Use     Pt states her friend gave her 1 THC gummy and is feeling a little short of breath.        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    Right shoulder pain     Uterine fibroid     Wears glasses       Past Surgical History:   Procedure Laterality Date    ABDOMINAL ADHESION SURGERY N/A 5/19/2017    Procedure: LYSIS ADHESIONS;  Surgeon: C William Riedel, DO;  [facility];  Service:     CYSTOSCOPY N/A 5/19/2017    Procedure: CYSTOSCOPY;  Surgeon: C William Riedel, DO;  [facility];  Service:     CYSTOSCOPY      HYSTERECTOMY  2017    WI COLONOSCOPY FLX DX W/COLLJ SPEC WHEN PFRMD N/A 4/24/2018    Procedure: COLONOSCOPY;  Surgeon: Winsome Christine MD;  [facility];  Service: General    WI LAPS TOTAL HYSTERECT 250 GM/< W/RMVL TUBE/OVARY N/A 5/19/2017    Procedure: HYSTERECTOMY LAPAROSCOPIC TOTAL ,BILATERAL SALPINGECTOMY;  Surgeon: C William Riedel, DO;  [facility];  Service: Gynecology      Family History   Problem Relation Age of Onset    Diabetes Mother     Hypertension Mother     Hyperlipidemia Mother     No Known Problems Father     No Known Problems Brother     No Known Problems Daughter     Throat cancer Maternal Grandmother     Stomach cancer Paternal Grandmother     No Known Problems Daughter     No Known Problems Daughter       Social History     Tobacco Use    Smoking status: Never    Smokeless tobacco: Never   Vaping Use    Vaping status: Never Used   Substance Use Topics    Alcohol use: No     Comment: social    Drug use: No      E-Cigarette/Vaping    E-Cigarette Use Never User       E-Cigarette/Vaping Substances    Nicotine No     THC No     CBD No     Flavoring No     Other No     Unknown No       I have reviewed and agree with the history as documented.     Patient is a 49-year-old female who presents c/o feeling stranger  after taking a marijuana gummy a friend gave her.  Has never tried marijuana.  No other drugs/etoh.          Review of Systems   Reason unable to perform ROS: intoxication.           Objective       ED Triage Vitals   Temperature Pulse Blood Pressure Respirations SpO2 Patient Position - Orthostatic VS   03/31/25 2007 03/31/25 2007 03/31/25 2007 03/31/25 2007 03/31/25 2007 03/31/25 2035   98.1 °F (36.7 °C) 90 162/92 16 96 % Lying      Temp Source Heart Rate Source BP Location FiO2 (%) Pain Score    03/31/25 2007 03/31/25 2035 03/31/25 2035 -- --    Oral Monitor Left arm        Vitals      Date and Time Temp Pulse SpO2 Resp BP Pain Score FACES Pain Rating User   03/31/25 2035 -- 78 100 % 18 145/87 -- -- [place]   03/31/25 2007 98.1 °F (36.7 °C) 90 96 % 16 162/92 -- -- [place]            Physical Exam  Vitals and nursing note reviewed.   Constitutional:       Appearance: Normal appearance.   Eyes:      Conjunctiva/sclera: Conjunctivae normal.      Pupils: Pupils are equal, round, and reactive to light.   Cardiovascular:      Rate and Rhythm: Normal rate and regular rhythm.      Pulses: Normal pulses.      Heart sounds: Normal heart sounds.   Pulmonary:      Effort: Pulmonary effort is normal.      Breath sounds: Normal breath sounds.   Abdominal:      General: Bowel sounds are normal.      Palpations: Abdomen is soft.   Musculoskeletal:         General: Normal range of motion.      Cervical back: Normal range of motion and neck supple.   Skin:     General: Skin is warm and dry.      Capillary Refill: Capillary refill takes less than 2 seconds.   Neurological:      General: No focal deficit present.      Mental Status: She is alert and oriented to person, place, and time.      Comments: Slow but appropriate         Results Reviewed       None            No orders to display       Procedures    ED Medication and Procedure Management   Prior to Admission Medications   Prescriptions Last Dose Informant Patient Reported? Taking?    ALPRAZolam (XANAX) 0.25 mg tablet   No No   Sig: Take 1 tablet (0.25 mg total) by mouth 2 (two) times a day as needed for anxiety for up to 7 days   Patient not taking: Reported on 1/15/2023   DULoxetine (CYMBALTA) 60 mg delayed release capsule   Yes No   Sig: Take 60 mg by mouth daily   Patient not taking: Reported on 2025   albuterol (2.5 mg/3 mL) 0.083 % nebulizer solution   No No   Sig: Take 1 vial (2.5 mg total) by nebulization every 4 (four) hours as needed for wheezing or shortness of breath   Patient not taking: Reported on 2025   albuterol (PROVENTIL HFA,VENTOLIN HFA) 90 mcg/act inhaler   No No   Sig: Inhale 2 puffs every 6 (six) hours as needed for wheezing   Patient not taking: Reported on 2025   busPIRone (BUSPAR) 5 mg tablet   Yes No   Sig: Take 5 mg by mouth 2 (two) times a day   Patient not taking: Reported on 1/15/2023   cetirizine (ZyrTEC) 10 mg tablet   No No   Sig: Take 1 tablet (10 mg total) by mouth daily   Patient not taking: Reported on 2025   cyclobenzaprine (FLEXERIL) 10 mg tablet   No No   Sig: Take 1 tablet (10 mg total) by mouth 2 (two) times a day as needed for muscle spasms   Patient not taking: Reported on 2025   diazepam (VALIUM) 5 mg tablet   No No   Sig: Take 1 tablet (5 mg total) by mouth 2 (two) times a day for 5 days   diclofenac potassium (CATAFLAM) 50 mg tablet   Yes No   Sig: Take 50 mg by mouth 2 (two) times a day as needed   fluticasone (FLONASE) 50 mcg/act nasal spray   No No   Si sprays into each nostril daily   Patient not taking: Reported on 2025   hydrOXYzine HCL (ATARAX) 25 mg tablet   No No   Sig: Take 1 tablet (25 mg total) by mouth daily at bedtime   Patient not taking: Reported on 2025   levothyroxine 75 mcg tablet   Yes No   Sig: Take 75 mcg by mouth daily   methocarbamol (ROBAXIN) 500 mg tablet   No No   Sig: Take 1 tablet (500 mg total) by mouth 2 (two) times a day   Patient not taking: Reported on 2025   methocarbamol  (ROBAXIN) 500 mg tablet   No No   Sig: Take 1 tablet (500 mg total) by mouth 2 (two) times a day   methocarbamol (ROBAXIN) 750 mg tablet   No No   Si/2 to 1 tablet every 6-8 hours or hs prn for muscle pain, spasms.  Home use only.   Patient not taking: Reported on 2025   naproxen (NAPROSYN) 375 mg tablet   No No   Sig: Take 1 tablet (375 mg total) by mouth 2 (two) times a day with meals   naproxen (NAPROSYN) 500 mg tablet   No No   Sig: Take 1 tablet (500 mg total) by mouth 2 (two) times a day with meals   Patient not taking: Reported on 2025   naproxen (NAPROSYN) 500 mg tablet   No No   Sig: Take 1 tablet (500 mg total) by mouth 2 (two) times a day with meals   Patient not taking: Reported on 2025   norethindrone (AYGESTIN) 5 mg tablet   No No   Sig: Take 1 tablet (5 mg total) by mouth daily   ondansetron (ZOFRAN) 4 mg tablet   Yes No   Sig: Take by mouth   Patient not taking: Reported on 2025   ondansetron (ZOFRAN-ODT) 4 mg disintegrating tablet   No No   Sig: Take 1 tablet (4 mg total) by mouth every 6 (six) hours as needed for nausea or vomiting   Patient not taking: Reported on 2025   predniSONE 20 mg tablet   No No   Sig: One po bid x 5 days.  Take with food   Patient not taking: Reported on 2025   predniSONE 20 mg tablet   No No   Sig: Take 1 tablet (20 mg total) by mouth daily   Patient not taking: Reported on 2025   triamcinolone (KENALOG) 0.1 % cream   Yes No   Sig: Apply 1 application topically 2 (two) times a day   Patient not taking: Reported on 2025      Facility-Administered Medications: None     Discharge Medication List as of 3/31/2025  8:37 PM        CONTINUE these medications which have NOT CHANGED    Details   albuterol (2.5 mg/3 mL) 0.083 % nebulizer solution Take 1 vial (2.5 mg total) by nebulization every 4 (four) hours as needed for wheezing or shortness of breath, Starting Sat 2/15/2020, Normal      albuterol (PROVENTIL HFA,VENTOLIN HFA) 90 mcg/act inhaler  Inhale 2 puffs every 6 (six) hours as needed for wheezing, Starting Sat 2/15/2020, Normal      ALPRAZolam (XANAX) 0.25 mg tablet Take 1 tablet (0.25 mg total) by mouth 2 (two) times a day as needed for anxiety for up to 7 days, Starting Tue 10/22/2019, Until Sun 1/15/2023 at 2359, Print      busPIRone (BUSPAR) 5 mg tablet Take 5 mg by mouth 2 (two) times a day, Starting Mon 5/3/2021, Until Tue 5/3/2022, Historical Med      cetirizine (ZyrTEC) 10 mg tablet Take 1 tablet (10 mg total) by mouth daily, Starting Sat 2/15/2020, Normal      cyclobenzaprine (FLEXERIL) 10 mg tablet Take 1 tablet (10 mg total) by mouth 2 (two) times a day as needed for muscle spasms, Starting Sun 5/21/2023, Normal      diazepam (VALIUM) 5 mg tablet Take 1 tablet (5 mg total) by mouth 2 (two) times a day for 5 days, Starting Wed 5/6/2020, Until Mon 5/11/2020, Print      diclofenac potassium (CATAFLAM) 50 mg tablet Take 50 mg by mouth 2 (two) times a day as needed, Starting Thu 8/29/2024, Until Fri 8/29/2025 at 2359, Historical Med      DULoxetine (CYMBALTA) 60 mg delayed release capsule Take 60 mg by mouth daily, Starting Thu 1/31/2019, Historical Med      fluticasone (FLONASE) 50 mcg/act nasal spray 2 sprays into each nostril daily, Starting Sat 2/15/2020, Normal      hydrOXYzine HCL (ATARAX) 25 mg tablet Take 1 tablet (25 mg total) by mouth daily at bedtime, Starting Tue 8/27/2019, Normal      levothyroxine 75 mcg tablet Take 75 mcg by mouth daily, Starting Fri 9/2/2022, Historical Med      !! methocarbamol (ROBAXIN) 500 mg tablet Take 1 tablet (500 mg total) by mouth 2 (two) times a day, Starting Wed 5/6/2020, Print      !! methocarbamol (ROBAXIN) 500 mg tablet Take 1 tablet (500 mg total) by mouth 2 (two) times a day, Starting Wed 11/8/2023, Normal      !! methocarbamol (ROBAXIN) 750 mg tablet 1/2 to 1 tablet every 6-8 hours or hs prn for muscle pain, spasms.  Home use only., Normal      !! naproxen (NAPROSYN) 375 mg tablet Take 1 tablet  (375 mg total) by mouth 2 (two) times a day with meals, Starting Sun 5/21/2023, Normal      !! naproxen (NAPROSYN) 500 mg tablet Take 1 tablet (500 mg total) by mouth 2 (two) times a day with meals, Starting Sat 6/20/2020, Print      !! naproxen (NAPROSYN) 500 mg tablet Take 1 tablet (500 mg total) by mouth 2 (two) times a day with meals, Starting Tue 11/8/2022, Normal      norethindrone (AYGESTIN) 5 mg tablet Take 1 tablet (5 mg total) by mouth daily, Starting Tue 11/8/2022, Until Sun 1/15/2023, Normal      ondansetron (ZOFRAN) 4 mg tablet Take by mouth, Historical Med      ondansetron (ZOFRAN-ODT) 4 mg disintegrating tablet Take 1 tablet (4 mg total) by mouth every 6 (six) hours as needed for nausea or vomiting, Starting Wed 8/18/2021, Normal      !! predniSONE 20 mg tablet One po bid x 5 days.  Take with food, Normal      !! predniSONE 20 mg tablet Take 1 tablet (20 mg total) by mouth daily, Starting Sun 5/21/2023, Normal      triamcinolone (KENALOG) 0.1 % cream Apply 1 application topically 2 (two) times a day, Starting Sun 5/15/2022, Historical Med       !! - Potential duplicate medications found. Please discuss with provider.        No discharge procedures on file.  ED SEPSIS DOCUMENTATION   Time reflects when diagnosis was documented in both MDM as applicable and the Disposition within this note       Time User Action Codes Description Comment    3/31/2025  8:37 PM Gagandeep Jones Add [T50.901A] Accidental overdose, initial encounter                  Gagandeep Jones MD  04/01/25 4010

## 2025-04-11 ENCOUNTER — HOSPITAL ENCOUNTER (INPATIENT)
Facility: HOSPITAL | Age: 50
LOS: 1 days | Discharge: HOME | End: 2025-04-14
Attending: EMERGENCY MEDICINE | Admitting: INTERNAL MEDICINE
Payer: COMMERCIAL

## 2025-04-11 ENCOUNTER — APPOINTMENT (EMERGENCY)
Dept: RADIOLOGY | Facility: HOSPITAL | Age: 50
End: 2025-04-11
Payer: COMMERCIAL

## 2025-04-11 DIAGNOSIS — J11.1 FLU: ICD-10-CM

## 2025-04-11 DIAGNOSIS — J45.901 ASTHMA EXACERBATION: Primary | ICD-10-CM

## 2025-04-11 PROBLEM — G89.29 CHRONIC BILATERAL LOW BACK PAIN WITHOUT SCIATICA: Status: ACTIVE | Noted: 2018-06-05

## 2025-04-11 PROBLEM — E87.6 HYPOKALEMIA: Status: ACTIVE | Noted: 2025-04-11

## 2025-04-11 PROBLEM — J45.41 MODERATE PERSISTENT ASTHMA WITH ACUTE EXACERBATION: Status: ACTIVE | Noted: 2025-04-11

## 2025-04-11 PROBLEM — J10.1 INFLUENZA A: Status: ACTIVE | Noted: 2025-04-11

## 2025-04-11 LAB
ALBUMIN SERPL BCG-MCNC: 4.1 G/DL (ref 3.5–5)
ALP SERPL-CCNC: 93 U/L (ref 34–104)
ALT SERPL W P-5'-P-CCNC: 34 U/L (ref 7–52)
ANION GAP SERPL CALCULATED.3IONS-SCNC: 12 MMOL/L (ref 4–13)
AST SERPL W P-5'-P-CCNC: 32 U/L (ref 13–39)
BASOPHILS # BLD AUTO: 0.02 THOUSANDS/ÂΜL (ref 0–0.1)
BASOPHILS NFR BLD AUTO: 0 % (ref 0–1)
BILIRUB DIRECT SERPL-MCNC: 0.1 MG/DL (ref 0–0.2)
BILIRUB SERPL-MCNC: 0.43 MG/DL (ref 0.2–1)
BUN SERPL-MCNC: 9 MG/DL (ref 5–25)
CALCIUM SERPL-MCNC: 9 MG/DL (ref 8.4–10.2)
CARDIAC TROPONIN I PNL SERPL HS: 3 NG/L (ref ?–50)
CHLORIDE SERPL-SCNC: 100 MMOL/L (ref 96–108)
CO2 SERPL-SCNC: 23 MMOL/L (ref 21–32)
CREAT SERPL-MCNC: 0.88 MG/DL (ref 0.6–1.3)
EOSINOPHIL # BLD AUTO: 0.02 THOUSAND/ÂΜL (ref 0–0.61)
EOSINOPHIL NFR BLD AUTO: 0 % (ref 0–6)
ERYTHROCYTE [DISTWIDTH] IN BLOOD BY AUTOMATED COUNT: 11.9 % (ref 11.6–15.1)
FLUAV AG UPPER RESP QL IA.RAPID: POSITIVE
FLUBV AG UPPER RESP QL IA.RAPID: NEGATIVE
GFR SERPL CREATININE-BSD FRML MDRD: 77 ML/MIN/1.73SQ M
GLUCOSE SERPL-MCNC: 126 MG/DL (ref 65–140)
HCT VFR BLD AUTO: 39.3 % (ref 34.8–46.1)
HGB BLD-MCNC: 13.6 G/DL (ref 11.5–15.4)
IMM GRANULOCYTES # BLD AUTO: 0.02 THOUSAND/UL (ref 0–0.2)
IMM GRANULOCYTES NFR BLD AUTO: 0 % (ref 0–2)
LYMPHOCYTES # BLD AUTO: 1.07 THOUSANDS/ÂΜL (ref 0.6–4.47)
LYMPHOCYTES NFR BLD AUTO: 14 % (ref 14–44)
MCH RBC QN AUTO: 31.6 PG (ref 26.8–34.3)
MCHC RBC AUTO-ENTMCNC: 34.6 G/DL (ref 31.4–37.4)
MCV RBC AUTO: 91 FL (ref 82–98)
MONOCYTES # BLD AUTO: 0.62 THOUSAND/ÂΜL (ref 0.17–1.22)
MONOCYTES NFR BLD AUTO: 8 % (ref 4–12)
NEUTROPHILS # BLD AUTO: 5.82 THOUSANDS/ÂΜL (ref 1.85–7.62)
NEUTS SEG NFR BLD AUTO: 78 % (ref 43–75)
NRBC BLD AUTO-RTO: 0 /100 WBCS
PLATELET # BLD AUTO: 221 THOUSANDS/UL (ref 149–390)
PMV BLD AUTO: 10.1 FL (ref 8.9–12.7)
POTASSIUM SERPL-SCNC: 3.2 MMOL/L (ref 3.5–5.3)
PROT SERPL-MCNC: 7.1 G/DL (ref 6.4–8.4)
RBC # BLD AUTO: 4.3 MILLION/UL (ref 3.81–5.12)
SALICYLATES SERPL-MCNC: <5 MG/DL (ref 3–20)
SARS-COV+SARS-COV-2 AG RESP QL IA.RAPID: NEGATIVE
SODIUM SERPL-SCNC: 135 MMOL/L (ref 135–147)
WBC # BLD AUTO: 7.57 THOUSAND/UL (ref 4.31–10.16)

## 2025-04-11 PROCEDURE — 83735 ASSAY OF MAGNESIUM: CPT | Performed by: NURSE PRACTITIONER

## 2025-04-11 PROCEDURE — 80179 DRUG ASSAY SALICYLATE: CPT | Performed by: EMERGENCY MEDICINE

## 2025-04-11 PROCEDURE — 87811 SARS-COV-2 COVID19 W/OPTIC: CPT | Performed by: EMERGENCY MEDICINE

## 2025-04-11 PROCEDURE — 87651 STREP A DNA AMP PROBE: CPT | Performed by: NURSE PRACTITIONER

## 2025-04-11 PROCEDURE — 99222 1ST HOSP IP/OBS MODERATE 55: CPT | Performed by: INTERNAL MEDICINE

## 2025-04-11 PROCEDURE — 94760 N-INVAS EAR/PLS OXIMETRY 1: CPT

## 2025-04-11 PROCEDURE — 84484 ASSAY OF TROPONIN QUANT: CPT | Performed by: EMERGENCY MEDICINE

## 2025-04-11 PROCEDURE — 96375 TX/PRO/DX INJ NEW DRUG ADDON: CPT

## 2025-04-11 PROCEDURE — 71045 X-RAY EXAM CHEST 1 VIEW: CPT

## 2025-04-11 PROCEDURE — 93005 ELECTROCARDIOGRAM TRACING: CPT

## 2025-04-11 PROCEDURE — 96365 THER/PROPH/DIAG IV INF INIT: CPT

## 2025-04-11 PROCEDURE — 96367 TX/PROPH/DG ADDL SEQ IV INF: CPT

## 2025-04-11 PROCEDURE — 94640 AIRWAY INHALATION TREATMENT: CPT

## 2025-04-11 PROCEDURE — 96372 THER/PROPH/DIAG INJ SC/IM: CPT

## 2025-04-11 PROCEDURE — 87804 INFLUENZA ASSAY W/OPTIC: CPT | Performed by: EMERGENCY MEDICINE

## 2025-04-11 PROCEDURE — 85025 COMPLETE CBC W/AUTO DIFF WBC: CPT | Performed by: EMERGENCY MEDICINE

## 2025-04-11 PROCEDURE — 99291 CRITICAL CARE FIRST HOUR: CPT | Performed by: EMERGENCY MEDICINE

## 2025-04-11 PROCEDURE — 84145 PROCALCITONIN (PCT): CPT | Performed by: NURSE PRACTITIONER

## 2025-04-11 PROCEDURE — 99285 EMERGENCY DEPT VISIT HI MDM: CPT

## 2025-04-11 PROCEDURE — 80076 HEPATIC FUNCTION PANEL: CPT | Performed by: EMERGENCY MEDICINE

## 2025-04-11 PROCEDURE — 80048 BASIC METABOLIC PNL TOTAL CA: CPT | Performed by: EMERGENCY MEDICINE

## 2025-04-11 PROCEDURE — 36415 COLL VENOUS BLD VENIPUNCTURE: CPT | Performed by: EMERGENCY MEDICINE

## 2025-04-11 RX ORDER — METHYLPREDNISOLONE SODIUM SUCCINATE 125 MG/2ML
125 INJECTION, POWDER, LYOPHILIZED, FOR SOLUTION INTRAMUSCULAR; INTRAVENOUS ONCE
Status: COMPLETED | OUTPATIENT
Start: 2025-04-11 | End: 2025-04-11

## 2025-04-11 RX ORDER — SENNA AND DOCUSATE SODIUM 50; 8.6 MG/1; MG/1
1 TABLET, FILM COATED ORAL 2 TIMES DAILY
COMMUNITY

## 2025-04-11 RX ORDER — EPINEPHRINE 1 MG/ML
0.3 INJECTION, SOLUTION, CONCENTRATE INTRAVENOUS ONCE
Status: COMPLETED | OUTPATIENT
Start: 2025-04-11 | End: 2025-04-11

## 2025-04-11 RX ORDER — SODIUM CHLORIDE FOR INHALATION 0.9 %
12 VIAL, NEBULIZER (ML) INHALATION ONCE
Status: COMPLETED | OUTPATIENT
Start: 2025-04-11 | End: 2025-04-11

## 2025-04-11 RX ORDER — POTASSIUM CHLORIDE 14.9 MG/ML
20 INJECTION INTRAVENOUS ONCE
Status: COMPLETED | OUTPATIENT
Start: 2025-04-11 | End: 2025-04-12

## 2025-04-11 RX ORDER — MAGNESIUM SULFATE HEPTAHYDRATE 40 MG/ML
2 INJECTION, SOLUTION INTRAVENOUS ONCE
Status: COMPLETED | OUTPATIENT
Start: 2025-04-11 | End: 2025-04-11

## 2025-04-11 RX ORDER — DOCUSATE SODIUM 100 MG/1
100 CAPSULE, LIQUID FILLED ORAL 2 TIMES DAILY
COMMUNITY

## 2025-04-11 RX ORDER — ALBUTEROL SULFATE 0.83 MG/ML
5 SOLUTION RESPIRATORY (INHALATION) ONCE
Status: COMPLETED | OUTPATIENT
Start: 2025-04-11 | End: 2025-04-11

## 2025-04-11 RX ORDER — ONDANSETRON 2 MG/ML
4 INJECTION INTRAMUSCULAR; INTRAVENOUS ONCE
Status: COMPLETED | OUTPATIENT
Start: 2025-04-11 | End: 2025-04-11

## 2025-04-11 RX ORDER — ALBUTEROL SULFATE 5 MG/ML
10 SOLUTION RESPIRATORY (INHALATION) ONCE
Status: COMPLETED | OUTPATIENT
Start: 2025-04-11 | End: 2025-04-11

## 2025-04-11 RX ORDER — IBUPROFEN 800 MG/1
800 TABLET, FILM COATED ORAL EVERY 6 HOURS PRN
COMMUNITY

## 2025-04-11 RX ORDER — FLUTICASONE PROPIONATE AND SALMETEROL 100; 50 UG/1; UG/1
1 POWDER RESPIRATORY (INHALATION) 2 TIMES DAILY
COMMUNITY

## 2025-04-11 RX ORDER — LEVOTHYROXINE SODIUM 88 UG/1
1 TABLET ORAL DAILY
COMMUNITY
Start: 2025-03-06

## 2025-04-11 RX ADMIN — EPINEPHRINE 0.3 MG: 1 INJECTION, SOLUTION, CONCENTRATE INTRAVENOUS at 21:46

## 2025-04-11 RX ADMIN — ISODIUM CHLORIDE 12 ML: 0.03 SOLUTION RESPIRATORY (INHALATION) at 21:55

## 2025-04-11 RX ADMIN — ALBUTEROL SULFATE 10 MG: 2.5 SOLUTION RESPIRATORY (INHALATION) at 21:55

## 2025-04-11 RX ADMIN — IPRATROPIUM BROMIDE 0.5 MG: 0.5 SOLUTION RESPIRATORY (INHALATION) at 21:31

## 2025-04-11 RX ADMIN — ALBUTEROL SULFATE 5 MG: 2.5 SOLUTION RESPIRATORY (INHALATION) at 21:30

## 2025-04-11 RX ADMIN — IPRATROPIUM BROMIDE 1 MG: 0.5 SOLUTION RESPIRATORY (INHALATION) at 21:55

## 2025-04-11 RX ADMIN — MAGNESIUM SULFATE HEPTAHYDRATE 2 G: 40 INJECTION, SOLUTION INTRAVENOUS at 21:40

## 2025-04-11 RX ADMIN — POTASSIUM CHLORIDE 20 MEQ: 200 INJECTION, SOLUTION INTRAVENOUS at 22:09

## 2025-04-11 RX ADMIN — METHYLPREDNISOLONE SODIUM SUCCINATE 125 MG: 125 INJECTION, POWDER, FOR SOLUTION INTRAMUSCULAR; INTRAVENOUS at 21:41

## 2025-04-11 RX ADMIN — SODIUM CHLORIDE 1000 ML: 0.9 INJECTION, SOLUTION INTRAVENOUS at 21:38

## 2025-04-11 RX ADMIN — ONDANSETRON 4 MG: 2 INJECTION INTRAMUSCULAR; INTRAVENOUS at 22:13

## 2025-04-11 NOTE — LETTER
[place] [id number]  [address] [facility] [address]  Dept: [phone]    April 14, 2025     Patient: Ruth Ann Durán   YOB: 1975   Date of Visit: 4/11/2025       To Whom it May Concern:    Ruth Ann Durán is under my professional care. She was seen in the hospital from 4/11/2025 to 04/14/25. She may return to work on 4/20/25 without limitations.    If you have any questions or concerns, please don't hesitate to call.         Sincerely,          Eusebia Marsh PA-C

## 2025-04-12 PROBLEM — F41.9 ANXIETY: Status: RESOLVED | Noted: 2019-10-22 | Resolved: 2025-04-12

## 2025-04-12 LAB
ANION GAP SERPL CALCULATED.3IONS-SCNC: 10 MMOL/L (ref 4–13)
ATRIAL RATE: 99 BPM
BACTERIA SPT RESP CULT: ABNORMAL
BASOPHILS # BLD AUTO: 0.01 THOUSANDS/ÂΜL (ref 0–0.1)
BASOPHILS NFR BLD AUTO: 0 % (ref 0–1)
BUN SERPL-MCNC: 9 MG/DL (ref 5–25)
CALCIUM SERPL-MCNC: 8.6 MG/DL (ref 8.4–10.2)
CHLORIDE SERPL-SCNC: 105 MMOL/L (ref 96–108)
CO2 SERPL-SCNC: 22 MMOL/L (ref 21–32)
CREAT SERPL-MCNC: 0.96 MG/DL (ref 0.6–1.3)
EOSINOPHIL # BLD AUTO: 0 THOUSAND/ÂΜL (ref 0–0.61)
EOSINOPHIL NFR BLD AUTO: 0 % (ref 0–6)
ERYTHROCYTE [DISTWIDTH] IN BLOOD BY AUTOMATED COUNT: 12.1 % (ref 11.6–15.1)
GFR SERPL CREATININE-BSD FRML MDRD: 70 ML/MIN/1.73SQ M
GLUCOSE P FAST SERPL-MCNC: 213 MG/DL (ref 65–99)
GLUCOSE SERPL-MCNC: 127 MG/DL (ref 65–140)
GLUCOSE SERPL-MCNC: 213 MG/DL (ref 65–140)
GRAM STN SPEC: ABNORMAL
HCT VFR BLD AUTO: 36.6 % (ref 34.8–46.1)
HGB BLD-MCNC: 12.3 G/DL (ref 11.5–15.4)
IMM GRANULOCYTES # BLD AUTO: 0.02 THOUSAND/UL (ref 0–0.2)
IMM GRANULOCYTES NFR BLD AUTO: 0 % (ref 0–2)
L PNEUMO1 AG UR QL IA.RAPID: NEGATIVE
LYMPHOCYTES # BLD AUTO: 0.4 THOUSANDS/ÂΜL (ref 0.6–4.47)
LYMPHOCYTES NFR BLD AUTO: 5 % (ref 14–44)
MAGNESIUM SERPL-MCNC: 2.9 MG/DL (ref 1.9–2.7)
MCH RBC QN AUTO: 32 PG (ref 26.8–34.3)
MCHC RBC AUTO-ENTMCNC: 33.6 G/DL (ref 31.4–37.4)
MCV RBC AUTO: 95 FL (ref 82–98)
MONOCYTES # BLD AUTO: 0.15 THOUSAND/ÂΜL (ref 0.17–1.22)
MONOCYTES NFR BLD AUTO: 2 % (ref 4–12)
NEUTROPHILS # BLD AUTO: 7.17 THOUSANDS/ÂΜL (ref 1.85–7.62)
NEUTS SEG NFR BLD AUTO: 93 % (ref 43–75)
NRBC BLD AUTO-RTO: 0 /100 WBCS
P AXIS: 60 DEGREES
PLATELET # BLD AUTO: 219 THOUSANDS/UL (ref 149–390)
PMV BLD AUTO: 10.7 FL (ref 8.9–12.7)
POTASSIUM SERPL-SCNC: 3.7 MMOL/L (ref 3.5–5.3)
PR INTERVAL: 134 MS
PROCALCITONIN SERPL-MCNC: 0.13 NG/ML
QRS AXIS: 59 DEGREES
QRSD INTERVAL: 76 MS
QT INTERVAL: 326 MS
QTC INTERVAL: 418 MS
RBC # BLD AUTO: 3.84 MILLION/UL (ref 3.81–5.12)
S PNEUM AG UR QL: NEGATIVE
S PYO DNA THROAT QL NAA+PROBE: NOT DETECTED
SODIUM SERPL-SCNC: 137 MMOL/L (ref 135–147)
T WAVE AXIS: 52 DEGREES
VENTRICULAR RATE: 99 BPM
WBC # BLD AUTO: 7.75 THOUSAND/UL (ref 4.31–10.16)

## 2025-04-12 PROCEDURE — 94664 DEMO&/EVAL PT USE INHALER: CPT

## 2025-04-12 PROCEDURE — 99233 SBSQ HOSP IP/OBS HIGH 50: CPT | Performed by: INTERNAL MEDICINE

## 2025-04-12 PROCEDURE — 80048 BASIC METABOLIC PNL TOTAL CA: CPT | Performed by: NURSE PRACTITIONER

## 2025-04-12 PROCEDURE — 94760 N-INVAS EAR/PLS OXIMETRY 1: CPT

## 2025-04-12 PROCEDURE — 93010 ELECTROCARDIOGRAM REPORT: CPT | Performed by: INTERNAL MEDICINE

## 2025-04-12 PROCEDURE — 94640 AIRWAY INHALATION TREATMENT: CPT

## 2025-04-12 PROCEDURE — 82948 REAGENT STRIP/BLOOD GLUCOSE: CPT

## 2025-04-12 PROCEDURE — 85025 COMPLETE CBC W/AUTO DIFF WBC: CPT | Performed by: NURSE PRACTITIONER

## 2025-04-12 PROCEDURE — 87205 SMEAR GRAM STAIN: CPT | Performed by: INTERNAL MEDICINE

## 2025-04-12 PROCEDURE — 87449 NOS EACH ORGANISM AG IA: CPT | Performed by: INTERNAL MEDICINE

## 2025-04-12 RX ORDER — METHOCARBAMOL 500 MG/1
1000 TABLET, FILM COATED ORAL EVERY 6 HOURS PRN
Status: DISCONTINUED | OUTPATIENT
Start: 2025-04-12 | End: 2025-04-14 | Stop reason: HOSPADM

## 2025-04-12 RX ORDER — BUDESONIDE 0.5 MG/2ML
0.5 INHALANT ORAL
Status: DISCONTINUED | OUTPATIENT
Start: 2025-04-12 | End: 2025-04-14 | Stop reason: HOSPADM

## 2025-04-12 RX ORDER — MAGNESIUM HYDROXIDE/ALUMINUM HYDROXICE/SIMETHICONE 120; 1200; 1200 MG/30ML; MG/30ML; MG/30ML
30 SUSPENSION ORAL ONCE
Status: COMPLETED | OUTPATIENT
Start: 2025-04-12 | End: 2025-04-12

## 2025-04-12 RX ORDER — SODIUM CHLORIDE 9 MG/ML
100 INJECTION, SOLUTION INTRAVENOUS CONTINUOUS
Status: DISPENSED | OUTPATIENT
Start: 2025-04-12 | End: 2025-04-12

## 2025-04-12 RX ORDER — IBUPROFEN 400 MG/1
800 TABLET, FILM COATED ORAL EVERY 6 HOURS PRN
Status: DISCONTINUED | OUTPATIENT
Start: 2025-04-12 | End: 2025-04-12

## 2025-04-12 RX ORDER — METHYLPREDNISOLONE SODIUM SUCCINATE 40 MG/ML
40 INJECTION, POWDER, LYOPHILIZED, FOR SOLUTION INTRAMUSCULAR; INTRAVENOUS EVERY 8 HOURS SCHEDULED
Status: DISCONTINUED | OUTPATIENT
Start: 2025-04-12 | End: 2025-04-12

## 2025-04-12 RX ORDER — GUAIFENESIN 600 MG/1
600 TABLET, EXTENDED RELEASE ORAL EVERY 12 HOURS SCHEDULED
Status: DISCONTINUED | OUTPATIENT
Start: 2025-04-12 | End: 2025-04-14 | Stop reason: HOSPADM

## 2025-04-12 RX ORDER — ENOXAPARIN SODIUM 100 MG/ML
40 INJECTION SUBCUTANEOUS DAILY
Status: DISCONTINUED | OUTPATIENT
Start: 2025-04-12 | End: 2025-04-14 | Stop reason: HOSPADM

## 2025-04-12 RX ORDER — HYDROCODONE POLISTIREX AND CHLORPHENIRAMINE POLISTIREX 10; 8 MG/5ML; MG/5ML
5 SUSPENSION, EXTENDED RELEASE ORAL ONCE
Refills: 0 | Status: COMPLETED | OUTPATIENT
Start: 2025-04-12 | End: 2025-04-12

## 2025-04-12 RX ORDER — LEVOTHYROXINE SODIUM 88 UG/1
88 TABLET ORAL
Status: DISCONTINUED | OUTPATIENT
Start: 2025-04-12 | End: 2025-04-14 | Stop reason: HOSPADM

## 2025-04-12 RX ORDER — GUAIFENESIN/DEXTROMETHORPHAN 100-10MG/5
10 SYRUP ORAL EVERY 4 HOURS PRN
Status: DISCONTINUED | OUTPATIENT
Start: 2025-04-12 | End: 2025-04-14 | Stop reason: HOSPADM

## 2025-04-12 RX ORDER — HYDROXYZINE HYDROCHLORIDE 25 MG/1
25 TABLET, FILM COATED ORAL EVERY 6 HOURS PRN
Status: DISCONTINUED | OUTPATIENT
Start: 2025-04-12 | End: 2025-04-14 | Stop reason: HOSPADM

## 2025-04-12 RX ORDER — IBUPROFEN 400 MG/1
800 TABLET, FILM COATED ORAL EVERY 6 HOURS PRN
Status: DISCONTINUED | OUTPATIENT
Start: 2025-04-12 | End: 2025-04-14

## 2025-04-12 RX ORDER — PANTOPRAZOLE SODIUM 40 MG/1
40 TABLET, DELAYED RELEASE ORAL
Status: DISCONTINUED | OUTPATIENT
Start: 2025-04-12 | End: 2025-04-14 | Stop reason: HOSPADM

## 2025-04-12 RX ORDER — ALBUTEROL SULFATE 0.83 MG/ML
2.5 SOLUTION RESPIRATORY (INHALATION) EVERY 4 HOURS PRN
Status: DISCONTINUED | OUTPATIENT
Start: 2025-04-12 | End: 2025-04-14 | Stop reason: HOSPADM

## 2025-04-12 RX ORDER — DOCUSATE SODIUM 100 MG/1
100 CAPSULE, LIQUID FILLED ORAL 2 TIMES DAILY
Status: DISCONTINUED | OUTPATIENT
Start: 2025-04-12 | End: 2025-04-14 | Stop reason: HOSPADM

## 2025-04-12 RX ORDER — METHYLPREDNISOLONE SODIUM SUCCINATE 40 MG/ML
40 INJECTION, POWDER, LYOPHILIZED, FOR SOLUTION INTRAMUSCULAR; INTRAVENOUS EVERY 6 HOURS SCHEDULED
Status: DISCONTINUED | OUTPATIENT
Start: 2025-04-12 | End: 2025-04-12

## 2025-04-12 RX ORDER — AZITHROMYCIN 250 MG/1
500 TABLET, FILM COATED ORAL EVERY 24 HOURS
Status: DISCONTINUED | OUTPATIENT
Start: 2025-04-12 | End: 2025-04-14 | Stop reason: HOSPADM

## 2025-04-12 RX ORDER — BENZONATATE 100 MG/1
100 CAPSULE ORAL 3 TIMES DAILY
Status: DISCONTINUED | OUTPATIENT
Start: 2025-04-12 | End: 2025-04-14 | Stop reason: HOSPADM

## 2025-04-12 RX ORDER — MAGNESIUM HYDROXIDE/ALUMINUM HYDROXICE/SIMETHICONE 120; 1200; 1200 MG/30ML; MG/30ML; MG/30ML
30 SUSPENSION ORAL EVERY 6 HOURS PRN
Status: DISCONTINUED | OUTPATIENT
Start: 2025-04-12 | End: 2025-04-14 | Stop reason: HOSPADM

## 2025-04-12 RX ORDER — METHYLPREDNISOLONE SODIUM SUCCINATE 40 MG/ML
40 INJECTION, POWDER, LYOPHILIZED, FOR SOLUTION INTRAMUSCULAR; INTRAVENOUS EVERY 12 HOURS SCHEDULED
Status: DISCONTINUED | OUTPATIENT
Start: 2025-04-12 | End: 2025-04-13

## 2025-04-12 RX ORDER — LEVALBUTEROL INHALATION SOLUTION 1.25 MG/3ML
1.25 SOLUTION RESPIRATORY (INHALATION)
Status: DISCONTINUED | OUTPATIENT
Start: 2025-04-12 | End: 2025-04-12

## 2025-04-12 RX ORDER — OSELTAMIVIR PHOSPHATE 75 MG/1
75 CAPSULE ORAL EVERY 12 HOURS SCHEDULED
Status: DISCONTINUED | OUTPATIENT
Start: 2025-04-12 | End: 2025-04-14 | Stop reason: HOSPADM

## 2025-04-12 RX ORDER — SODIUM CHLORIDE FOR INHALATION 0.9 %
3 VIAL, NEBULIZER (ML) INHALATION
Status: DISCONTINUED | OUTPATIENT
Start: 2025-04-12 | End: 2025-04-12

## 2025-04-12 RX ORDER — LORATADINE 10 MG/1
10 TABLET ORAL DAILY
Status: DISCONTINUED | OUTPATIENT
Start: 2025-04-12 | End: 2025-04-14 | Stop reason: HOSPADM

## 2025-04-12 RX ORDER — LEVALBUTEROL INHALATION SOLUTION 1.25 MG/3ML
1.25 SOLUTION RESPIRATORY (INHALATION)
Status: DISCONTINUED | OUTPATIENT
Start: 2025-04-12 | End: 2025-04-14 | Stop reason: HOSPADM

## 2025-04-12 RX ORDER — ONDANSETRON 2 MG/ML
4 INJECTION INTRAMUSCULAR; INTRAVENOUS EVERY 6 HOURS PRN
Status: DISCONTINUED | OUTPATIENT
Start: 2025-04-12 | End: 2025-04-14 | Stop reason: HOSPADM

## 2025-04-12 RX ORDER — ACETAMINOPHEN 325 MG/1
975 TABLET ORAL EVERY 6 HOURS PRN
Status: DISCONTINUED | OUTPATIENT
Start: 2025-04-12 | End: 2025-04-14

## 2025-04-12 RX ORDER — AMOXICILLIN 250 MG
1 CAPSULE ORAL 2 TIMES DAILY
Status: DISCONTINUED | OUTPATIENT
Start: 2025-04-12 | End: 2025-04-14 | Stop reason: HOSPADM

## 2025-04-12 RX ADMIN — ALUMINUM HYDROXIDE, MAGNESIUM HYDROXIDE, AND SIMETHICONE 30 ML: 200; 200; 20 SUSPENSION ORAL at 12:05

## 2025-04-12 RX ADMIN — DOCUSATE SODIUM 100 MG: 100 CAPSULE, LIQUID FILLED ORAL at 09:10

## 2025-04-12 RX ADMIN — BENZONATATE 100 MG: 100 CAPSULE ORAL at 20:11

## 2025-04-12 RX ADMIN — GUAIFENESIN AND DEXTROMETHORPHAN 10 ML: 20; 200 SYRUP ORAL at 19:19

## 2025-04-12 RX ADMIN — LEVOTHYROXINE SODIUM 88 MCG: 88 TABLET ORAL at 05:15

## 2025-04-12 RX ADMIN — METHYLPREDNISOLONE SODIUM SUCCINATE 40 MG: 40 INJECTION, POWDER, FOR SOLUTION INTRAMUSCULAR; INTRAVENOUS at 20:11

## 2025-04-12 RX ADMIN — ENOXAPARIN SODIUM 40 MG: 40 INJECTION SUBCUTANEOUS at 09:10

## 2025-04-12 RX ADMIN — LEVALBUTEROL HYDROCHLORIDE 1.25 MG: 1.25 SOLUTION RESPIRATORY (INHALATION) at 07:15

## 2025-04-12 RX ADMIN — LEVALBUTEROL HYDROCHLORIDE 1.25 MG: 1.25 SOLUTION RESPIRATORY (INHALATION) at 19:48

## 2025-04-12 RX ADMIN — IBUPROFEN 800 MG: 400 TABLET, FILM COATED ORAL at 00:33

## 2025-04-12 RX ADMIN — SENNOSIDES AND DOCUSATE SODIUM 1 TABLET: 50; 8.6 TABLET ORAL at 09:10

## 2025-04-12 RX ADMIN — DEXTROSE 1000 MG: 50 INJECTION, SOLUTION INTRAVENOUS at 03:02

## 2025-04-12 RX ADMIN — AZITHROMYCIN DIHYDRATE 500 MG: 250 TABLET, FILM COATED ORAL at 12:05

## 2025-04-12 RX ADMIN — IPRATROPIUM BROMIDE 0.5 MG: 0.5 SOLUTION RESPIRATORY (INHALATION) at 19:48

## 2025-04-12 RX ADMIN — GUAIFENESIN AND DEXTROMETHORPHAN 10 ML: 20; 200 SYRUP ORAL at 00:34

## 2025-04-12 RX ADMIN — BENZONATATE 100 MG: 100 CAPSULE ORAL at 17:52

## 2025-04-12 RX ADMIN — HYDROCODONE POLISTIREX AND CHLORPHENIRAMINE POLISTIREX 5 ML: 10; 8 SUSPENSION, EXTENDED RELEASE ORAL at 12:11

## 2025-04-12 RX ADMIN — METHYLPREDNISOLONE SODIUM SUCCINATE 40 MG: 40 INJECTION, POWDER, FOR SOLUTION INTRAMUSCULAR; INTRAVENOUS at 00:35

## 2025-04-12 RX ADMIN — GUAIFENESIN 600 MG: 600 TABLET ORAL at 20:11

## 2025-04-12 RX ADMIN — IPRATROPIUM BROMIDE 0.5 MG: 0.5 SOLUTION RESPIRATORY (INHALATION) at 07:15

## 2025-04-12 RX ADMIN — BENZONATATE 100 MG: 100 CAPSULE ORAL at 12:05

## 2025-04-12 RX ADMIN — METHOCARBAMOL 1000 MG: 500 TABLET ORAL at 23:42

## 2025-04-12 RX ADMIN — METHOCARBAMOL 1000 MG: 500 TABLET ORAL at 16:02

## 2025-04-12 RX ADMIN — IBUPROFEN 800 MG: 400 TABLET, FILM COATED ORAL at 19:19

## 2025-04-12 RX ADMIN — METHYLPREDNISOLONE SODIUM SUCCINATE 40 MG: 40 INJECTION, POWDER, FOR SOLUTION INTRAMUSCULAR; INTRAVENOUS at 05:15

## 2025-04-12 RX ADMIN — BUDESONIDE INHALATION 0.5 MG: 0.5 SUSPENSION RESPIRATORY (INHALATION) at 07:15

## 2025-04-12 RX ADMIN — PANTOPRAZOLE SODIUM 40 MG: 40 TABLET, DELAYED RELEASE ORAL at 12:05

## 2025-04-12 RX ADMIN — ACETAMINOPHEN 975 MG: 325 TABLET, FILM COATED ORAL at 23:42

## 2025-04-12 RX ADMIN — LEVALBUTEROL HYDROCHLORIDE 1.25 MG: 1.25 SOLUTION RESPIRATORY (INHALATION) at 13:35

## 2025-04-12 RX ADMIN — DOCUSATE SODIUM 100 MG: 100 CAPSULE, LIQUID FILLED ORAL at 17:52

## 2025-04-12 RX ADMIN — SENNOSIDES AND DOCUSATE SODIUM 1 TABLET: 50; 8.6 TABLET ORAL at 17:52

## 2025-04-12 RX ADMIN — IPRATROPIUM BROMIDE 0.5 MG: 0.5 SOLUTION RESPIRATORY (INHALATION) at 13:35

## 2025-04-12 RX ADMIN — BUDESONIDE INHALATION 0.5 MG: 0.5 SUSPENSION RESPIRATORY (INHALATION) at 19:48

## 2025-04-12 RX ADMIN — LORATADINE 10 MG: 10 TABLET ORAL at 12:05

## 2025-04-12 RX ADMIN — GUAIFENESIN 600 MG: 600 TABLET ORAL at 09:10

## 2025-04-12 RX ADMIN — OSELTAMIVIR PHOSPHATE 75 MG: 75 CAPSULE ORAL at 09:10

## 2025-04-12 RX ADMIN — ALUMINUM HYDROXIDE, MAGNESIUM HYDROXIDE, AND SIMETHICONE 30 ML: 200; 200; 20 SUSPENSION ORAL at 12:11

## 2025-04-12 RX ADMIN — OSELTAMIVIR PHOSPHATE 75 MG: 75 CAPSULE ORAL at 20:11

## 2025-04-12 RX ADMIN — SODIUM CHLORIDE 100 ML/HR: 0.9 INJECTION, SOLUTION INTRAVENOUS at 00:46

## 2025-04-12 NOTE — ASSESSMENT & PLAN NOTE
Chest tightness and wheezing for 3 days.  Using nebulizer 4 times daily and albuterol inhaler.  Reports little improvement   CXR negative for acute cardiopulmonary disease  Influenza A positive  Will treat with Xopenex and ipratropium 4 times daily, Pulmicort nebulized twice daily  IV Solu-Medrol 40 mg q8h  Supportive care with IV fluids and antitussives  ill-appearing, Will obtain PCT.  Give 1 dose of IV ceftriaxone

## 2025-04-12 NOTE — PLAN OF CARE
Problem: Potential for Falls  Goal: Patient will remain free of falls  Description: INTERVENTIONS:- Educate patient/family on patient safety including physical limitations- Instruct patient to call for assistance with activity - Consult OT/PT to assist with strengthening/mobility - Keep Call bell within reach- Keep bed low and locked with side rails adjusted as appropriate- Keep care items and personal belongings within reach- Initiate and maintain comfort rounds- Make Fall Risk Sign visible to staff- Apply yellow socks and bracelet for high fall risk patients- Consider moving patient to room near nurses station  Outcome: Progressing     Problem: DISCHARGE PLANNING  Goal: Discharge to home or other facility with appropriate resources  Description: INTERVENTIONS:- Identify barriers to discharge w/patient and caregiver- Arrange for needed discharge resources and transportation as appropriate- Identify discharge learning needs (meds, wound care, etc.)- Arrange for interpretive services to assist at discharge as needed- Refer to Case Management Department for coordinating discharge planning if the patient needs post-hospital services based on physician/advanced practitioner order or complex needs related to functional status, cognitive ability, or social support system  Outcome: Progressing     Problem: Knowledge Deficit  Goal: Patient/family/caregiver demonstrates understanding of disease process, treatment plan, medications, and discharge instructions  Description: Complete learning assessment and assess knowledge base.Interventions:- Provide teaching at level of understanding- Provide teaching via preferred learning methods  Outcome: Progressing     Problem: RESPIRATORY - ADULT  Goal: Achieves optimal ventilation and oxygenation  Description: INTERVENTIONS:- Assess for changes in respiratory status- Assess for changes in mentation and behavior- Position to facilitate oxygenation and minimize respiratory effort-  Oxygen administered by appropriate delivery if ordered- Initiate smoking cessation education as indicated- Encourage broncho-pulmonary hygiene including cough, deep breathe, Incentive Spirometry- Assess the need for suctioning and aspirate as needed- Assess and instruct to report SOB or any respiratory difficulty- Respiratory Therapy support as indicated  Outcome: Progressing

## 2025-04-12 NOTE — PROGRESS NOTES
Progress Note - Hospitalist   Name: Ruth Ann Durán 49 y.o. female I MRN: [id number]  Unit/Bed#: E5 -01 I Date of Admission: 4/11/2025   Date of Service: 4/12/2025 I Hospital Day: 0    Assessment & Plan  Moderate persistent asthma with acute exacerbation  Chest tightness and wheezing for 3 days.  Using nebulizer 4 times daily and albuterol inhaler.  Reports little improvement   Influenza A positive  Will treat with Xopenex and ipratropium 4 times daily, Pulmicort nebulized twice daily  IV Solu-Medrol 40 mg q 12 hours  Supportive care with IV fluids and antitussives  Continue ceftriaxone day #1 of 5  Continue azithromycin day #1 of 5  Continue Tamiflu day #1 of 5  Consider possible concomitant gram negative pneumonia, no history of MRSA but would broaden out should patient have no improvement  Influenza A  Continue Tamiflu  Acquired hypothyroidism  Levothyroxine 88 mcg  Hypokalemia  Recent Labs     04/11/25  2137 04/12/25  0520   K 3.2* 3.7       Chronic bilateral low back pain without sciatica  Follows with pain medicine at [facility].  Takes ibuprofen 800 mg as needed and muscle relaxant  Class 1 obesity  Weight loss, exercise, dietary and lifestyle modifications          Hospital Course:     49-year-old female patient presenting with asthma exacerbation in the setting of possible gram-negative pneumonia.     Assessment:      Principal Problem:    Moderate persistent asthma with acute exacerbation  Active Problems:    Acquired hypothyroidism    Chronic bilateral low back pain without sciatica    Class 1 obesity    Influenza A    Hypokalemia      Plan:    Continue ceftriaxone  Start Tamiflu  Continue antitussives       VTE Pharmacologic Prophylaxis:   Pharmacologic: Enoxaparin (Lovenox)  Mechanical VTE Prophylaxis in Place: Yes    AM-PAC Basic Mobility:  Basic Mobility Inpatient Raw Score: 24    JH-HLM Achieved: 6: Walk 10 steps or more  JH-HLM Goal: 8: Walk 250 feet or more    HLM Goal listed above. Continue  with multidisciplinary rounding and encourage appropriate mobility to improve upon HLM goals.         Patient Centered Rounds: Case discussed and reviewed with nursing    Discussions with Specialists or Other Care Team Provider: Case management    Education and Discussions with Family / Patient: Patient updating family    Time Spent for Care: 80 minutes.  More than 50% of total time spent on counseling and coordination of care as described above.    Current Length of Stay: 0 day(s)    Current Patient Status: Observation   Certification Statement: The patient will continue to require additional inpatient hospital stay due to asthma exacerbation with possible gram-negative pneumonia    Discharge Plan / Estimated Discharge Date: Anticipate discharge in the next 24 to 48 hours    Code Status: Level 1 - Full Code      Subjective:   Seen and examined, no acute complaints.  No nausea no vomiting, no abdominal pain    A complete and comprehensive 14 point organ system review has been performed and all other systems are negative other than stated above.    Objective:     Vitals:   Temp (24hrs), Av.2 °F (37.3 °C), Min:97.7 °F (36.5 °C), Max:100.1 °F (37.8 °C)    Temp:  [97.7 °F (36.5 °C)-100.1 °F (37.8 °C)] 97.7 °F (36.5 °C)  HR:  [] 75  Resp:  [16-26] 16  BP: (106-143)/(57-92) 128/65  SpO2:  [94 %-99 %] 96 %  Body mass index is 32.12 kg/m².     Input and Output Summary (last 24 hours):       Intake/Output Summary (Last 24 hours) at 2025 1246  Last data filed at 2025 1141  Gross per 24 hour   Intake 1270 ml   Output --   Net 1270 ml       Physical Exam:     General: well appearing, no acute distress  HEENT: atraumatic, PERRLA, moist mucosa, normal pharynx, normal tonsils and adenoids, normal tongue, no fluid in sinuses  Neck: Trachea midline, no carotid bruit, no masses  Respiratory: normal chest wall expansion, diffuse expiratory wheezing and cough, no r/r/w, no rubs  Cardiovascular: RRR, no m/r/g, Normal  S1 and S2  Abdomen: Soft, non-tender, non-distended, normal bowel sounds in all quadrants, no hepatosplenomegaly, no tympany  Rectal: deferred  Musculoskeletal: normal ROM in upper and lower extremities  Integumentary: warm, dry, and pink, with no rash, purpura, or petechia  Heme/Lymph: no lymphadenopathy, no bruises  Neurological: Cranial Nerves II-XII grossly intact  Psychiatric: cooperative with normal mood, affect, and cognition      Additional Data:     Labs:    Results from last 7 days   Lab Units 04/12/25  0520   WBC Thousand/uL 7.75   HEMOGLOBIN g/dL 12.3   HEMATOCRIT % 36.6   PLATELETS Thousands/uL 219   SEGS PCT % 93*   LYMPHO PCT % 5*   MONO PCT % 2*   EOS PCT % 0     Results from last 7 days   Lab Units 04/12/25  0520 04/11/25  2137   POTASSIUM mmol/L 3.7 3.2*   CHLORIDE mmol/L 105 100   CO2 mmol/L 22 23   BUN mg/dL 9 9   CREATININE mg/dL 0.96 0.88   CALCIUM mg/dL 8.6 9.0   ALK PHOS U/L  --  93   ALT U/L  --  34   AST U/L  --  32           * I Have Reviewed All Lab Data Listed Above.  * Additional Pertinent Lab Tests Reviewed: All Labs For Current Hospital Admission Reviewed      Lines/Drains:  Invasive Devices       Peripheral Intravenous Line  Duration             Peripheral IV 04/11/25 Left Antecubital <1 day                          Imaging:  Imaging Reports Reviewed Today Include:   No results found.    Telemetry:       Recent Cultures (last 7 days):         Last 24 Hours Medication List:   Current Facility-Administered Medications   Medication Dose Route Frequency Provider Last Rate    acetaminophen  975 mg Oral Q6H PRN MARIA ALEJANDRA Holman      albuterol  2.5 mg Nebulization Q4H PRN MARIA ALEJANDRA Holman      aluminum-magnesium hydroxide-simethicone  30 mL Oral Q6H PRN MARIA ALEJANDRA Holman      azithromycin  500 mg Oral Q24H Ian Roland, DO      benzonatate  100 mg Oral TID Ian Roland,       budesonide  0.5 mg Nebulization Q12H MARIA ALEJANDRA Holman      [START ON  4/13/2025] cefTRIAXone  1,000 mg Intravenous Q24H Ian Roland, DO      dextromethorphan-guaiFENesin  10 mL Oral Q4H PRN Doris Bettencourt, MARIA ALEJANDRA      docusate sodium  100 mg Oral BID Doris Bettencourt, MARIA ALEJANDRA      enoxaparin  40 mg Subcutaneous Daily Doris Bettencourt, MARIA ALEJANDRA      guaiFENesin  600 mg Oral Q12H [facility] Doris Bettencourt, MARIA ALEJANDRA      hydrOXYzine HCL  25 mg Oral Q6H PRN Doris Bettencourt, SIDNEYNP      ibuprofen  800 mg Oral Q6H PRN Doris Bettencourt, MARIA ALEJANDRA      ipratropium  0.5 mg Nebulization TID Jose Luis Allen, DO      levalbuterol  1.25 mg Nebulization TID Jose Luis Allen, DO      levothyroxine  88 mcg Oral Early Morning Doris Bettencourt, MARIA ALEJANDRA      loratadine  10 mg Oral Daily Ian Roland, DO      methocarbamol  1,000 mg Oral Q6H PRN Doris Bettencourt, MARIA ALEJANDRA      methylPREDNISolone sodium succinate  40 mg Intravenous Q12H [facility] Ian Roland, DO      ondansetron  4 mg Intravenous Q6H PRN Doris Bettencourt, MARIA ALEJANDRA      oseltamivir  75 mg Oral Q12H [facility] Ian Roland, DO      pantoprazole  40 mg Oral Early Morning Ian Roland, DO      senna-docusate sodium  1 tablet Oral BID MARIA ALEJANDRA Holman         AM-PAC Basic Mobility:  Basic Mobility Inpatient Raw Score: 24    JH-HLM Achieved: 6: Walk 10 steps or more  JH-HLM Goal: 8: Walk 250 feet or more    HLM Goal listed above. Continue with multidisciplinary rounding and encourage appropriate mobility to improve upon HLM goals.     Today, Patient Was Seen By: Ian Roland DO    ** Please Note: This note was completed in part utilizing [employer] software dictation.  Grammatical errors, random word insertions, spelling mistakes, and incomplete sentences may be an occasional consequence of this system secondary to software limitations, ambient noise, and hardware issues.  If you have any questions or concerns about the content, text, or information contained within the body of this dictation, please contact the  provider for clarification. **

## 2025-04-12 NOTE — ASSESSMENT & PLAN NOTE
Chest tightness and wheezing for 3 days.  Using nebulizer 4 times daily and albuterol inhaler.  Reports little improvement   Influenza A positive  Will treat with Xopenex and ipratropium 4 times daily, Pulmicort nebulized twice daily  IV Solu-Medrol 40 mg q 12 hours  Supportive care with IV fluids and antitussives  Continue ceftriaxone day #1 of 5  Continue azithromycin day #1 of 5  Continue Tamiflu day #1 of 5  Consider possible concomitant gram negative pneumonia, no history of MRSA but would broaden out should patient have no improvement

## 2025-04-12 NOTE — ASSESSMENT & PLAN NOTE
Follows with pain medicine at [facility].  Takes ibuprofen 800 mg as needed and muscle relaxant

## 2025-04-12 NOTE — ED PROVIDER NOTES
Time reflects when diagnosis was documented in both MDM as applicable and the Disposition within this note       Time User Action Codes Description Comment    4/11/2025 11:13 PM Alex Bravo Add [J45.901] Asthma exacerbation     4/11/2025 11:26 PM Alex Bravo [J11.1] Flu           ED Disposition       ED Disposition   Admit    Condition   Stable    Date/Time   Fri Apr 11, 2025 11:26 PM    Comment   Case was discussed with [facility] and the patient's admission status was agreed to be Admission Status: observation status to the service of Dr. Allen .               Assessment & Plan       Medical Decision Making  Patient seen and evaluated, patient is in acute distress.  Saying that she cannot breathe.  No stridor.  Lungs are diminished bilaterally.  Differential includes but is not limited to asthma exacerbation, pneumonia, viral syndrome, bronchitis, pneumothorax, ACS.  Patient mentions that she takes 800 mg of aspirin, did not take today.  Given tachypnea, near fever, consider chronic salicylate toxicity.  Treating with heart neb, magnesium, Solu-Medrol, epinephrine.  Do not see hives or urticaria that would be consistent with anaphylaxis.  She also denied any allergies.    Amount and/or Complexity of Data Reviewed  Labs: ordered. Decision-making details documented in ED Course.  Radiology: ordered and independent interpretation performed. Decision-making details documented in ED Course.  ECG/medicine tests:  Decision-making details documented in ED Course.    Risk  Prescription drug management.  Decision regarding hospitalization.        ED Course as of 04/11/25 2327 Fri Apr 11, 2025 2138 ECG 12 lead  I independently interpreted EKG.  Sinus rhythm, heart rate 99 bpm.  Normal axis.  Intervals normal.  No STEMI.   2140 Patient has received vaccinations   2149 CBC and differential(!)  No anemia or leukocytosis   2149 Under assessment, patient has better aeration   2157 Basic metabolic panel(!)  Mild  hypokalemia   2158 Per my independent interpretation of the cardiac monitor, patient is sinus tachycardia, no ectopy   2233 Patient feeling improved.  Able to engage in more conversation.  Reports that she has been having nausea, vomiting, diarrhea as well.   2250 X-ray chest 1 view portable  I independently reviewed x-ray, no acute cardiopulmonary process.  Formal radiology report pending   2310 hs TnI 0hr: 3  Not consistent with acute ischemia   2310 Hepatic function panel  No transaminitis   2310 Under assessment, patient continues to feel improved and looks better.  Will discuss for admission.   2314 Critical Care Time Statement: Upon my evaluation, this patient had a high probability of imminent or life-threatening deterioration due to asthma exacerbation, which required my direct attention, intervention, and personal management.  I spent a total of 35 minutes directly providing critical care services, including interpretation of complex medical databases, evaluating for the presence of life-threatening injuries or illnesses, management of organ system failure(s) , complex medical decision making (to support/prevent further life-threatening deterioration)., and interpretation of hemodynamic data. This time is exclusive of procedures, teaching, treating other patients, family meetings, and any prior time recorded by providers other than myself.       2315 Influenza A Rapid Antigen(!): Positive       Medications   potassium chloride 20 mEq IVPB (premix) (20 mEq Intravenous New Bag 4/11/25 2209)   albuterol inhalation solution 5 mg (5 mg Nebulization Given 4/11/25 2130)     And   ipratropium (ATROVENT) 0.02 % inhalation solution 0.5 mg (0.5 mg Nebulization Given 4/11/25 2131)   albuterol inhalation solution 10 mg (10 mg Nebulization Given 4/11/25 2155)   ipratropium (ATROVENT) 0.02 % inhalation solution 1 mg (1 mg Nebulization Given 4/11/25 2155)   sodium chloride 0.9 % inhalation solution 12 mL (12 mL  Nebulization Given 4/11/25 2155)   EPINEPHrine PF (ADRENALIN) 1 mg/mL injection 0.3 mg (0.3 mg Intramuscular Given 4/11/25 2146)   magnesium sulfate 2 g/50 mL IVPB (premix) 2 g (0 g Intravenous Stopped 4/11/25 2200)   sodium chloride 0.9 % bolus 1,000 mL (1,000 mL Intravenous New Bag 4/11/25 2138)   methylPREDNISolone sodium succinate (Solu-MEDROL) injection 125 mg (125 mg Intravenous Given 4/11/25 2141)   ondansetron (ZOFRAN) injection 4 mg (4 mg Intravenous Given 4/11/25 2213)       ED Risk Strat Scores                    No data recorded        SBIRT 22yo+      Flowsheet Row Most Recent Value   Initial Alcohol Screen: US AUDIT-C     1. How often do you have a drink containing alcohol? 0 Filed at: 04/11/2025 2134   2. How many drinks containing alcohol do you have on a typical day you are drinking?  0 Filed at: 04/11/2025 2134   3a. Male UNDER 65: How often do you have five or more drinks on one occasion? 0 Filed at: 04/11/2025 2134   3b. FEMALE Any Age, or MALE 65+: How often do you have 4 or more drinks on one occassion? 0 Filed at: 04/11/2025 2134   Audit-C Score 0 Filed at: 04/11/2025 2134   MAHENDRA: How many times in the past year have you...    Used an illegal drug or used a prescription medication for non-medical reasons? Never Filed at: 04/11/2025 2134                            History of Present Illness       Chief Complaint   Patient presents with    Shortness of Breath     Patient dropped off at [facility] bay with SO stating she is unable to breathe. Patient states she has asthma and can't breath. Last albuterol taken at 7pm.       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    Right shoulder pain     Uterine fibroid     Wears glasses       Past Surgical History:   Procedure Laterality Date    ABDOMINAL ADHESION SURGERY N/A 5/19/2017    Procedure: LYSIS ADHESIONS;  Surgeon: C William Riedel, DO;  Location: [facility];  Service:     CYSTOSCOPY N/A 5/19/2017    Procedure: CYSTOSCOPY;  Surgeon: C William Riedel, DO;  Location: [facility];  Service:     CYSTOSCOPY      HYSTERECTOMY  2017    DC COLONOSCOPY FLX DX W/COLLJ SPEC WHEN PFRMD N/A 4/24/2018    Procedure: COLONOSCOPY;  Surgeon: Winsome Christine MD;  Location: [facility];  Service: General    DC LAPS TOTAL HYSTERECT 250 GM/< W/RMVL TUBE/OVARY N/A 5/19/2017    Procedure: HYSTERECTOMY LAPAROSCOPIC TOTAL ,BILATERAL SALPINGECTOMY;  Surgeon: C William Riedel, DO;  Location: [facility];  Service: Gynecology      Family History   Problem Relation Age of Onset    Diabetes Mother     Hypertension Mother     Hyperlipidemia Mother     No Known Problems Father     No Known Problems Brother     No Known Problems Daughter     Throat cancer Maternal Grandmother     Stomach cancer Paternal Grandmother     No Known Problems Daughter     No Known Problems Daughter       Social History     Tobacco Use    Smoking status: Never    Smokeless tobacco: Never   Vaping Use    Vaping status: Never Used   Substance Use Topics    Alcohol use: No     Comment: social    Drug use: No      E-Cigarette/Vaping    E-Cigarette Use Never User       E-Cigarette/Vaping Substances    Nicotine No     THC No     CBD No     Flavoring No     Other No     Unknown No       I have reviewed and agree with the history as documented.     Patient was dropped off at the ambulance bay saying that she could not breathe, had a history of asthma and last use her inhaler around 7 PM.  History is limited secondary to acuity of situation.  Patient saying that she could not breathe, has been worsening  over several days.  Also complaining of low back pain, did not take her aspirin.  Says that she takes 800 mg of aspirin.      History provided by:  Patient and medical records  History limited by:  Acuity of condition  Shortness of Breath  Associated symptoms: chest pain, cough and vomiting        Review of Systems   Unable to perform ROS: Acuity of condition   Respiratory:  Positive for cough and shortness of breath.    Cardiovascular:  Positive for chest pain.   Gastrointestinal:  Positive for diarrhea, nausea and vomiting.   Genitourinary:  Negative for difficulty urinating.           Objective       ED Triage Vitals   Temperature Pulse Blood Pressure Respirations SpO2 Patient Position - Orthostatic VS   04/11/25 2128 04/11/25 2128 04/11/25 2128 04/11/25 2130 04/11/25 2128 04/11/25 2128   100.1 °F (37.8 °C) 104 143/92 (!) 26 97 % Sitting      Temp Source Heart Rate Source BP Location FiO2 (%) Pain Score    04/11/25 2128 04/11/25 2128 04/11/25 2128 -- --    Oral Monitor Right arm        Vitals      Date and Time Temp Pulse SpO2 Resp BP Pain Score FACES Pain Rating User   04/11/25 2315 -- 136 99 % 20 126/65 -- -- [facility]   04/11/25 2230 -- 120 99 % 18 135/67 -- -- DM   04/11/25 2130 -- -- -- 26 -- -- -- [facility]   04/11/25 2128 100.1 °F (37.8 °C) 104 97 % -- 143/92 -- -- [facility]            Physical Exam  Vitals and nursing note reviewed.   Constitutional:       General: She is in acute distress.      Appearance: She is well-developed. She is ill-appearing.   HENT:      Head: Normocephalic and atraumatic.   Eyes:      Conjunctiva/sclera: Conjunctivae normal.   Cardiovascular:      Rate and Rhythm: Normal rate and regular rhythm.      Heart sounds: No murmur heard.  Pulmonary:      Effort: Tachypnea and respiratory distress present.      Breath sounds: Decreased air movement present.   Abdominal:      Palpations: Abdomen is soft.      Tenderness: There is no abdominal tenderness.   Musculoskeletal:         General: No swelling.       Cervical back: Neck supple.   Skin:     General: Skin is warm and dry.      Capillary Refill: Capillary refill takes less than 2 seconds.   Neurological:      General: No focal deficit present.      Mental Status: She is alert.      GCS: GCS eye subscore is 4. GCS verbal subscore is 5. GCS motor subscore is 6.   Psychiatric:         Mood and Affect: Mood normal.         Results Reviewed       Procedure Component Value Units Date/Time    FLU/COVID Rapid Antigen (30 min. TAT) - Preferred screening test in ED [[id number]]  (Abnormal) Collected: 04/11/25 2209    Lab Status: Final result Specimen: Nares from Nose Updated: 04/11/25 9518     SARS COV Rapid Antigen Negative     Influenza A Rapid Antigen Positive     Influenza B Rapid Antigen Negative    Narrative:      This test has been performed using the [employer] Margaret 2 FLU+SARS Antigen test under the Emergency Use Authorization (EUA). This test has been validated by the  and verified by the performing laboratory. The Margaret uses lateral flow immunofluorescent sandwich assay to detect SARS-COV, Influenza A and Influenza B Antigen.     The [employer]idel Margaret 2 SARS Antigen test does not differentiate between SARS-CoV and SARS-CoV-2.     Negative results are presumptive and may be confirmed with a molecular assay, if necessary, for patient management. Negative results do not rule out SARS-CoV-2 or influenza infection and should not be used as the sole basis for treatment or patient management decisions. A negative test result may occur if the level of antigen in a sample is below the limit of detection of this test.     Positive results are indicative of the presence of viral antigens, but do not rule out bacterial infection or co-infection with other viruses.     All test results should be used as an adjunct to clinical observations and other information available to the provider.    FOR PEDIATRIC PATIENTS - copy/paste COVID Guidelines URL to browser:  [email or web address]    Hepatic function panel [[id number]]  (Normal) Collected: 04/11/25 2137    Lab Status: Final result Specimen: Blood from Arm, Left Updated: 04/11/25 2309     Total Bilirubin 0.43 mg/dL      Bilirubin, Direct 0.10 mg/dL      Alkaline Phosphatase 93 U/L      AST 32 U/L      ALT 34 U/L      Total Protein 7.1 g/dL      Albumin 4.1 g/dL     HS Troponin 0hr (reflex protocol) [[id number]]  (Normal) Collected: 04/11/25 2137    Lab Status: Final result Specimen: Blood from Arm, Left Updated: 04/11/25 2309     hs TnI 0hr 3 ng/L     Salicylate level [[id number]]  (Normal) Collected: 04/11/25 2209    Lab Status: Final result Specimen: Blood from Arm, Left Updated: 04/11/25 2240     Salicylate Lvl <5 mg/dL     Narrative:      Verified by repeat analysis.Unable to calculate concentration. Result is lower than the dynamic range.    POCT pregnancy, urine [[id number]]     Lab Status: No result     Basic metabolic panel [[id number]]  (Abnormal) Collected: 04/11/25 2137    Lab Status: Final result Specimen: Blood from Arm, Left Updated: 04/11/25 2155     Sodium 135 mmol/L      Potassium 3.2 mmol/L      Chloride 100 mmol/L      CO2 23 mmol/L      ANION GAP 12 mmol/L      BUN 9 mg/dL      Creatinine 0.88 mg/dL      Glucose 126 mg/dL      Calcium 9.0 mg/dL      eGFR 77 ml/min/1.73sq m     Narrative:      National Kidney Disease Foundation guidelines for Chronic Kidney Disease (CKD):     Stage 1 with normal or high GFR (GFR > 90 mL/min/1.73 square meters)    Stage 2 Mild CKD (GFR = 60-89 mL/min/1.73 square meters)    Stage 3A Moderate CKD (GFR = 45-59 mL/min/1.73 square meters)    Stage 3B Moderate CKD (GFR = 30-44 mL/min/1.73 square meters)    Stage 4 Severe CKD (GFR = 15-29 mL/min/1.73 square meters)    Stage 5 End Stage CKD (GFR <15 mL/min/1.73 square meters)  Note: GFR calculation is accurate only with a steady state creatinine    CBC and differential [[id number]]   (Abnormal) Collected: 04/11/25 2137    Lab Status: Final result Specimen: Blood from Arm, Left Updated: 04/11/25 2142     WBC 7.57 Thousand/uL      RBC 4.30 Million/uL      Hemoglobin 13.6 g/dL      Hematocrit 39.3 %      MCV 91 fL      MCH 31.6 pg      MCHC 34.6 g/dL      RDW 11.9 %      MPV 10.1 fL      Platelets 221 Thousands/uL      nRBC 0 /100 WBCs      Segmented % 78 %      Immature Grans % 0 %      Lymphocytes % 14 %      Monocytes % 8 %      Eosinophils Relative 0 %      Basophils Relative 0 %      Absolute Neutrophils 5.82 Thousands/µL      Absolute Immature Grans 0.02 Thousand/uL      Absolute Lymphocytes 1.07 Thousands/µL      Absolute Monocytes 0.62 Thousand/µL      Eosinophils Absolute 0.02 Thousand/µL      Basophils Absolute 0.02 Thousands/µL             X-ray chest 1 view portable   ED Interpretation by Alex Bravo DO (04/11 2250)   No pneumothorax, focal infiltrate or significant effusion          Procedures    ED Medication and Procedure Management   Prior to Admission Medications   Prescriptions Last Dose Informant Patient Reported? Taking?   ALPRAZolam (XANAX) 0.25 mg tablet   No No   Sig: Take 1 tablet (0.25 mg total) by mouth 2 (two) times a day as needed for anxiety for up to 7 days   Patient not taking: Reported on 1/15/2023   DULoxetine (CYMBALTA) 60 mg delayed release capsule   Yes No   Sig: Take 60 mg by mouth daily   Patient not taking: Reported on 2/2/2025   albuterol (2.5 mg/3 mL) 0.083 % nebulizer solution   No No   Sig: Take 1 vial (2.5 mg total) by nebulization every 4 (four) hours as needed for wheezing or shortness of breath   Patient not taking: Reported on 2/2/2025   albuterol (PROVENTIL HFA,VENTOLIN HFA) 90 mcg/act inhaler   No No   Sig: Inhale 2 puffs every 6 (six) hours as needed for wheezing   Patient not taking: Reported on 2/2/2025   busPIRone (BUSPAR) 5 mg tablet   Yes No   Sig: Take 5 mg by mouth 2 (two) times a day   Patient not taking: Reported on  1/15/2023   cetirizine (ZyrTEC) 10 mg tablet   No No   Sig: Take 1 tablet (10 mg total) by mouth daily   Patient not taking: Reported on 2025   cyclobenzaprine (FLEXERIL) 10 mg tablet   No No   Sig: Take 1 tablet (10 mg total) by mouth 2 (two) times a day as needed for muscle spasms   Patient not taking: Reported on 2025   diazepam (VALIUM) 5 mg tablet   No No   Sig: Take 1 tablet (5 mg total) by mouth 2 (two) times a day for 5 days   diclofenac potassium (CATAFLAM) 50 mg tablet   Yes No   Sig: Take 50 mg by mouth 2 (two) times a day as needed   fluticasone (FLONASE) 50 mcg/act nasal spray   No No   Si sprays into each nostril daily   Patient not taking: Reported on 2025   hydrOXYzine HCL (ATARAX) 25 mg tablet   No No   Sig: Take 1 tablet (25 mg total) by mouth daily at bedtime   Patient not taking: Reported on 2025   levothyroxine 75 mcg tablet   Yes No   Sig: Take 75 mcg by mouth daily   methocarbamol (ROBAXIN) 500 mg tablet   No No   Sig: Take 1 tablet (500 mg total) by mouth 2 (two) times a day   Patient not taking: Reported on 2025   methocarbamol (ROBAXIN) 500 mg tablet   No No   Sig: Take 1 tablet (500 mg total) by mouth 2 (two) times a day   methocarbamol (ROBAXIN) 750 mg tablet   No No   Si/2 to 1 tablet every 6-8 hours or hs prn for muscle pain, spasms.  Home use only.   Patient not taking: Reported on 2025   naproxen (NAPROSYN) 375 mg tablet   No No   Sig: Take 1 tablet (375 mg total) by mouth 2 (two) times a day with meals   naproxen (NAPROSYN) 500 mg tablet   No No   Sig: Take 1 tablet (500 mg total) by mouth 2 (two) times a day with meals   Patient not taking: Reported on 2025   naproxen (NAPROSYN) 500 mg tablet   No No   Sig: Take 1 tablet (500 mg total) by mouth 2 (two) times a day with meals   Patient not taking: Reported on 2025   norethindrone (AYGESTIN) 5 mg tablet   No No   Sig: Take 1 tablet (5 mg total) by mouth daily   ondansetron (ZOFRAN) 4 mg tablet    Yes No   Sig: Take by mouth   Patient not taking: Reported on 2/2/2025   ondansetron (ZOFRAN-ODT) 4 mg disintegrating tablet   No No   Sig: Take 1 tablet (4 mg total) by mouth every 6 (six) hours as needed for nausea or vomiting   Patient not taking: Reported on 2/2/2025   predniSONE 20 mg tablet   No No   Sig: One po bid x 5 days.  Take with food   Patient not taking: Reported on 2/2/2025   predniSONE 20 mg tablet   No No   Sig: Take 1 tablet (20 mg total) by mouth daily   Patient not taking: Reported on 2/2/2025   triamcinolone (KENALOG) 0.1 % cream   Yes No   Sig: Apply 1 application topically 2 (two) times a day   Patient not taking: Reported on 2/2/2025      Facility-Administered Medications: None     Patient's Medications   Discharge Prescriptions    No medications on file     No discharge procedures on file.  ED SEPSIS DOCUMENTATION   Time reflects when diagnosis was documented in both MDM as applicable and the Disposition within this note       Time User Action Codes Description Comment    4/11/2025 11:13 PM Alex Bravo [J45.901] Asthma exacerbation     4/11/2025 11:26 PM Alex Bravo [J11.1] Flu                  Alex Bravo DO  04/11/25 9248

## 2025-04-12 NOTE — H&P
"H&P - Hospitalist   Name: Ruth Ann Durán 49 y.o. female I MRN: [id number]  Unit/Bed#: E5 -01 I Date of Admission: 4/11/2025   Date of Service: 4/12/2025 I Hospital Day: 0     Assessment & Plan  Moderate persistent asthma with acute exacerbation  Chest tightness and wheezing for 3 days.  Using nebulizer 4 times daily and albuterol inhaler.  Reports little improvement   CXR negative for acute cardiopulmonary disease  Influenza A positive  Will treat with Xopenex and ipratropium 4 times daily, Pulmicort nebulized twice daily  IV Solu-Medrol 40 mg q8h  Supportive care with IV fluids and antitussives  ill-appearing, Will obtain PCT.  Give 1 dose of IV ceftriaxone  Influenza A  Symptoms >48 hours.  Defer Tamiflu  Acquired hypothyroidism  Levothyroxine 88 mcg  Anxiety  As needed Atarax 25mg  Hypokalemia  K3.2.  Replete.  Repeat a.m. BMP  Chronic bilateral low back pain without sciatica  Follows with [facility] medicine at [facility].  Takes ibuprofen 800 mg as needed and muscle relaxant  Class 1 obesity  Weight loss, exercise, dietary and lifestyle modifications      VTE Pharmacologic Prophylaxis:   Moderate Risk (Score 3-4) - Pharmacological DVT Prophylaxis Ordered: enoxaparin (Lovenox).  Code Status: Level 1 - Full Code FC  Discussion with family: Updated  () at bedside.    Anticipated Length of Stay: Patient will be admitted on an observation basis with an anticipated length of stay of less than 2 midnights secondary to flu, asthma exacerbation.    History of Present Illness   Chief Complaint: \" Short of breath\"    Ruth Ann Durán is a 49 y.o. female with a PMH of above who presents with a myriad of symptoms for 3 days. Reports fever, shortness of breath, chest tightness, wheezing, sore throat, nausea, emesis, diarrhea, headache and back pain.  Has been using nebulizer 4 times daily and albuterol inhaler with minimal relief. Reports cough productive of \"creamy\" sputum. Has been unable to eat food " for the last 3 days but has been hydrating with water.  Chronic back pain, denies recent falls or trauma.  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    Review of Systems   Constitutional:  Positive for appetite change, chills and fever.        Loss of appetite   HENT:  Positive for sore throat.    Respiratory:  Positive for cough, chest tightness, shortness of breath and wheezing.    Cardiovascular: Negative.    Gastrointestinal:  Positive for diarrhea, nausea and vomiting. Negative for abdominal pain.   Genitourinary: Negative.    Musculoskeletal:  Positive for back pain.   Skin: Negative.    Neurological:  Positive for weakness.       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    Right shoulder pain     Uterine fibroid     Wears glasses      Past Surgical History:   Procedure Laterality Date    ABDOMINAL ADHESION SURGERY N/A 5/19/2017    Procedure: LYSIS ADHESIONS;  Surgeon: C William Riedel, DO;  Location: [facility];  Service:     CYSTOSCOPY N/A 5/19/2017    Procedure: CYSTOSCOPY;  Surgeon: C William Riedel, DO;  Location: [facility];  Service:     CYSTOSCOPY      HYSTERECTOMY  2017    PA COLONOSCOPY FLX DX W/COLLJ SPEC WHEN PFRMD N/A 4/24/2018    Procedure: COLONOSCOPY;  Surgeon: Winsome Christine MD;  Location: [facility];  Service: General    PA LAPS TOTAL HYSTERECT 250 GM/< W/RMVL TUBE/OVARY N/A 5/19/2017    Procedure: HYSTERECTOMY LAPAROSCOPIC TOTAL ,BILATERAL SALPINGECTOMY;  Surgeon: C William Riedel, DO;  Location: [facility];  Service:  Gynecology     Social History     Tobacco Use    Smoking status: Never    Smokeless tobacco: Never   Vaping Use    Vaping status: Never Used   Substance and Sexual Activity    Alcohol use: No     Comment: social    Drug use: No    Sexual activity: Yes     Partners: Male     Birth control/protection: None     E-Cigarette/Vaping    E-Cigarette Use Never User      E-Cigarette/Vaping Substances    Nicotine No     THC No     CBD No     Flavoring No     Other No     Unknown No      Family History   Problem Relation Age of Onset    Diabetes Mother     Hypertension Mother     Hyperlipidemia Mother     No Known Problems Father     No Known Problems Brother     No Known Problems Daughter     Throat cancer Maternal Grandmother     Stomach cancer Paternal Grandmother     No Known Problems Daughter     No Known Problems Daughter      Social History:  Marital Status: /Civil Union   Occupation: warehouse  Patient Pre-hospital Living Situation: Resides at home with   Patient Pre-hospital Level of Mobility: walks  Patient Pre-hospital Diet Restrictions:     Meds/Allergies   I have reviewed home medications with patient personally.  Prior to Admission medications    Medication Sig Start Date End Date Taking? Authorizing Provider   docusate sodium (COLACE) 100 mg capsule Take 100 mg by mouth 2 (two) times a day   Yes Historical Provider, MD   Fluticasone-Salmeterol (Advair) 100-50 mcg/dose inhaler Inhale 1 puff 2 (two) times a day Rinse mouth after use.   Yes Historical Provider, MD   ibuprofen (MOTRIN) 800 mg tablet Take 800 mg by mouth every 6 (six) hours as needed for mild pain or moderate pain   Yes Historical Provider, MD   levothyroxine 88 mcg tablet Take 1 tablet by mouth in the morning 3/6/25  Yes Historical Provider, MD   senna-docusate sodium (SENOKOT-S) 8.6-50 mg per tablet Take 1 tablet by mouth 2 (two) times a day   Yes Historical Provider, MD   albuterol (2.5 mg/3 mL) 0.083 % nebulizer solution Take 1 vial  (2.5 mg total) by nebulization every 4 (four) hours as needed for wheezing or shortness of breath  Patient not taking: Reported on 2/2/2025 2/15/20   Cristino Celis PA-C   albuterol (PROVENTIL HFA,VENTOLIN HFA) 90 mcg/act inhaler Inhale 2 puffs every 6 (six) hours as needed for wheezing  Patient not taking: Reported on 2/2/2025 2/15/20   Cristino Celis PA-C   ALPRAZolam (XANAX) 0.25 mg tablet Take 1 tablet (0.25 mg total) by mouth 2 (two) times a day as needed for anxiety for up to 7 days  Patient not taking: Reported on 1/15/2023 10/22/19 1/15/23  MARIA ALEJANDRA Pizarro   busPIRone (BUSPAR) 5 mg tablet Take 5 mg by mouth 2 (two) times a day  Patient not taking: Reported on 1/15/2023 5/3/21 5/3/22  Jenny Bray MD   cetirizine (ZyrTEC) 10 mg tablet Take 1 tablet (10 mg total) by mouth daily  Patient not taking: Reported on 1/15/2023 2/15/20   Cristino Celis PA-C   cyclobenzaprine (FLEXERIL) 10 mg tablet Take 1 tablet (10 mg total) by mouth 2 (two) times a day as needed for muscle spasms  Patient not taking: Reported on 2/2/2025 5/21/23   Cole Tai MD   diazepam (VALIUM) 5 mg tablet Take 1 tablet (5 mg total) by mouth 2 (two) times a day for 5 days 5/6/20 5/11/20  Inocencia Bassett DO   hydrOXYzine HCL (ATARAX) 25 mg tablet Take 1 tablet (25 mg total) by mouth daily at bedtime  Patient not taking: Reported on 2/2/2025 8/27/19   MARIA ALEJANDRA Pizarro   methocarbamol (ROBAXIN) 500 mg tablet Take 1 tablet (500 mg total) by mouth 2 (two) times a day  Patient not taking: Reported on 2/2/2025 5/6/20   Inocencia Bassett DO   methocarbamol (ROBAXIN) 750 mg tablet 1/2 to 1 tablet every 6-8 hours or hs prn for muscle pain, spasms.  Home use only.  Patient not taking: Reported on 2/2/2025 8/5/20   Ciara Gary PA-C   naproxen (NAPROSYN) 375 mg tablet Take 1 tablet (375 mg total) by mouth 2 (two) times a day with meals 5/21/23   Cole Tai MD   norethindrone (AYGESTIN) 5 mg tablet  Take 1 tablet (5 mg total) by mouth daily 11/8/22 1/15/23  C William Riedel, DO   diclofenac potassium (CATAFLAM) 50 mg tablet Take 50 mg by mouth 2 (two) times a day as needed 8/29/24 4/11/25  Historical Provider, MD   DULoxetine (CYMBALTA) 60 mg delayed release capsule Take 60 mg by mouth daily  Patient not taking: Reported on 2/2/2025 1/31/19 4/11/25  Historical Provider, MD   fluticasone (FLONASE) 50 mcg/act nasal spray 2 sprays into each nostril daily  Patient not taking: Reported on 2/2/2025 2/15/20 4/11/25  Cristino Celis PA-C   levothyroxine 75 mcg tablet Take 75 mcg by mouth daily  Patient not taking: Reported on 4/11/2025 9/2/22 4/11/25  Historical Provider, MD   methocarbamol (ROBAXIN) 500 mg tablet Take 1 tablet (500 mg total) by mouth 2 (two) times a day 11/8/23 4/11/25  Santiago Wild MD   naproxen (NAPROSYN) 500 mg tablet Take 1 tablet (500 mg total) by mouth 2 (two) times a day with meals  Patient not taking: Reported on 2/2/2025 6/20/20 4/11/25  Maxine Gonzalez DO   naproxen (NAPROSYN) 500 mg tablet Take 1 tablet (500 mg total) by mouth 2 (two) times a day with meals  Patient not taking: Reported on 2/2/2025 11/8/22 4/11/25  C William Riedel, DO   ondansetron (ZOFRAN) 4 mg tablet Take by mouth  Patient not taking: Reported on 2/2/2025 4/11/25  Historical Provider, MD   ondansetron (ZOFRAN-ODT) 4 mg disintegrating tablet Take 1 tablet (4 mg total) by mouth every 6 (six) hours as needed for nausea or vomiting  Patient not taking: Reported on 2/2/2025 8/18/21 4/11/25  Yodit Melendez PA-C   predniSONE 20 mg tablet One po bid x 5 days.  Take with food  Patient not taking: Reported on 2/2/2025 8/5/20 4/11/25  Ciara Gary PA-C   predniSONE 20 mg tablet Take 1 tablet (20 mg total) by mouth daily  Patient not taking: Reported on 2/2/2025 5/21/23 4/11/25  Cole Tai MD   triamcinolone (KENALOG) 0.1 % cream Apply 1 application topically 2 (two) times a day  Patient not taking: Reported on  2/2/2025 5/15/22 4/11/25  Historical Provider, MD     No Known Allergies    Objective :  Temp:  [99.7 °F (37.6 °C)-100.1 °F (37.8 °C)] 99.7 °F (37.6 °C)  HR:  [104-136] 113  BP: (106-143)/(57-92) 106/71  Resp:  [18-26] 18  SpO2:  [94 %-99 %] 94 %  O2 Device: None (Room air)  Nasal Cannula O2 Flow Rate (L/min):  [2 L/min] 2 L/min    Physical Exam  Constitutional:       General: She is not in acute distress.     Appearance: She is ill-appearing and diaphoretic. She is not toxic-appearing.   HENT:      Head: Normocephalic and atraumatic.   Eyes:      Conjunctiva/sclera: Conjunctivae normal.   Cardiovascular:      Rate and Rhythm: Regular rhythm. Tachycardia present.      Heart sounds: Normal heart sounds.   Pulmonary:      Breath sounds: Wheezing present.      Comments: Minimal wheezing  Abdominal:      General: Bowel sounds are normal.      Palpations: Abdomen is soft.   Musculoskeletal:      Right lower leg: No edema.      Left lower leg: No edema.   Skin:     General: Skin is warm.   Neurological:      Mental Status: She is alert and oriented to person, place, and time.   Psychiatric:         Mood and Affect: Mood normal.         Behavior: Behavior normal.         Thought Content: Thought content normal.         Judgment: Judgment normal.        Lines/Drains:            Lab Results: I have reviewed the following results:  Results from last 7 days   Lab Units 04/11/25  2137   WBC Thousand/uL 7.57   HEMOGLOBIN g/dL 13.6   HEMATOCRIT % 39.3   PLATELETS Thousands/uL 221   SEGS PCT % 78*   LYMPHO PCT % 14   MONO PCT % 8   EOS PCT % 0     Results from last 7 days   Lab Units 04/11/25  2137   SODIUM mmol/L 135   POTASSIUM mmol/L 3.2*   CHLORIDE mmol/L 100   CO2 mmol/L 23   BUN mg/dL 9   CREATININE mg/dL 0.88   ANION GAP mmol/L 12   CALCIUM mg/dL 9.0   ALBUMIN g/dL 4.1   TOTAL BILIRUBIN mg/dL 0.43   ALK PHOS U/L 93   ALT U/L 34   AST U/L 32   GLUCOSE RANDOM mg/dL 126             Lab Results   Component Value Date    HGBA1C  5.8 (H) 03/12/2020    HGBA1C 5.6 08/10/2019    HGBA1C 5.3 04/13/2018           Imaging Results Review: I reviewed radiology reports from this admission including: chest xray.  Other Study Results Review: EKG was reviewed.     Administrative Statements       ** Please Note: This note has been constructed using a voice recognition system. **

## 2025-04-12 NOTE — PLAN OF CARE
Problem: Potential for Falls  Goal: Patient will remain free of falls  Description: INTERVENTIONS:- Educate patient/family on patient safety including physical limitations- Instruct patient to call for assistance with activity - Consult OT/PT to assist with strengthening/mobility - Keep Call bell within reach- Keep bed low and locked with side rails adjusted as appropriate- Keep care items and personal belongings within reach- Initiate and maintain comfort rounds- Make Fall Risk Sign visible to staff  Outcome: Progressing     Problem: DISCHARGE PLANNING  Goal: Discharge to home or other facility with appropriate resources  Description: INTERVENTIONS:- Identify barriers to discharge w/patient and caregiver- Arrange for needed discharge resources and transportation as appropriate- Identify discharge learning needs (meds, wound care, etc.)- Arrange for interpretive services to assist at discharge as needed- Refer to Case Management Department for coordinating discharge planning if the patient needs post-hospital services based on physician/advanced practitioner order or complex needs related to functional status, cognitive ability, or social support system  Outcome: Progressing     Problem: Knowledge Deficit  Goal: Patient/family/caregiver demonstrates understanding of disease process, treatment plan, medications, and discharge instructions  Description: Complete learning assessment and assess knowledge base.Interventions:- Provide teaching at level of understanding- Provide teaching via preferred learning methods  Outcome: Progressing     Problem: RESPIRATORY - ADULT  Goal: Achieves optimal ventilation and oxygenation  Description: INTERVENTIONS:- Assess for changes in respiratory status- Assess for changes in mentation and behavior- Position to facilitate oxygenation and minimize respiratory effort- Oxygen administered by appropriate delivery if ordered- Initiate smoking cessation education as indicated- Encourage  broncho-pulmonary hygiene including cough, deep breathe, Incentive Spirometry- Assess the need for suctioning and aspirate as needed- Assess and instruct to report SOB or any respiratory difficulty- Respiratory Therapy support as indicated  Outcome: Progressing

## 2025-04-12 NOTE — PLAN OF CARE
Problem: DISCHARGE PLANNING  Goal: Discharge to home or other facility with appropriate resources  Description: INTERVENTIONS:- Identify barriers to discharge w/patient and caregiver- Arrange for needed discharge resources and transportation as appropriate- Identify discharge learning needs (meds, wound care, etc.)- Arrange for interpretive services to assist at discharge as needed- Refer to Case Management Department for coordinating discharge planning if the patient needs post-hospital services based on physician/advanced practitioner order or complex needs related to functional status, cognitive ability, or social support system  Reactivated     Problem: Knowledge Deficit  Goal: Patient/family/caregiver demonstrates understanding of disease process, treatment plan, medications, and discharge instructions  Description: Complete learning assessment and assess knowledge base.Interventions:- Provide teaching at level of understanding- Provide teaching via preferred learning methods  Reactivated     Problem: RESPIRATORY - ADULT  Goal: Achieves optimal ventilation and oxygenation  Description: INTERVENTIONS:- Assess for changes in respiratory status- Assess for changes in mentation and behavior- Position to facilitate oxygenation and minimize respiratory effort- Oxygen administered by appropriate delivery if ordered- Initiate smoking cessation education as indicated- Encourage broncho-pulmonary hygiene including cough, deep breathe, Incentive Spirometry- Assess the need for suctioning and aspirate as needed- Assess and instruct to report SOB or any respiratory difficulty- Respiratory Therapy support as indicated  Reactivated

## 2025-04-12 NOTE — ASSESSMENT & PLAN NOTE
Follows with pain medicine at [facility].  Takes ibuprofen 800 mg as needed and muscle relaxant

## 2025-04-13 LAB
ANION GAP SERPL CALCULATED.3IONS-SCNC: 6 MMOL/L (ref 4–13)
BUN SERPL-MCNC: 12 MG/DL (ref 5–25)
CALCIUM SERPL-MCNC: 8.7 MG/DL (ref 8.4–10.2)
CHLORIDE SERPL-SCNC: 107 MMOL/L (ref 96–108)
CO2 SERPL-SCNC: 27 MMOL/L (ref 21–32)
CREAT SERPL-MCNC: 0.81 MG/DL (ref 0.6–1.3)
ERYTHROCYTE [DISTWIDTH] IN BLOOD BY AUTOMATED COUNT: 12.2 % (ref 11.6–15.1)
GFR SERPL CREATININE-BSD FRML MDRD: 86 ML/MIN/1.73SQ M
GLUCOSE P FAST SERPL-MCNC: 144 MG/DL (ref 65–99)
GLUCOSE SERPL-MCNC: 144 MG/DL (ref 65–140)
HCT VFR BLD AUTO: 35.9 % (ref 34.8–46.1)
HGB BLD-MCNC: 12 G/DL (ref 11.5–15.4)
MCH RBC QN AUTO: 31.3 PG (ref 26.8–34.3)
MCHC RBC AUTO-ENTMCNC: 33.4 G/DL (ref 31.4–37.4)
MCV RBC AUTO: 94 FL (ref 82–98)
PLATELET # BLD AUTO: 218 THOUSANDS/UL (ref 149–390)
PMV BLD AUTO: 10.5 FL (ref 8.9–12.7)
POTASSIUM SERPL-SCNC: 4.2 MMOL/L (ref 3.5–5.3)
PROCALCITONIN SERPL-MCNC: 0.29 NG/ML
RBC # BLD AUTO: 3.84 MILLION/UL (ref 3.81–5.12)
SODIUM SERPL-SCNC: 140 MMOL/L (ref 135–147)
WBC # BLD AUTO: 13.57 THOUSAND/UL (ref 4.31–10.16)

## 2025-04-13 PROCEDURE — 84145 PROCALCITONIN (PCT): CPT | Performed by: INTERNAL MEDICINE

## 2025-04-13 PROCEDURE — 85027 COMPLETE CBC AUTOMATED: CPT | Performed by: INTERNAL MEDICINE

## 2025-04-13 PROCEDURE — 80048 BASIC METABOLIC PNL TOTAL CA: CPT | Performed by: INTERNAL MEDICINE

## 2025-04-13 PROCEDURE — 99232 SBSQ HOSP IP/OBS MODERATE 35: CPT | Performed by: PHYSICIAN ASSISTANT

## 2025-04-13 PROCEDURE — 94760 N-INVAS EAR/PLS OXIMETRY 1: CPT

## 2025-04-13 PROCEDURE — 94640 AIRWAY INHALATION TREATMENT: CPT

## 2025-04-13 RX ORDER — METHYLPREDNISOLONE SODIUM SUCCINATE 40 MG/ML
40 INJECTION, POWDER, LYOPHILIZED, FOR SOLUTION INTRAMUSCULAR; INTRAVENOUS DAILY
Status: DISCONTINUED | OUTPATIENT
Start: 2025-04-14 | End: 2025-04-14 | Stop reason: HOSPADM

## 2025-04-13 RX ADMIN — METHOCARBAMOL 1000 MG: 500 TABLET ORAL at 14:54

## 2025-04-13 RX ADMIN — BENZONATATE 100 MG: 100 CAPSULE ORAL at 09:21

## 2025-04-13 RX ADMIN — IPRATROPIUM BROMIDE 0.5 MG: 0.5 SOLUTION RESPIRATORY (INHALATION) at 19:32

## 2025-04-13 RX ADMIN — GUAIFENESIN 600 MG: 600 TABLET ORAL at 21:30

## 2025-04-13 RX ADMIN — SENNOSIDES AND DOCUSATE SODIUM 1 TABLET: 50; 8.6 TABLET ORAL at 09:21

## 2025-04-13 RX ADMIN — LEVOTHYROXINE SODIUM 88 MCG: 88 TABLET ORAL at 05:06

## 2025-04-13 RX ADMIN — BUDESONIDE INHALATION 0.5 MG: 0.5 SUSPENSION RESPIRATORY (INHALATION) at 07:30

## 2025-04-13 RX ADMIN — PANTOPRAZOLE SODIUM 40 MG: 40 TABLET, DELAYED RELEASE ORAL at 05:06

## 2025-04-13 RX ADMIN — DOCUSATE SODIUM 100 MG: 100 CAPSULE, LIQUID FILLED ORAL at 17:28

## 2025-04-13 RX ADMIN — ENOXAPARIN SODIUM 40 MG: 40 INJECTION SUBCUTANEOUS at 09:21

## 2025-04-13 RX ADMIN — SENNOSIDES AND DOCUSATE SODIUM 1 TABLET: 50; 8.6 TABLET ORAL at 17:28

## 2025-04-13 RX ADMIN — BENZONATATE 100 MG: 100 CAPSULE ORAL at 15:51

## 2025-04-13 RX ADMIN — LORATADINE 10 MG: 10 TABLET ORAL at 09:21

## 2025-04-13 RX ADMIN — OSELTAMIVIR PHOSPHATE 75 MG: 75 CAPSULE ORAL at 21:30

## 2025-04-13 RX ADMIN — GUAIFENESIN AND DEXTROMETHORPHAN 10 ML: 20; 200 SYRUP ORAL at 05:09

## 2025-04-13 RX ADMIN — LEVALBUTEROL HYDROCHLORIDE 1.25 MG: 1.25 SOLUTION RESPIRATORY (INHALATION) at 19:32

## 2025-04-13 RX ADMIN — IBUPROFEN 800 MG: 400 TABLET, FILM COATED ORAL at 14:54

## 2025-04-13 RX ADMIN — OSELTAMIVIR PHOSPHATE 75 MG: 75 CAPSULE ORAL at 09:21

## 2025-04-13 RX ADMIN — DOCUSATE SODIUM 100 MG: 100 CAPSULE, LIQUID FILLED ORAL at 09:21

## 2025-04-13 RX ADMIN — GUAIFENESIN 600 MG: 600 TABLET ORAL at 09:21

## 2025-04-13 RX ADMIN — IPRATROPIUM BROMIDE 0.5 MG: 0.5 SOLUTION RESPIRATORY (INHALATION) at 13:19

## 2025-04-13 RX ADMIN — LEVALBUTEROL HYDROCHLORIDE 1.25 MG: 1.25 SOLUTION RESPIRATORY (INHALATION) at 07:29

## 2025-04-13 RX ADMIN — METHYLPREDNISOLONE SODIUM SUCCINATE 40 MG: 40 INJECTION, POWDER, FOR SOLUTION INTRAMUSCULAR; INTRAVENOUS at 09:21

## 2025-04-13 RX ADMIN — BENZONATATE 100 MG: 100 CAPSULE ORAL at 21:30

## 2025-04-13 RX ADMIN — DEXTROSE 1000 MG: 50 INJECTION, SOLUTION INTRAVENOUS at 02:05

## 2025-04-13 RX ADMIN — IPRATROPIUM BROMIDE 0.5 MG: 0.5 SOLUTION RESPIRATORY (INHALATION) at 07:29

## 2025-04-13 RX ADMIN — AZITHROMYCIN DIHYDRATE 500 MG: 250 TABLET, FILM COATED ORAL at 12:11

## 2025-04-13 RX ADMIN — LEVALBUTEROL HYDROCHLORIDE 1.25 MG: 1.25 SOLUTION RESPIRATORY (INHALATION) at 13:19

## 2025-04-13 RX ADMIN — BUDESONIDE INHALATION 0.5 MG: 0.5 SUSPENSION RESPIRATORY (INHALATION) at 19:32

## 2025-04-13 NOTE — PLAN OF CARE
Problem: Potential for Falls  Goal: Patient will remain free of falls  Description: INTERVENTIONS:- Educate patient/family on patient safety including physical limitations- Instruct patient to call for assistance with activity - Consult OT/PT to assist with strengthening/mobility - Keep Call bell within reach- Keep bed low and locked with side rails adjusted as appropriate- Keep care items and personal belongings within reach- Initiate and maintain comfort rounds- Make Fall Risk Sign visible to staff-  Apply yellow socks and bracelet for high fall risk patients- Consider moving patient to room near nurses station  4/13/2025 1029 by Dariela Avila RN  Outcome: Progressing  4/13/2025 1029 by Dariela Avila RN  Outcome: Progressing     Problem: DISCHARGE PLANNING  Goal: Discharge to home or other facility with appropriate resources  Description: INTERVENTIONS:- Identify barriers to discharge w/patient and caregiver- Arrange for needed discharge resources and transportation as appropriate- Identify discharge learning needs (meds, wound care, etc.)- Arrange for interpretive services to assist at discharge as needed- Refer to Case Management Department for coordinating discharge planning if the patient needs post-hospital services based on physician/advanced practitioner order or complex needs related to functional status, cognitive ability, or social support system  4/13/2025 1029 by Dariela Avila RN  Outcome: Progressing  4/13/2025 1029 by Dariela Avila RN  Outcome: Progressing     Problem: Knowledge Deficit  Goal: Patient/family/caregiver demonstrates understanding of disease process, treatment plan, medications, and discharge instructions  Description: Complete learning assessment and assess knowledge base.Interventions:- Provide teaching at level of understanding- Provide teaching via preferred learning methods  4/13/2025 1029 by Draiela Avila RN  Outcome: Progressing  4/13/2025 1029 by  Dariela Avila RN  Outcome: Progressing     Problem: RESPIRATORY - ADULT  Goal: Achieves optimal ventilation and oxygenation  Description: INTERVENTIONS:- Assess for changes in respiratory status- Assess for changes in mentation and behavior- Position to facilitate oxygenation and minimize respiratory effort- Oxygen administered by appropriate delivery if ordered- Initiate smoking cessation education as indicated- Encourage broncho-pulmonary hygiene including cough, deep breathe, Incentive Spirometry- Assess the need for suctioning and aspirate as needed- Assess and instruct to report SOB or any respiratory difficulty- Respiratory Therapy support as indicated  4/13/2025 1029 by Dariela Avila RN  Outcome: Progressing  4/13/2025 1029 by Dariela Avila RN  Outcome: Progressing

## 2025-04-13 NOTE — PROGRESS NOTES
Progress Note - Hospitalist   Name: Ruth Ann Durán 49 y.o. female I MRN: [id number]  Unit/Bed#: E5 -01 I Date of Admission: 4/11/2025   Date of Service: 4/13/2025 I Hospital Day: 0    Assessment & Plan  Moderate persistent asthma with acute exacerbation  Chest tightness and wheezing for 3 days.    Using nebulizer 4 times daily and albuterol inhaler.  Reports little improvement   Influenza A positive  Will treat with Xopenex and ipratropium 4 times daily, Pulmicort nebulized twice daily  IV Solu-Medrol 40 mg q 12 hours --> taper to daily dosing  Supportive care with IV fluids and antitussives  Continue ceftriaxone day #2 of 5  Continue azithromycin day #2 of 5  Continue Tamiflu day #2 of 5  Consider possible concomitant gram negative pneumonia, no history of MRSA but would broaden out should patient have no improvement  Noted elevating procalcitonin 0.13 -> 0.29 & elevating white count from 7--> 13  Influenza A  Found to be positive for influenza A  Started on Tamiflu-day number 2 out of 5  Acquired hypothyroidism  Continue levothyroxine 88 mcg  Hypokalemia  Recent Labs     04/11/25  2137 04/12/25  0520 04/13/25  0546   K 3.2* 3.7 4.2   Potassium low at 3.2-repleted and resolved  Chronic bilateral low back pain without sciatica  Follows with pain medicine at [facility].  Takes ibuprofen 800 mg as needed and muscle relaxant  Class 1 obesity  Weight loss, exercise, dietary and lifestyle modifications    VTE Pharmacologic Prophylaxis:   Lovenox    Mobility:   Basic Mobility Inpatient Raw Score: 24  -HLM Goal: 8: Walk 250 feet or more  JH-HLM Achieved: 8: Walk 250 feet ot more    Patient Centered Rounds: I performed bedside rounds with nursing staff today.   Discussions with Specialists or Other Care Team Provider: Nursing    Education and Discussions with Family / Patient: Patient    Current Length of Stay: 0 day(s)  Current Patient Status: Observation   Certification Statement: With need for continued inpatient  stay for asthma exacerbation along with IV antibiotics for pneumonia  Discharge Plan: 24 hours    Code Status: Level 1 - Full Code    Subjective   Resting in bed.  Coughing on exam. She will have fits where it is hard to catch her breath.  Has difficultly catching her breath due to coughing fits.  Discussed ongoing treatment for influenza, possible pneumonia, and asthma.    Objective :  Temp:  [98 °F (36.7 °C)-98.1 °F (36.7 °C)] 98.1 °F (36.7 °C)  HR:  [67-84] 67  BP: (104-106)/(59-64) 106/64  Resp:  [15-18] 18  SpO2:  [94 %-98 %] 98 %  O2 Device: None (Room air)    Body mass index is 32.12 kg/m².     Input and Output Summary (last 24 hours):     Intake/Output Summary (Last 24 hours) at 4/13/2025 1254  Last data filed at 4/13/2025 0857  Gross per 24 hour   Intake 240 ml   Output --   Net 240 ml       Physical Exam  Vitals and nursing note reviewed.   Constitutional:       General: She is not in acute distress.     Appearance: She is obese. She is not ill-appearing, toxic-appearing or diaphoretic.      Comments: Coughing on exam   HENT:      Head: Normocephalic and atraumatic.   Eyes:      General: No scleral icterus.  Cardiovascular:      Rate and Rhythm: Normal rate and regular rhythm.   Pulmonary:      Effort: No respiratory distress.      Breath sounds: No stridor. Rhonchi present. No wheezing.      Comments: Mild scattered wheeze with scattered rhonchi  Abdominal:      General: Bowel sounds are normal. There is no distension.      Palpations: Abdomen is soft. There is no mass.      Tenderness: There is no abdominal tenderness.      Hernia: No hernia is present.   Musculoskeletal:         General: No swelling.      Cervical back: Neck supple.   Skin:     General: Skin is warm and dry.   Neurological:      Mental Status: She is alert and oriented to person, place, and time. Mental status is at baseline.   Psychiatric:         Mood and Affect: Mood normal.         Behavior: Behavior normal.             Lab Results:  I have reviewed the following results:   Results from last 7 days   Lab Units 04/13/25  0546 04/12/25  0520   WBC Thousand/uL 13.57* 7.75   HEMOGLOBIN g/dL 12.0 12.3   HEMATOCRIT % 35.9 36.6   PLATELETS Thousands/uL 218 219   SEGS PCT %  --  93*   LYMPHO PCT %  --  5*   MONO PCT %  --  2*   EOS PCT %  --  0     Results from last 7 days   Lab Units 04/13/25  0546 04/12/25  0520 04/11/25  2137   SODIUM mmol/L 140   < > 135   POTASSIUM mmol/L 4.2   < > 3.2*   CHLORIDE mmol/L 107   < > 100   CO2 mmol/L 27   < > 23   BUN mg/dL 12   < > 9   CREATININE mg/dL 0.81   < > 0.88   ANION GAP mmol/L 6   < > 12   CALCIUM mg/dL 8.7   < > 9.0   ALBUMIN g/dL  --   --  4.1   TOTAL BILIRUBIN mg/dL  --   --  0.43   ALK PHOS U/L  --   --  93   ALT U/L  --   --  34   AST U/L  --   --  32   GLUCOSE RANDOM mg/dL 144*   < > 126    < > = values in this interval not displayed.         Results from last 7 days   Lab Units 04/12/25  1554   POC GLUCOSE mg/dl 127         Results from last 7 days   Lab Units 04/13/25  0546 04/11/25  2137   PROCALCITONIN ng/ml 0.29* 0.13       Recent Cultures (last 7 days):   Results from last 7 days   Lab Units 04/12/25  1307 04/12/25  1215   SPUTUM CULTURE   --  Test not performed. Suggest repeat specimen.   GRAM STAIN RESULT   --  3+ Epithelial cells per low power field*  2+ Gram positive cocci in clusters*  4+ Gram negative rods*  No polys seen*   LEGIONELLA URINARY ANTIGEN  Negative  --              Last 24 Hours Medication List:     Current Facility-Administered Medications:     acetaminophen (TYLENOL) tablet 975 mg, Q6H PRN    albuterol inhalation solution 2.5 mg, Q4H PRN    aluminum-magnesium hydroxide-simethicone (MAALOX) oral suspension 30 mL, Q6H PRN    azithromycin (ZITHROMAX) tablet 500 mg, Q24H    benzonatate (TESSALON PERLES) capsule 100 mg, TID    budesonide (PULMICORT) inhalation solution 0.5 mg, Q12H    cefTRIAXone (ROCEPHIN) 1,000 mg in dextrose 5 % 50 mL IVPB, Q24H, Last Rate: 1,000 mg  (04/13/25 0205)    dextromethorphan-guaiFENesin (ROBITUSSIN DM) oral syrup 10 mL, Q4H PRN    docusate sodium (COLACE) capsule 100 mg, BID    enoxaparin (LOVENOX) subcutaneous injection 40 mg, Daily    guaiFENesin (MUCINEX) 12 hr tablet 600 mg, Q12H MARK    hydrOXYzine HCL (ATARAX) tablet 25 mg, Q6H PRN    ibuprofen (MOTRIN) tablet 800 mg, Q6H PRN    ipratropium (ATROVENT) 0.02 % inhalation solution 0.5 mg, TID    levalbuterol (XOPENEX) inhalation solution 1.25 mg, TID **AND** [DISCONTINUED] sodium chloride 0.9 % inhalation solution 3 mL, 4x Daily    levothyroxine tablet 88 mcg, Early Morning    loratadine (CLARITIN) tablet 10 mg, Daily    methocarbamol (ROBAXIN) tablet 1,000 mg, Q6H PRN    methylPREDNISolone sodium succinate (Solu-MEDROL) injection 40 mg, Q12H MARK    ondansetron (ZOFRAN) injection 4 mg, Q6H PRN    oseltamivir (TAMIFLU) capsule 75 mg, Q12H MARK    pantoprazole (PROTONIX) EC tablet 40 mg, Early Morning    senna-docusate sodium (SENOKOT S) 8.6-50 mg per tablet 1 tablet, BID    Administrative Statements   Today, Patient Was Seen By: Lizett Elam PA-C      **Please Note: This note may have been constructed using a voice recognition system.**

## 2025-04-13 NOTE — ASSESSMENT & PLAN NOTE
Follows with pain medicine at [facility].  Takes ibuprofen 800 mg as needed and muscle relaxant

## 2025-04-13 NOTE — ASSESSMENT & PLAN NOTE
Recent Labs     04/11/25  2137 04/12/25  0520 04/13/25  0546   K 3.2* 3.7 4.2   Potassium low at 3.2-repleted and resolved

## 2025-04-13 NOTE — ASSESSMENT & PLAN NOTE
Chest tightness and wheezing for 3 days.    Using nebulizer 4 times daily and albuterol inhaler.  Reports little improvement   Influenza A positive  Will treat with Xopenex and ipratropium 4 times daily, Pulmicort nebulized twice daily  IV Solu-Medrol 40 mg q 12 hours --> taper to daily dosing  Supportive care with IV fluids and antitussives  Continue ceftriaxone day #2 of 5  Continue azithromycin day #2 of 5  Continue Tamiflu day #2 of 5  Consider possible concomitant gram negative pneumonia, no history of MRSA but would broaden out should patient have no improvement  Noted elevating procalcitonin 0.13 -> 0.29 & elevating white count from 7--> 13

## 2025-04-13 NOTE — PLAN OF CARE
Problem: DISCHARGE PLANNING  Goal: Discharge to home or other facility with appropriate resources  Description: INTERVENTIONS:- Identify barriers to discharge w/patient and caregiver- Arrange for needed discharge resources and transportation as appropriate- Identify discharge learning needs (meds, wound care, etc.)- Arrange for interpretive services to assist at discharge as needed- Refer to Case Management Department for coordinating discharge planning if the patient needs post-hospital services based on physician/advanced practitioner order or complex needs related to functional status, cognitive ability, or social support system  Outcome: Progressing     Problem: RESPIRATORY - ADULT  Goal: Achieves optimal ventilation and oxygenation  Description: INTERVENTIONS:- Assess for changes in respiratory status- Assess for changes in mentation and behavior- Position to facilitate oxygenation and minimize respiratory effort- Oxygen administered by appropriate delivery if ordered- Initiate smoking cessation education as indicated- Encourage broncho-pulmonary hygiene including cough, deep breathe, Incentive Spirometry- Assess the need for suctioning and aspirate as needed- Assess and instruct to report SOB or any respiratory difficulty- Respiratory Therapy support as indicated  Outcome: Progressing     Problem: Knowledge Deficit  Goal: Patient/family/caregiver demonstrates understanding of disease process, treatment plan, medications, and discharge instructions  Description: Complete learning assessment and assess knowledge base.Interventions:- Provide teaching at level of understanding- Provide teaching via preferred learning methods  Outcome: Progressing

## 2025-04-13 NOTE — UTILIZATION REVIEW
Initial Clinical Review    Pt initially admitted as Observation on 4/11. Changed to Inpatient on 4/13. Pt requiring continued stay d/t influenza.    Admission: Date/Time/Statement:   Admission Orders (From admission, onward)       Ordered        04/13/25 1337  INPATIENT ADMISSION  Once            04/11/25 2326  Place in Observation  Once                          Orders Placed This Encounter   Procedures    INPATIENT ADMISSION     Standing Status:   Standing     Number of Occurrences:   1     Level of Care:   Med Surg [16]     Estimated length of stay:   More than 2 Midnights     Certification:   I certify that inpatient services are medically necessary for this patient for a duration of greater than two midnights. See H&P and MD Progress Notes for additional information about the patient's course of treatment.     ED Arrival Information       Expected   -    Arrival   4/11/2025 21:26    Acuity   Emergent              Means of arrival   Walk-In    Escorted by   Self    Service   Hospitalist    Admission type   Emergency              Arrival complaint   SOB             Chief Complaint   Patient presents with    Shortness of Breath     Patient dropped off at EMS bay with SO stating she is unable to breathe. Patient states she has asthma and can't breath. Last albuterol taken at 7pm.       Initial Presentation: 49 y.o. female who presented self from home to [facility] ED.  Pertinent PMHx: asthma. Presented w/ difficulty breathing, worsening over several days. Endorses fever, SOB, chest tightness, wheezing, sore throat, n/v/d, headache. Notes nebs at home QID, and albuterol inhaler; last inhaler use @ 1900. Endorses low back pain - takes 800 mg aspirin. ED: duonebs x2, IM epi, IVF 1L, IV solu-medrol 125 mg x1, IV Zofran x1, IV mag 2g x1, IV KCl 20 mEq x1. EXAM: tachypnea (26), tachycardia (104-136), decreased air movement, diaphoresis. Influenza A positive. K 3.2. CXR unremarkable. Admitted as Observation for  evaluation and treatment of asthma exacerbation and flu. PLAN: nebs - Xopenex/ipratropium QID, pulmicort neb BID, IV solu-medrol 40 mg q8h, IVF, antitussive, IV ABX, continue PTA meds, Trend labs, replete electrolytes as needed.    4/12: No further n/v. Exam: diffuse wheezing, cough. Plan: nebs, IV solu-medrol 40 mg q12h, IVF, IV ABX, Tamiflu. Possible concomitant gram negative PNA. Supportive care. Trend labs, replete electrolytes as needed.    Date: 04/13/25   Changed to Inpatient Status: Dx Influenza. Coughing on exam. Notes fits where difficulty catching breath in addition to during coughing fits. Exam: rhonchi, wheezing. WBC 13.57. Procal 0.29. Plan: nebs, IV solu-medrol 40 mg daily, IVF, IV ABX, Tamiflu. Trend WBC & procal, continue other current meds, Trend labs, replete electrolytes as needed.    04/14/25    Day 2: Pt reports continued feeling poorly. Endorses headache and poor appetite. Exam: wheezing. Plan: continue nebs QID, IV solu-medrol daily, IV ABX, Tamiflu, try fiorcet for headache. Continue current meds. Trend labs, replete electrolytes as needed.    ED Treatment-Medication Administration from 04/11/2025 2126 to 04/12/2025 0015         Date/Time Order Dose Route Action     04/11/2025 2130 albuterol inhalation solution 5 mg 5 mg Nebulization Given     04/11/2025 2131 ipratropium (ATROVENT) 0.02 % inhalation solution 0.5 mg 0.5 mg Nebulization Given     04/11/2025 2155 albuterol inhalation solution 10 mg 10 mg Nebulization Given     04/11/2025 2155 ipratropium (ATROVENT) 0.02 % inhalation solution 1 mg 1 mg Nebulization Given     04/11/2025 2155 sodium chloride 0.9 % inhalation solution 12 mL 12 mL Nebulization Given     04/11/2025 2146 EPINEPHrine PF (ADRENALIN) 1 mg/mL injection 0.3 mg 0.3 mg Intramuscular Given     04/11/2025 2140 magnesium sulfate 2 g/50 mL IVPB (premix) 2 g 2 g Intravenous New Bag     04/11/2025 7552 sodium chloride 0.9 % bolus 1,000 mL 1,000 mL Intravenous New Bag      04/11/2025 2141 methylPREDNISolone sodium succinate (Solu-MEDROL) injection 125 mg 125 mg Intravenous Given     04/11/2025 2209 potassium chloride 20 mEq IVPB (premix) 20 mEq Intravenous New Bag     04/11/2025 2213 ondansetron (ZOFRAN) injection 4 mg 4 mg Intravenous Given            Scheduled Medications:  azithromycin, 500 mg, Oral, Q24H  benzonatate, 100 mg, Oral, TID  budesonide, 0.5 mg, Nebulization, Q12H  cefTRIAXone, 1,000 mg, Intravenous, Q24H  docusate sodium, 100 mg, Oral, BID  enoxaparin, 40 mg, Subcutaneous, Daily  guaiFENesin, 600 mg, Oral, Q12H MARK  ipratropium, 0.5 mg, Nebulization, TID  levalbuterol, 1.25 mg, Nebulization, TID  levothyroxine, 88 mcg, Oral, Early Morning  loratadine, 10 mg, Oral, Daily  methylPREDNISolone sodium succinate, 40 mg, Intravenous, Daily  oseltamivir, 75 mg, Oral, Q12H MARK  pantoprazole, 40 mg, Oral, Early Morning  senna-docusate sodium, 1 tablet, Oral, BID    Continuous IV Infusions:  sodium chloride 0.9 %, 100 mL/hr, Intravenous, Continuous; Stopped (04/12/25 1125)     PRN Meds:  acetaminophen, 975 mg, Oral, Q6H PRN; 4/12 x1  albuterol, 2.5 mg, Nebulization, Q4H PRN  aluminum-magnesium hydroxide-simethicone, 30 mL, Oral, Q6H PRN; 4/12 x1  dextromethorphan-guaiFENesin, 10 mL, Oral, Q4H PRN; 4/12 x2, 4/13 x1  hydrOXYzine HCL, 25 mg, Oral, Q6H PRN  ibuprofen, 800 mg, Oral, Q6H PRN; 4/12 x2, 4/13 x1, 4/14 x1  methocarbamol, 1,000 mg, Oral, Q6H PRN; 4/12 x2, 4/13 x1  ondansetron, 4 mg, Intravenous, Q6H PRN    aluminum-magnesium hydroxide-simethicone (MAALOX) oral suspension 30 mL  Dose: 30 mL Freq: Once Route: PO  Start: 04/12/25 1145 End: 04/12/25 1205  Hydrocod Codey-Chlorphe Codey ER (TUSSIONEX) ER suspension 5 mL  Dose: 5 mL Freq: Once Route: PO  Start: 04/12/25 1145 End: 04/12/25 1211    methylPREDNISolone sodium succinate (Solu-MEDROL) injection 40 mg  Dose: 40 mg  Freq: Every 12 hours scheduled Route: IV  Start: 04/12/25 2100 End: 04/13/25 1339      methylPREDNISolone  sodium succinate (Solu-MEDROL) injection 40 mg  Dose: 40 mg  Freq: Every 8 hours scheduled Route: IV  Start: 04/12/25 0600 End: 04/12/25 1123      methylPREDNISolone sodium succinate (Solu-MEDROL) injection 40 mg  Dose: 40 mg  Freq: Every 6 hours scheduled Route: IV  Start: 04/12/25 0030 End: 04/12/25 0206            ED Triage Vitals   Temperature Pulse Respirations Blood Pressure SpO2 Pain Score   04/11/25 2128 04/11/25 2128 04/11/25 2130 04/11/25 2128 04/11/25 2128 04/12/25 0023   100.1 °F (37.8 °C) 104 (!) 26 143/92 97 % 8     Weight (last 2 days)       Date/Time Weight    04/12/25 00:23:04 90.3 (199)            Vital Signs (last 3 days)       Date/Time Temp Pulse Resp BP MAP (mmHg) SpO2 Calculated FIO2 (%) - Nasal Cannula Nasal Cannula O2 Flow Rate (L/min) O2 Device Patient Position - Orthostatic VS Judy Coma Scale Score Pain    04/14/25 0913 -- -- -- -- -- -- -- -- -- -- -- 6    04/14/25 0911 -- -- -- -- -- 100 % -- -- None (Room air) -- -- --    04/14/25 07:38:20 98.3 °F (36.8 °C) 59 17 126/55 79 100 % -- -- None (Room air) Lying -- --    04/14/25 0723 -- -- -- -- -- 96 % -- -- None (Room air) -- -- --    04/13/25 2300 -- -- 18 -- -- -- -- -- None (Room air) Lying -- --    04/13/25 22:59:25 98.1 °F (36.7 °C) 66 -- 117/66 83 97 % -- -- -- -- -- --    04/13/25 1932 -- -- -- -- -- 98 % -- -- -- -- -- --    04/13/25 1930 -- -- -- -- -- -- -- -- -- -- 15 No Pain    04/13/25 15:19:11 98.1 °F (36.7 °C) 73 -- 114/62 79 96 % -- -- -- -- -- --    04/13/25 1319 -- -- -- -- -- 97 % -- -- -- -- -- --    04/13/25 1026 -- -- -- -- -- -- -- -- -- -- 15 No Pain    04/13/25 0730 -- -- -- -- -- 98 % -- -- -- -- -- --    04/13/25 07:26:25 98.1 °F (36.7 °C) 67 18 106/64 78 95 % -- -- -- -- -- --    04/12/25 2342 -- -- -- -- -- -- -- -- -- -- -- 7    04/12/25 23:22:51 98 °F (36.7 °C) 76 15 104/60 75 94 % -- -- None (Room air) Lying -- --    04/12/25 1948 -- -- -- -- -- 94 % -- -- -- -- -- --    04/12/25 1922 -- -- -- -- -- 97 %  -- -- None (Room air) -- 15 --    04/12/25 1919 -- -- -- -- -- -- -- -- -- -- -- 8 04/12/25 15:56:02 98.1 °F (36.7 °C) 84 17 105/59 74 95 % -- -- None (Room air) Lying -- 8 04/12/25 1335 -- -- -- -- -- 96 % -- -- -- -- -- --    04/12/25 0900 -- -- -- -- -- -- -- -- -- -- 15 5 04/12/25 0715 -- -- -- -- -- 96 % -- -- -- -- -- --    04/12/25 06:56:45 97.7 °F (36.5 °C) 75 16 128/65 86 95 % -- -- None (Room air) Lying -- --    04/12/25 0033 -- -- -- -- -- -- -- -- -- -- -- 8 04/12/25 00:23:04 99.7 °F (37.6 °C) 113 18 106/71 83 94 % -- -- -- -- 15 8    04/11/25 2345 -- 124 20 123/57 81 95 % -- -- None (Room air) Lying -- --    04/11/25 2315 -- 136 20 126/65 86 99 % -- -- None (Room air) Lying -- --    04/11/25 2230 -- 120 18 135/67 -- 99 % -- -- -- Lying -- --    04/11/25 2135 -- -- -- -- -- -- -- -- -- -- 15 --    04/11/25 2134 -- -- -- -- -- -- -- -- Nasal cannula -- -- --    04/11/25 2130 -- -- 26 -- -- -- -- -- -- -- -- --    04/11/25 2128 100.1 °F (37.8 °C) 104 -- 143/92 -- 97 % 28 2 L/min Nasal cannula Sitting -- --              Pertinent Labs/Diagnostic Test Results:   Radiology:  X-ray chest 1 view portable   ED Interpretation by Alex Bravo DO (04/11 2250)   No pneumothorax, focal infiltrate or significant effusion      Final Interpretation by Nikole Love MD (04/12 1712)      No acute cardiopulmonary disease.            Workstation performed: [id number]           Cardiology:  ECG 12 lead   Final Result by Chico Lawrence MD (04/12 6768)   Normal sinus rhythm   Normal ECG   When compared with ECG of 11-Apr-2025 21:29, (unconfirmed)   No significant change was found   Confirmed by Chico Lawrence ([id number]) on 4/12/2025 7:57:18 AM               Results from last 7 days   Lab Units 04/14/25  0528 04/13/25  0546 04/12/25  0520 04/11/25  2137   WBC Thousand/uL 10.74* 13.57* 7.75 7.57   HEMOGLOBIN g/dL 11.8 12.0 12.3 13.6   HEMATOCRIT % 34.9 35.9 36.6 39.3   PLATELETS Thousands/uL 222  218 219 221   TOTAL NEUT ABS Thousands/µL  --   --  7.17 5.82         Results from last 7 days   Lab Units 04/14/25  0528 04/13/25  0546 04/12/25  0520 04/11/25  2209 04/11/25  2137   SODIUM mmol/L 140 140 137  --  135   POTASSIUM mmol/L 3.5 4.2 3.7  --  3.2*   CHLORIDE mmol/L 107 107 105  --  100   CO2 mmol/L 26 27 22  --  23   ANION GAP mmol/L 7 6 10  --  12   BUN mg/dL 15 12 9  --  9   CREATININE mg/dL 0.80 0.81 0.96  --  0.88   EGFR ml/min/1.73sq m 87 86 70  --  77   CALCIUM mg/dL 8.4 8.7 8.6  --  9.0   MAGNESIUM mg/dL  --   --   --  2.9*  --      Results from last 7 days   Lab Units 04/11/25  2137   AST U/L 32   ALT U/L 34   ALK PHOS U/L 93   TOTAL PROTEIN g/dL 7.1   ALBUMIN g/dL 4.1   TOTAL BILIRUBIN mg/dL 0.43   BILIRUBIN DIRECT mg/dL 0.10     Results from last 7 days   Lab Units 04/14/25  0738 04/12/25  1554   POC GLUCOSE mg/dl 97 127     Results from last 7 days   Lab Units 04/14/25  0528 04/13/25  0546 04/12/25  0520 04/11/25  2137   GLUCOSE RANDOM mg/dL 88 144* 213* 126      Results from last 7 days   Lab Units 04/11/25  2137   HS TNI 0HR ng/L 3      Results from last 7 days   Lab Units 04/14/25  0528 04/13/25  0546 04/11/25  2137   PROCALCITONIN ng/ml 0.19 0.29* 0.13      Results from last 7 days   Lab Units 04/12/25  1307   STREP PNEUMONIAE ANTIGEN, URINE  Negative   LEGIONELLA URINARY ANTIGEN  Negative      Results from last 7 days   Lab Units 04/11/25  2209   SALICYLATE LVL mg/dL <5      Results from last 7 days   Lab Units 04/12/25  1215   SPUTUM CULTURE  Test not performed. Suggest repeat specimen.   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 female    Network Utilization Review Department  ATTENTION: Please call with any questions or concerns to [phone] and carefully listen to the prompts so that you are directed to the right person. All voicemails are confidential.   For Discharge needs, contact Care Management DC Support Team at [phone] opt. [phone]  Send all requests for admission clinical reviews, approved or denied determinations and any other requests to dedicated fax number below belonging to the [facility] where the patient is receiving treatment. List of dedicated fax numbers for the Facilities:  FACILITY NAME UR FAX NUMBER   ADMISSION DENIALS (Administrative/Medical Necessity) [phone]   DISCHARGE SUPPORT TEAM (NETWORK) [phone]   PARENT CHILD HEALTH (Maternity/NICU/Pediatrics) [phone]   [facility] [phone]   [facility] [phone]   Critical access hospital [phone]   [facility] [phone]   [facility] [phone]   [facility] [phone]   [facility] [phone]   [facility] [phone]   [facility] [phone]   [facility] [phone]   [facility]  [facility] [phone]   [facility] [place] [phone]

## 2025-04-14 VITALS
SYSTOLIC BLOOD PRESSURE: 126 MMHG | HEART RATE: 59 BPM | RESPIRATION RATE: 17 BRPM | HEIGHT: 66 IN | BODY MASS INDEX: 31.98 KG/M2 | DIASTOLIC BLOOD PRESSURE: 55 MMHG | OXYGEN SATURATION: 100 % | WEIGHT: 199 LBS | TEMPERATURE: 98.3 F

## 2025-04-14 LAB
ANION GAP SERPL CALCULATED.3IONS-SCNC: 7 MMOL/L (ref 4–13)
BUN SERPL-MCNC: 15 MG/DL (ref 5–25)
CALCIUM SERPL-MCNC: 8.4 MG/DL (ref 8.4–10.2)
CHLORIDE SERPL-SCNC: 107 MMOL/L (ref 96–108)
CO2 SERPL-SCNC: 26 MMOL/L (ref 21–32)
CREAT SERPL-MCNC: 0.8 MG/DL (ref 0.6–1.3)
ERYTHROCYTE [DISTWIDTH] IN BLOOD BY AUTOMATED COUNT: 12.2 % (ref 11.6–15.1)
GFR SERPL CREATININE-BSD FRML MDRD: 87 ML/MIN/1.73SQ M
GLUCOSE SERPL-MCNC: 88 MG/DL (ref 65–140)
GLUCOSE SERPL-MCNC: 91 MG/DL (ref 65–140)
GLUCOSE SERPL-MCNC: 97 MG/DL (ref 65–140)
HCT VFR BLD AUTO: 34.9 % (ref 34.8–46.1)
HGB BLD-MCNC: 11.8 G/DL (ref 11.5–15.4)
MCH RBC QN AUTO: 32.1 PG (ref 26.8–34.3)
MCHC RBC AUTO-ENTMCNC: 33.8 G/DL (ref 31.4–37.4)
MCV RBC AUTO: 95 FL (ref 82–98)
PLATELET # BLD AUTO: 222 THOUSANDS/UL (ref 149–390)
PMV BLD AUTO: 10.5 FL (ref 8.9–12.7)
POTASSIUM SERPL-SCNC: 3.5 MMOL/L (ref 3.5–5.3)
PROCALCITONIN SERPL-MCNC: 0.19 NG/ML
RBC # BLD AUTO: 3.68 MILLION/UL (ref 3.81–5.12)
SODIUM SERPL-SCNC: 140 MMOL/L (ref 135–147)
WBC # BLD AUTO: 10.74 THOUSAND/UL (ref 4.31–10.16)

## 2025-04-14 PROCEDURE — 84145 PROCALCITONIN (PCT): CPT | Performed by: PHYSICIAN ASSISTANT

## 2025-04-14 PROCEDURE — 80048 BASIC METABOLIC PNL TOTAL CA: CPT | Performed by: PHYSICIAN ASSISTANT

## 2025-04-14 PROCEDURE — 99239 HOSP IP/OBS DSCHRG MGMT >30: CPT | Performed by: PHYSICIAN ASSISTANT

## 2025-04-14 PROCEDURE — NC001 PR NO CHARGE: Performed by: PHYSICIAN ASSISTANT

## 2025-04-14 PROCEDURE — 85027 COMPLETE CBC AUTOMATED: CPT | Performed by: PHYSICIAN ASSISTANT

## 2025-04-14 PROCEDURE — 94640 AIRWAY INHALATION TREATMENT: CPT

## 2025-04-14 PROCEDURE — 82948 REAGENT STRIP/BLOOD GLUCOSE: CPT

## 2025-04-14 PROCEDURE — 94760 N-INVAS EAR/PLS OXIMETRY 1: CPT

## 2025-04-14 RX ORDER — BENZONATATE 100 MG/1
100 CAPSULE ORAL 3 TIMES DAILY PRN
Qty: 20 CAPSULE | Refills: 0 | Status: SHIPPED | OUTPATIENT
Start: 2025-04-14

## 2025-04-14 RX ORDER — BUTALBITAL, ACETAMINOPHEN AND CAFFEINE 50; 325; 40 MG/1; MG/1; MG/1
1 TABLET ORAL EVERY 4 HOURS PRN
Status: DISCONTINUED | OUTPATIENT
Start: 2025-04-14 | End: 2025-04-14 | Stop reason: HOSPADM

## 2025-04-14 RX ORDER — BUTALBITAL, ACETAMINOPHEN AND CAFFEINE 50; 325; 40 MG/1; MG/1; MG/1
1 TABLET ORAL EVERY 6 HOURS PRN
Qty: 20 TABLET | Refills: 0 | Status: SHIPPED | OUTPATIENT
Start: 2025-04-14 | End: 2025-04-24

## 2025-04-14 RX ORDER — PREDNISONE 10 MG/1
TABLET ORAL
Qty: 20 TABLET | Refills: 0 | Status: SHIPPED | OUTPATIENT
Start: 2025-04-14 | End: 2025-04-22

## 2025-04-14 RX ORDER — KETOROLAC TROMETHAMINE 30 MG/ML
15 INJECTION, SOLUTION INTRAMUSCULAR; INTRAVENOUS EVERY 6 HOURS PRN
Status: DISCONTINUED | OUTPATIENT
Start: 2025-04-14 | End: 2025-04-14 | Stop reason: HOSPADM

## 2025-04-14 RX ORDER — ACETAMINOPHEN 325 MG/1
650 TABLET ORAL EVERY 8 HOURS PRN
Status: DISCONTINUED | OUTPATIENT
Start: 2025-04-14 | End: 2025-04-14 | Stop reason: HOSPADM

## 2025-04-14 RX ADMIN — BENZONATATE 100 MG: 100 CAPSULE ORAL at 09:02

## 2025-04-14 RX ADMIN — BUTALBITAL, ACETAMINOPHEN, AND CAFFEINE 1 TABLET: 325; 50; 40 TABLET ORAL at 11:08

## 2025-04-14 RX ADMIN — LEVALBUTEROL HYDROCHLORIDE 1.25 MG: 1.25 SOLUTION RESPIRATORY (INHALATION) at 07:20

## 2025-04-14 RX ADMIN — BUDESONIDE INHALATION 0.5 MG: 0.5 SUSPENSION RESPIRATORY (INHALATION) at 07:20

## 2025-04-14 RX ADMIN — IPRATROPIUM BROMIDE 0.5 MG: 0.5 SOLUTION RESPIRATORY (INHALATION) at 07:20

## 2025-04-14 RX ADMIN — LORATADINE 10 MG: 10 TABLET ORAL at 09:03

## 2025-04-14 RX ADMIN — DOCUSATE SODIUM 100 MG: 100 CAPSULE, LIQUID FILLED ORAL at 09:02

## 2025-04-14 RX ADMIN — PANTOPRAZOLE SODIUM 40 MG: 40 TABLET, DELAYED RELEASE ORAL at 05:23

## 2025-04-14 RX ADMIN — LEVOTHYROXINE SODIUM 88 MCG: 88 TABLET ORAL at 05:23

## 2025-04-14 RX ADMIN — AZITHROMYCIN DIHYDRATE 500 MG: 250 TABLET, FILM COATED ORAL at 11:08

## 2025-04-14 RX ADMIN — ENOXAPARIN SODIUM 40 MG: 40 INJECTION SUBCUTANEOUS at 09:03

## 2025-04-14 RX ADMIN — OSELTAMIVIR PHOSPHATE 75 MG: 75 CAPSULE ORAL at 09:02

## 2025-04-14 RX ADMIN — GUAIFENESIN 600 MG: 600 TABLET ORAL at 09:02

## 2025-04-14 RX ADMIN — METHYLPREDNISOLONE SODIUM SUCCINATE 40 MG: 40 INJECTION, POWDER, FOR SOLUTION INTRAMUSCULAR; INTRAVENOUS at 09:03

## 2025-04-14 RX ADMIN — DEXTROSE 1000 MG: 50 INJECTION, SOLUTION INTRAVENOUS at 02:13

## 2025-04-14 RX ADMIN — SENNOSIDES AND DOCUSATE SODIUM 1 TABLET: 50; 8.6 TABLET ORAL at 09:03

## 2025-04-14 RX ADMIN — IBUPROFEN 800 MG: 400 TABLET, FILM COATED ORAL at 09:13

## 2025-04-14 NOTE — ASSESSMENT & PLAN NOTE
Chest tightness and wheezing for 3 days.    Using nebulizer 4 times daily and albuterol inhaler.  Reports little improvement   Influenza A positive  Will treat with Xopenex and ipratropium 4 times daily, Pulmicort nebulized twice daily  IV Solu-Medrol 40 mg q 12 hours --> tapered to daily dosing  Supportive care with IV fluids and antitussives  Completed three day course of IV Rocephin, Zithromax and tamiflu  Improving procal, WBC stable at 10.74  Trial Fioricet for headache today

## 2025-04-14 NOTE — PLAN OF CARE
Problem: Potential for Falls  Goal: Patient will remain free of falls  Description: INTERVENTIONS:- Educate patient/family on patient safety including physical limitations- Instruct patient to call for assistance with activity - Consult OT/PT to assist with strengthening/mobility - Keep Call bell within reach- Keep bed low and locked with side rails adjusted as appropriate- Keep care items and personal belongings within reach- Initiate and maintain comfort rounds- Make Fall Risk Sign visible to staff-  Apply yellow socks and bracelet for high fall risk patients- Consider moving patient to room near nurses station  4/14/2025 1452 by Colleen Hughes-Behler, RN  Outcome: Adequate for Discharge  4/14/2025 0951 by Colleen Hughes-Behler, RN  Outcome: Progressing     Problem: DISCHARGE PLANNING  Goal: Discharge to home or other facility with appropriate resources  Description: INTERVENTIONS:- Identify barriers to discharge w/patient and caregiver- Arrange for needed discharge resources and transportation as appropriate- Identify discharge learning needs (meds, wound care, etc.)- Arrange for interpretive services to assist at discharge as needed- Refer to Case Management Department for coordinating discharge planning if the patient needs post-hospital services based on physician/advanced practitioner order or complex needs related to functional status, cognitive ability, or social support system  4/14/2025 1452 by Colleen Hughes-Behler, RN  Outcome: Adequate for Discharge  4/14/2025 0951 by Colleen Hughes-Behler, RN  Outcome: Progressing     Problem: Knowledge Deficit  Goal: Patient/family/caregiver demonstrates understanding of disease process, treatment plan, medications, and discharge instructions  Description: Complete learning assessment and assess knowledge base.Interventions:- Provide teaching at level of understanding- Provide teaching via preferred learning methods  4/14/2025 1452 by Colleen Hughes-Behler,  RN  Outcome: Adequate for Discharge  4/14/2025 0951 by Colleen Hughes-Behler, RN  Outcome: Progressing     Problem: RESPIRATORY - ADULT  Goal: Achieves optimal ventilation and oxygenation  Description: INTERVENTIONS:- Assess for changes in respiratory status- Assess for changes in mentation and behavior- Position to facilitate oxygenation and minimize respiratory effort- Oxygen administered by appropriate delivery if ordered- Initiate smoking cessation education as indicated- Encourage broncho-pulmonary hygiene including cough, deep breathe, Incentive Spirometry- Assess the need for suctioning and aspirate as needed- Assess and instruct to report SOB or any respiratory difficulty- Respiratory Therapy support as indicated  4/14/2025 1452 by Colleen Hughes-Behler, RN  Outcome: Adequate for Discharge  4/14/2025 0951 by Colleen Hughes-Behler, RN  Outcome: Progressing

## 2025-04-14 NOTE — PROGRESS NOTES
Progress Note - Hospitalist   Name: Ruth Ann Durán 49 y.o. female I MRN: [id number]  Unit/Bed#: E5 -01 I Date of Admission: 4/11/2025   Date of Service: 4/14/2025 I Hospital Day: 1    Assessment & Plan  Moderate persistent asthma with acute exacerbation  Chest tightness and wheezing for 3 days.    Using nebulizer 4 times daily and albuterol inhaler.  Reports little improvement   Influenza A positive  Will treat with Xopenex and ipratropium 4 times daily, Pulmicort nebulized twice daily  IV Solu-Medrol 40 mg q 12 hours --> tapered to daily dosing  Supportive care with IV fluids and antitussives  Completed three day course of IV Rocephin, Zithromax and tamiflu  Improving procal, WBC stable at 10.74  Trial Fioricet for headache today   Influenza A  Found to be positive for influenza A  Started on Tamiflu-day 3 out of 5  Acquired hypothyroidism  Continue levothyroxine 88 mcg  Hypokalemia  Recent Labs     04/12/25  0520 04/13/25  0546 04/14/25  0528   K 3.7 4.2 3.5   Resolved   Chronic bilateral low back pain without sciatica  Follows with [facility] medicine at [facility].  Takes ibuprofen 800 mg as needed and muscle relaxant  Class 1 obesity  Weight loss, exercise, dietary and lifestyle modifications    VTE Pharmacologic Prophylaxis:   High Risk (Score >/= 5) - Pharmacological DVT Prophylaxis Ordered: enoxaparin (Lovenox). Sequential Compression Devices Ordered.    Mobility:   Basic Mobility Inpatient Raw Score: 24  JH-HLM Goal: 8: Walk 250 feet or more  JH-HLM Achieved: 7: Walk 25 feet or more  JH-HLM Goal NOT achieved. Continue with multidisciplinary rounding and encourage appropriate mobility to improve upon JH-HLM goals.    Patient Centered Rounds: I performed bedside rounds with nursing staff today.   Discussions with Specialists or Other Care Team Provider: CARLITA    Current Length of Stay: 1 day(s)  Current Patient Status: Inpatient   Certification Statement: The patient will continue to require additional inpatient  hospital stay due to asthma exacerbation on IV steroids   Discharge Plan: Anticipate discharge tomorrow to home.    Code Status: Level 1 - Full Code    Subjective   Patient seen and examined at bedside. Notes she is still feeling poorly today. Main complaint is headache and poor appetite. Still with some wheezing but improved since admission.    Objective :  Temp:  [98.1 °F (36.7 °C)-98.3 °F (36.8 °C)] 98.3 °F (36.8 °C)  HR:  [59-73] 59  BP: (114-126)/(55-66) 126/55  Resp:  [17-18] 17  SpO2:  [96 %-100 %] 100 %  O2 Device: None (Room air)    Body mass index is 32.12 kg/m².     Input and Output Summary (last 24 hours):     Intake/Output Summary (Last 24 hours) at 4/14/2025 1210  Last data filed at 4/14/2025 0901  Gross per 24 hour   Intake 440 ml   Output --   Net 440 ml       Physical Exam  Vitals reviewed.   Constitutional:       General: She is not in acute distress.  HENT:      Head: Normocephalic and atraumatic.   Eyes:      General: No scleral icterus.     Conjunctiva/sclera: Conjunctivae normal.   Cardiovascular:      Rate and Rhythm: Normal rate and regular rhythm.      Heart sounds: No murmur heard.  Pulmonary:      Effort: Pulmonary effort is normal. No respiratory distress.      Breath sounds: Normal breath sounds.   Abdominal:      General: Bowel sounds are normal. There is no distension.      Palpations: Abdomen is soft.      Tenderness: There is no abdominal tenderness.   Musculoskeletal:      Cervical back: Neck supple.      Right lower leg: No edema.      Left lower leg: No edema.   Skin:     General: Skin is warm and dry.   Neurological:      Mental Status: She is alert and oriented to person, place, and time.   Psychiatric:         Mood and Affect: Mood normal.         Behavior: Behavior normal.           Lines/Drains:              Lab Results: I have reviewed the following results:   Results from last 7 days   Lab Units 04/14/25  0528 04/13/25  0546 04/12/25  0520   WBC Thousand/uL 10.74*   < >  7.75   HEMOGLOBIN g/dL 11.8   < > 12.3   HEMATOCRIT % 34.9   < > 36.6   PLATELETS Thousands/uL 222   < > 219   SEGS PCT %  --   --  93*   LYMPHO PCT %  --   --  5*   MONO PCT %  --   --  2*   EOS PCT %  --   --  0    < > = values in this interval not displayed.     Results from last 7 days   Lab Units 04/14/25  0528 04/12/25  0520 04/11/25  2137   SODIUM mmol/L 140   < > 135   POTASSIUM mmol/L 3.5   < > 3.2*   CHLORIDE mmol/L 107   < > 100   CO2 mmol/L 26   < > 23   BUN mg/dL 15   < > 9   CREATININE mg/dL 0.80   < > 0.88   ANION GAP mmol/L 7   < > 12   CALCIUM mg/dL 8.4   < > 9.0   ALBUMIN g/dL  --   --  4.1   TOTAL BILIRUBIN mg/dL  --   --  0.43   ALK PHOS U/L  --   --  93   ALT U/L  --   --  34   AST U/L  --   --  32   GLUCOSE RANDOM mg/dL 88   < > 126    < > = values in this interval not displayed.         Results from last 7 days   Lab Units 04/14/25  1115 04/14/25  0738 04/12/25  1554   POC GLUCOSE mg/dl 91 97 127         Results from last 7 days   Lab Units 04/14/25  0528 04/13/25  0546 04/11/25  2137   PROCALCITONIN ng/ml 0.19 0.29* 0.13       Recent Cultures (last 7 days):   Results from last 7 days   Lab Units 04/12/25  1307 04/12/25  1215   SPUTUM CULTURE   --  Test not performed. Suggest repeat specimen.   GRAM STAIN RESULT   --  3+ Epithelial cells per low power field*  2+ Gram positive cocci in clusters*  4+ Gram negative rods*  No polys seen*   LEGIONELLA URINARY ANTIGEN  Negative  --              Last 24 Hours Medication List:     Current Facility-Administered Medications:     acetaminophen (TYLENOL) tablet 650 mg, Q8H PRN    albuterol inhalation solution 2.5 mg, Q4H PRN    aluminum-magnesium hydroxide-simethicone (MAALOX) oral suspension 30 mL, Q6H PRN    azithromycin (ZITHROMAX) tablet 500 mg, Q24H    benzonatate (TESSALON PERLES) capsule 100 mg, TID    budesonide (PULMICORT) inhalation solution 0.5 mg, Q12H    butalbital-acetaminophen-caffeine (FIORICET,ESGIC) -40 mg per tablet 1 tablet,  Q4H PRN    cefTRIAXone (ROCEPHIN) 1,000 mg in dextrose 5 % 50 mL IVPB, Q24H, Last Rate: 1,000 mg (04/14/25 0213)    dextromethorphan-guaiFENesin (ROBITUSSIN DM) oral syrup 10 mL, Q4H PRN    docusate sodium (COLACE) capsule 100 mg, BID    enoxaparin (LOVENOX) subcutaneous injection 40 mg, Daily    guaiFENesin (MUCINEX) 12 hr tablet 600 mg, Q12H MARK    hydrOXYzine HCL (ATARAX) tablet 25 mg, Q6H PRN    ipratropium (ATROVENT) 0.02 % inhalation solution 0.5 mg, TID    ketorolac (TORADOL) injection 15 mg, Q6H PRN    levalbuterol (XOPENEX) inhalation solution 1.25 mg, TID **AND** [DISCONTINUED] sodium chloride 0.9 % inhalation solution 3 mL, 4x Daily    levothyroxine tablet 88 mcg, Early Morning    loratadine (CLARITIN) tablet 10 mg, Daily    methocarbamol (ROBAXIN) tablet 1,000 mg, Q6H PRN    methylPREDNISolone sodium succinate (Solu-MEDROL) injection 40 mg, Daily    ondansetron (ZOFRAN) injection 4 mg, Q6H PRN    oseltamivir (TAMIFLU) capsule 75 mg, Q12H MARK    pantoprazole (PROTONIX) EC tablet 40 mg, Early Morning    senna-docusate sodium (SENOKOT S) 8.6-50 mg per tablet 1 tablet, BID    Administrative Statements   Today, Patient Was Seen By: Eusebia Marsh PA-C      **Please Note: This note may have been constructed using a voice recognition system.**   No

## 2025-04-14 NOTE — DISCHARGE SUMMARY
Discharge Summary - Hospitalist   Name: Ruth Ann Durán 49 y.o. female I MRN: [id number]  Unit/Bed#: E5 -01 I Date of Admission: 4/11/2025   Date of Service: 4/14/2025 I Hospital Day: 1     Assessment & Plan  Moderate persistent asthma with acute exacerbation  Chest tightness and wheezing for 3 days.    Using nebulizer 4 times daily and albuterol inhaler.  Reports little improvement   Influenza A positive  Will treat with Xopenex and ipratropium 4 times daily, Pulmicort nebulized twice daily  IV Solu-Medrol 40 mg q 12 hours --> tapered to daily dosing, placed on prednisone taper   Supportive care with IV fluids and antitussives  Completed three day course of IV Rocephin, Zithromax and tamiflu  Improving procal, WBC stable at 10.74  Trial Fioricet for headache today- improved, will dc today with prescription for a few days of PRN Fioricet   Influenza A  Found to be positive for influenza A  Received Tamiflu while inpatient   Acquired hypothyroidism  Continue levothyroxine 88 mcg  Hypokalemia  Recent Labs     04/12/25  0520 04/13/25  0546 04/14/25  0528   K 3.7 4.2 3.5   Resolved   Chronic bilateral low back pain without sciatica  Follows with pain medicine at [facility].  Takes ibuprofen 800 mg as needed and muscle relaxant  Class 1 obesity  Weight loss, exercise, dietary and lifestyle modifications     Medical Problems       Resolved Problems  Date Reviewed: 4/11/2025          Resolved    Anxiety 4/12/2025     Resolved by  Ian Roland DO        Discharging Physician / Practitioner: Eusebia Marsh PA-C  PCP: Karmen Singer MD  Admission Date:   Admission Orders (From admission, onward)       Ordered        04/13/25 1337  INPATIENT ADMISSION  Once            04/11/25 2326  Place in Observation  Once                          Discharge Date: 04/14/25    Consultations During Hospital Stay:  none    Procedures Performed:   X-ray chest 1 view portable  Result Date: 4/12/2025  Impression: No acute  cardiopulmonary disease      Significant Findings / Test Results:   none    Incidental Findings:   none     Test Results Pending at Discharge (will require follow up):   none     Outpatient Tests Requested:  PCP follow up     Complications:  none    Reason for Admission: asthma exacerbation     Hospital Course:   Ruth Ann Durán is a 49 y.o. female patient who originally presented to the hospital on 4/11/2025 due to asthma exacerbation. She was admitted and started on IV steroids and scheduled nebulizers. She did test positive for influenza and was treated with tamiflu. She was started on a short course of IV Rocephin and PO Zithromax during hospital stay for concern of possible gram negative pneumonia. Procal was negative on day of discharge and she was discharged off abx. She was switched to PO prednisone at discharge. She did complain of intractable headache during her stay and received Fioricet with improvement and was sent home with a PRN prescription for the medication.       Please see above list of diagnoses and related plan for additional information.     Condition at Discharge: stable    Discharge Day Visit / Exam:   See today's progress note for physical exam.       Discharge instructions/Information to patient and family:   See after visit summary for information provided to patient and family.      Provisions for Follow-Up Care:  See after visit summary for information related to follow-up care and any pertinent home health orders.      Mobility at time of Discharge:   Basic Mobility Inpatient Raw Score: 24  JH-HLM Goal: 8: Walk 250 feet or more  JH-HLM Achieved: 7: Walk 25 feet or more  HLM Goal NOT achieved. Continue to encourage mobility in post discharge setting.     Disposition:   Home    Planned Readmission: none    Discharge Medications:  See after visit summary for reconciled discharge medications provided to patient and/or family.      Administrative Statements   Discharge Statement:  I have  spent a total time of 35 minutes in caring for this patient on the day of the visit/encounter. .    **Please Note: This note may have been constructed using a voice recognition system**

## 2025-04-14 NOTE — ASSESSMENT & PLAN NOTE
Follows with pain medicine at [facility].  Takes ibuprofen 800 mg as needed and muscle relaxant

## 2025-04-14 NOTE — UTILIZATION REVIEW
NOTIFICATION OF INPATIENT ADMISSION   AUTHORIZATION REQUEST   SERVICING FACILITY:   [place] [address]  Tax ID: [id number]  NPI: [id number] ATTENDING PROVIDER:  Attending Name and NPI#: Joey López Md [[id number]]  Address: [address] [place] [address]  Phone: [phone]     ADMISSION INFORMATION:  Place of Service: Inpatient [facility]  Place of Service Code: 21  Inpatient Admission Date/Time: 4/13/25  1:37 PM  Discharge Date/Time: No discharge date for patient encounter.  Admitting Diagnosis Code/Description:  Flu [J11.1]  Asthma exacerbation [J45.901]     UTILIZATION REVIEW CONTACT:  Cydney Brizuela, Utilization   Network Utilization Review Department  Phone: [phone]  Fax [phone]  Email: [email or web address]  Contact for approvals/pending authorizations, clinical reviews, and discharge.     PHYSICIAN ADVISORY SERVICES:  Medical Necessity Denial & Mubp-cs-Hgub Review  Phone: [phone]  Fax: [phone]  Email: [email or web address]     DISCHARGE SUPPORT TEAM:  For Patients Discharge Needs & Updates  Phone: [phone] opt. 2 Fax: [phone]  Email: [email or web address]

## 2025-04-14 NOTE — ASSESSMENT & PLAN NOTE
Follows with pain medicine at [facility].  Takes ibuprofen 800 mg as needed and muscle relaxant

## 2025-04-14 NOTE — ASSESSMENT & PLAN NOTE
Chest tightness and wheezing for 3 days.    Using nebulizer 4 times daily and albuterol inhaler.  Reports little improvement   Influenza A positive  Will treat with Xopenex and ipratropium 4 times daily, Pulmicort nebulized twice daily  IV Solu-Medrol 40 mg q 12 hours --> tapered to daily dosing, placed on prednisone taper   Supportive care with IV fluids and antitussives  Completed three day course of IV Rocephin, Zithromax and tamiflu  Improving procal, WBC stable at 10.74  Trial Fioricet for headache today- improved, will dc today with prescription for a few days of PRN Fioricet

## 2025-04-15 NOTE — UTILIZATION REVIEW
NOTIFICATION OF ADMISSION DISCHARGE   This is a Notification of Discharge from [facility]. Please be advised that this patient has been discharge from our facility. Below you will find the admission and discharge date and time including the patient’s disposition.   UTILIZATION REVIEW CONTACT:  Utilization Review Assistants  Network Utilization Review Department  Phone: [phone] x carefully listen to the prompts. All voicemails are confidential.  Email: [email or web address]     ADMISSION INFORMATION  PRESENTATION DATE: 4/11/2025  9:26 PM  OBERVATION ADMISSION DATE: 04/11/2025 2327  INPATIENT ADMISSION DATE: 4/13/25  1:37 PM   DISCHARGE DATE: 4/14/2025  3:38 PM   DISPOSITION:Home/Self Care    Network Utilization Review Department  ATTENTION: Please call with any questions or concerns to [phone] and carefully listen to the prompts so that you are directed to the right person. All voicemails are confidential.   For Discharge needs, contact Care Management DC Support Team at [phone] opt. 2  Send all requests for admission clinical reviews, approved or denied determinations and any other requests to dedicated fax number below belonging to the campus where the patient is receiving treatment. List of dedicated fax numbers for the Facilities:  FACILITY NAME UR FAX NUMBER   ADMISSION DENIALS (Administrative/Medical Necessity) [phone]   DISCHARGE SUPPORT TEAM ([facility]) [phone]   PARENT CHILD HEALTH (Maternity/NICU/Pediatrics) [phone]   [facility] [phone]   [facility] [phone]   [facility] [phone]   [facility] [phone]   [facility] [phone]   [facility] [phone]   [facility] [phone]   [facility] [phone]    [facility] [phone]   [facility] [phone]   General acute hospital [phone]   [facility] ([place], [place]) [phone]

## (undated) DEVICE — 3000CC GUARDIAN II: Brand: GUARDIAN

## (undated) DEVICE — OCCLUDER COLPO-PNEUMO

## (undated) DEVICE — GLOVE SRG BIOGEL ORTHOPEDIC 8

## (undated) DEVICE — UTERINE MANIPULATOR RUMI 6.7 X 8 CM

## (undated) DEVICE — ENDOPATH 5MM ENDOSCOPIC BLUNT TIP DISSECTORS (12 POUCHES CONTAINING 3 DISSECTORS EACH): Brand: ENDOPATH

## (undated) DEVICE — Device

## (undated) DEVICE — TUBING SUCTION 5MM X 12 FT

## (undated) DEVICE — LAP AEM SCISSOR TIP .75 IN

## (undated) DEVICE — BLUE HEAT SCOPE WARMER

## (undated) DEVICE — UNDYED BRAIDED (POLYGLACTIN 910), SYNTHETIC ABSORBABLE SUTURE: Brand: COATED VICRYL

## (undated) DEVICE — ENDOPATH XCEL BLADELESS TROCARS WITH STABILITY SLEEVES: Brand: ENDOPATH XCEL

## (undated) DEVICE — POOLE SUCTION HANDLE: Brand: CARDINAL HEALTH

## (undated) DEVICE — ENDOPATH PNEUMONEEDLE INSUFFLATION NEEDLES WITH LUER LOCK CONNECTORS 120MM: Brand: ENDOPATH

## (undated) DEVICE — LAPAROSCOPIC SMOKE EVAC TUBING

## (undated) DEVICE — UNIVERSAL GYN LAPAROSCOPY,KIT: Brand: CARDINAL HEALTH

## (undated) DEVICE — PACKING VAGINAL 2 IN

## (undated) DEVICE — ADHESIVE SKN CLSR HISTOACRYL FLEX 0.5ML LF

## (undated) DEVICE — CHLORAPREP HI-LITE 26ML ORANGE

## (undated) DEVICE — AEM CORD

## (undated) DEVICE — INTENDED FOR TISSUE SEPARATION, AND OTHER PROCEDURES THAT REQUIRE A SHARP SURGICAL BLADE TO PUNCTURE OR CUT.: Brand: BARD-PARKER SAFETY BLADES SIZE 15, STERILE

## (undated) DEVICE — PREMIUM DRY TRAY LF: Brand: MEDLINE INDUSTRIES, INC.

## (undated) DEVICE — SYRINGE 50ML LL

## (undated) DEVICE — [HIGH FLOW INSUFFLATOR,  DO NOT USE IF PACKAGE IS DAMAGED,  KEEP DRY,  KEEP AWAY FROM SUNLIGHT,  PROTECT FROM HEAT AND RADIOACTIVE SOURCES.]: Brand: PNEUMOSURE

## (undated) DEVICE — ENDOPATH XCEL UNIVERSAL TROCAR STABLILITY SLEEVES: Brand: ENDOPATH XCEL

## (undated) DEVICE — IRRIG ENDO FLO TUBING

## (undated) DEVICE — BLUNT TIP LAPAROSCOPIC SEALER/DIVIDER: Brand: LIGASURE

## (undated) DEVICE — SCD SEQUENTIAL COMPRESSION COMFORT SLEEVE MEDIUM KNEE LENGTH: Brand: KENDALL SCD

## (undated) DEVICE — MAYO STAND COVER: Brand: CONVERTORS

## (undated) DEVICE — SUT VICRYL 4-0 PS-2 27 IN J426H